# Patient Record
Sex: MALE | Race: BLACK OR AFRICAN AMERICAN | NOT HISPANIC OR LATINO | URBAN - METROPOLITAN AREA
[De-identification: names, ages, dates, MRNs, and addresses within clinical notes are randomized per-mention and may not be internally consistent; named-entity substitution may affect disease eponyms.]

---

## 2017-01-05 ENCOUNTER — OUTPATIENT (OUTPATIENT)
Dept: OUTPATIENT SERVICES | Facility: HOSPITAL | Age: 62
LOS: 1 days | End: 2017-01-05
Payer: MEDICAID

## 2017-01-05 VITALS
HEIGHT: 70 IN | TEMPERATURE: 98 F | RESPIRATION RATE: 16 BRPM | WEIGHT: 227.96 LBS | SYSTOLIC BLOOD PRESSURE: 122 MMHG | DIASTOLIC BLOOD PRESSURE: 78 MMHG | HEART RATE: 76 BPM

## 2017-01-05 DIAGNOSIS — G47.33 OBSTRUCTIVE SLEEP APNEA (ADULT) (PEDIATRIC): ICD-10-CM

## 2017-01-05 DIAGNOSIS — Z91.013 ALLERGY TO SEAFOOD: ICD-10-CM

## 2017-01-05 DIAGNOSIS — M51.26 OTHER INTERVERTEBRAL DISC DISPLACEMENT, LUMBAR REGION: ICD-10-CM

## 2017-01-05 DIAGNOSIS — M48.07 SPINAL STENOSIS, LUMBOSACRAL REGION: ICD-10-CM

## 2017-01-05 DIAGNOSIS — Z98.89 OTHER SPECIFIED POSTPROCEDURAL STATES: Chronic | ICD-10-CM

## 2017-01-05 LAB
BLD GP AB SCN SERPL QL: NEGATIVE — SIGNIFICANT CHANGE UP
BUN SERPL-MCNC: 10 MG/DL — SIGNIFICANT CHANGE UP (ref 7–23)
CALCIUM SERPL-MCNC: 9.6 MG/DL — SIGNIFICANT CHANGE UP (ref 8.4–10.5)
CHLORIDE SERPL-SCNC: 100 MMOL/L — SIGNIFICANT CHANGE UP (ref 98–107)
CO2 SERPL-SCNC: 27 MMOL/L — SIGNIFICANT CHANGE UP (ref 22–31)
CREAT SERPL-MCNC: 1.07 MG/DL — SIGNIFICANT CHANGE UP (ref 0.5–1.3)
GLUCOSE SERPL-MCNC: 98 MG/DL — SIGNIFICANT CHANGE UP (ref 70–99)
HCT VFR BLD CALC: 44.6 % — SIGNIFICANT CHANGE UP (ref 39–50)
HGB BLD-MCNC: 14.2 G/DL — SIGNIFICANT CHANGE UP (ref 13–17)
MCHC RBC-ENTMCNC: 26 PG — LOW (ref 27–34)
MCHC RBC-ENTMCNC: 31.8 % — LOW (ref 32–36)
MCV RBC AUTO: 81.5 FL — SIGNIFICANT CHANGE UP (ref 80–100)
PLATELET # BLD AUTO: 254 K/UL — SIGNIFICANT CHANGE UP (ref 150–400)
PMV BLD: 11 FL — SIGNIFICANT CHANGE UP (ref 7–13)
POTASSIUM SERPL-MCNC: 4.2 MMOL/L — SIGNIFICANT CHANGE UP (ref 3.5–5.3)
POTASSIUM SERPL-SCNC: 4.2 MMOL/L — SIGNIFICANT CHANGE UP (ref 3.5–5.3)
RBC # BLD: 5.47 M/UL — SIGNIFICANT CHANGE UP (ref 4.2–5.8)
RBC # FLD: 13.6 % — SIGNIFICANT CHANGE UP (ref 10.3–14.5)
RH IG SCN BLD-IMP: POSITIVE — SIGNIFICANT CHANGE UP
SODIUM SERPL-SCNC: 142 MMOL/L — SIGNIFICANT CHANGE UP (ref 135–145)
WBC # BLD: 6.03 K/UL — SIGNIFICANT CHANGE UP (ref 3.8–10.5)
WBC # FLD AUTO: 6.03 K/UL — SIGNIFICANT CHANGE UP (ref 3.8–10.5)

## 2017-01-05 PROCEDURE — 93010 ELECTROCARDIOGRAM REPORT: CPT

## 2017-01-05 RX ORDER — SODIUM CHLORIDE 9 MG/ML
1000 INJECTION, SOLUTION INTRAVENOUS
Qty: 0 | Refills: 0 | Status: DISCONTINUED | OUTPATIENT
Start: 2017-01-17 | End: 2017-01-17

## 2017-01-05 RX ORDER — SODIUM CHLORIDE 9 MG/ML
3 INJECTION INTRAMUSCULAR; INTRAVENOUS; SUBCUTANEOUS EVERY 8 HOURS
Qty: 0 | Refills: 0 | Status: DISCONTINUED | OUTPATIENT
Start: 2017-01-17 | End: 2017-01-20

## 2017-01-05 NOTE — H&P PST ADULT - NEGATIVE CARDIOVASCULAR SYMPTOMS
no paroxysmal nocturnal dyspnea/no dyspnea on exertion/no chest pain/no palpitations/no orthopnea/no claudication

## 2017-01-05 NOTE — H&P PST ADULT - NEUROLOGICAL DETAILS
responds to verbal commands/alert and oriented x 3/responds to pain/sensation intact/cranial nerves intact

## 2017-01-05 NOTE — H&P PST ADULT - PROBLEM SELECTOR PLAN 1
Scheduled  for L4-S1 laminectomy and discectomy on 01/17/17. Pre op instructions, famotidine, chlorhexidine gluconate soap given and explained. Pt verbalized understanding.

## 2017-01-05 NOTE — H&P PST ADULT - HISTORY OF PRESENT ILLNESS
62 y/o Black male with PMH: HTN, HLD, Obesity presents to PST for  pre op evaluation with hx of chronic lower back pain. Pt stated that pain has been worsening for the past 4-5 months. S/P MRI 10/19/2016. Now scheduled for L4-S1 lumbar laminectomy and fusion on 01/17/17

## 2017-01-05 NOTE — H&P PST ADULT - NEGATIVE OPHTHALMOLOGIC SYMPTOMS
no irritation R/no pain L/no discharge L/no pain R/no scleral injection R/no irritation L/no photophobia/no blurred vision L/no lacrimation L/no scleral injection L/no diplopia/no loss of vision R/no loss of vision L/no blurred vision R/no discharge R/no lacrimation R

## 2017-01-05 NOTE — H&P PST ADULT - NEGATIVE SKIN SYMPTOMS
no rash/no pitted nails/no itching/no dryness/no brittle nails/no tumor/no change in size/color of mole

## 2017-01-05 NOTE — H&P PST ADULT - FAMILY HISTORY
Father  Still living? Yes, Estimated age: 81-90  Family history of Alzheimer's disease, Age at diagnosis: Age Unknown     Mother  Still living? Yes, Estimated age: 81-90  Family history of hypertension in mother, Age at diagnosis: Age Unknown

## 2017-01-05 NOTE — H&P PST ADULT - NEGATIVE BREAST SYMPTOMS
no nipple discharge L/no breast lump L/no breast tenderness R/no breast tenderness L/no nipple discharge R/no breast lump R

## 2017-01-05 NOTE — H&P PST ADULT - PMH
Back pain  chronic  HTN (hypertension)    Obesity (BMI 30.0-34.9)    CHRISSIE on CPAP    Other intervertebral disc displacement, lumbar region    Spinal stenosis, lumbosacral region

## 2017-01-05 NOTE — H&P PST ADULT - PRO PAIN LIFE ADAPT
inability to enjoy life/inability to sleep/decreased activity level/inability to work/inability to concentrate/withdrawal from social contact

## 2017-01-13 ENCOUNTER — APPOINTMENT (OUTPATIENT)
Dept: ORTHOPEDIC SURGERY | Facility: CLINIC | Age: 62
End: 2017-01-13

## 2017-01-13 VITALS — HEIGHT: 71 IN | WEIGHT: 234 LBS | BODY MASS INDEX: 32.76 KG/M2

## 2017-01-13 RX ORDER — PSYLLIUM HUSK 3.4G/5.8G
58.6 POWDER (GRAM) ORAL
Qty: 283 | Refills: 0 | Status: ACTIVE | COMMUNITY
Start: 2016-12-27

## 2017-01-13 RX ORDER — FENOFIBRATE 134 MG/1
134 CAPSULE ORAL
Qty: 30 | Refills: 0 | Status: ACTIVE | COMMUNITY
Start: 2016-05-09

## 2017-01-13 RX ORDER — GABAPENTIN 600 MG/1
600 TABLET, COATED ORAL
Qty: 90 | Refills: 0 | Status: ACTIVE | COMMUNITY
Start: 2016-05-09

## 2017-01-16 ENCOUNTER — RESULT REVIEW (OUTPATIENT)
Age: 62
End: 2017-01-16

## 2017-01-17 ENCOUNTER — INPATIENT (INPATIENT)
Facility: HOSPITAL | Age: 62
LOS: 2 days | Discharge: NURSING, REHAB OR LONG-TERM CARE | End: 2017-01-20
Attending: STUDENT IN AN ORGANIZED HEALTH CARE EDUCATION/TRAINING PROGRAM | Admitting: STUDENT IN AN ORGANIZED HEALTH CARE EDUCATION/TRAINING PROGRAM
Payer: MEDICAID

## 2017-01-17 ENCOUNTER — APPOINTMENT (OUTPATIENT)
Dept: ORTHOPEDIC SURGERY | Facility: HOSPITAL | Age: 62
End: 2017-01-17

## 2017-01-17 VITALS
RESPIRATION RATE: 16 BRPM | SYSTOLIC BLOOD PRESSURE: 137 MMHG | WEIGHT: 227.96 LBS | TEMPERATURE: 98 F | HEIGHT: 70 IN | HEART RATE: 75 BPM | OXYGEN SATURATION: 99 % | DIASTOLIC BLOOD PRESSURE: 88 MMHG

## 2017-01-17 DIAGNOSIS — Z98.89 OTHER SPECIFIED POSTPROCEDURAL STATES: Chronic | ICD-10-CM

## 2017-01-17 DIAGNOSIS — M51.26 OTHER INTERVERTEBRAL DISC DISPLACEMENT, LUMBAR REGION: ICD-10-CM

## 2017-01-17 LAB
BASOPHILS # BLD AUTO: 0.02 K/UL — SIGNIFICANT CHANGE UP (ref 0–0.2)
BASOPHILS NFR BLD AUTO: 0.3 % — SIGNIFICANT CHANGE UP (ref 0–2)
BUN SERPL-MCNC: 12 MG/DL — SIGNIFICANT CHANGE UP (ref 7–23)
CALCIUM SERPL-MCNC: 8.5 MG/DL — SIGNIFICANT CHANGE UP (ref 8.4–10.5)
CHLORIDE SERPL-SCNC: 101 MMOL/L — SIGNIFICANT CHANGE UP (ref 98–107)
CO2 SERPL-SCNC: 24 MMOL/L — SIGNIFICANT CHANGE UP (ref 22–31)
CREAT SERPL-MCNC: 1.27 MG/DL — SIGNIFICANT CHANGE UP (ref 0.5–1.3)
EOSINOPHIL # BLD AUTO: 0.11 K/UL — SIGNIFICANT CHANGE UP (ref 0–0.5)
EOSINOPHIL NFR BLD AUTO: 1.6 % — SIGNIFICANT CHANGE UP (ref 0–6)
GLUCOSE SERPL-MCNC: 139 MG/DL — HIGH (ref 70–99)
HCT VFR BLD CALC: 41 % — SIGNIFICANT CHANGE UP (ref 39–50)
HGB BLD-MCNC: 13.3 G/DL — SIGNIFICANT CHANGE UP (ref 13–17)
IMM GRANULOCYTES NFR BLD AUTO: 0.6 % — SIGNIFICANT CHANGE UP (ref 0–1.5)
LYMPHOCYTES # BLD AUTO: 1.45 K/UL — SIGNIFICANT CHANGE UP (ref 1–3.3)
LYMPHOCYTES # BLD AUTO: 21.6 % — SIGNIFICANT CHANGE UP (ref 13–44)
MCHC RBC-ENTMCNC: 26.5 PG — LOW (ref 27–34)
MCHC RBC-ENTMCNC: 32.4 % — SIGNIFICANT CHANGE UP (ref 32–36)
MCV RBC AUTO: 81.8 FL — SIGNIFICANT CHANGE UP (ref 80–100)
MONOCYTES # BLD AUTO: 0.23 K/UL — SIGNIFICANT CHANGE UP (ref 0–0.9)
MONOCYTES NFR BLD AUTO: 3.4 % — SIGNIFICANT CHANGE UP (ref 2–14)
NEUTROPHILS # BLD AUTO: 4.86 K/UL — SIGNIFICANT CHANGE UP (ref 1.8–7.4)
NEUTROPHILS NFR BLD AUTO: 72.5 % — SIGNIFICANT CHANGE UP (ref 43–77)
PLATELET # BLD AUTO: 217 K/UL — SIGNIFICANT CHANGE UP (ref 150–400)
PMV BLD: 10.3 FL — SIGNIFICANT CHANGE UP (ref 7–13)
POTASSIUM SERPL-MCNC: 4.3 MMOL/L — SIGNIFICANT CHANGE UP (ref 3.5–5.3)
POTASSIUM SERPL-SCNC: 4.3 MMOL/L — SIGNIFICANT CHANGE UP (ref 3.5–5.3)
RBC # BLD: 5.01 M/UL — SIGNIFICANT CHANGE UP (ref 4.2–5.8)
RBC # FLD: 14.2 % — SIGNIFICANT CHANGE UP (ref 10.3–14.5)
RH IG SCN BLD-IMP: POSITIVE — SIGNIFICANT CHANGE UP
SODIUM SERPL-SCNC: 140 MMOL/L — SIGNIFICANT CHANGE UP (ref 135–145)
WBC # BLD: 6.71 K/UL — SIGNIFICANT CHANGE UP (ref 3.8–10.5)
WBC # FLD AUTO: 6.71 K/UL — SIGNIFICANT CHANGE UP (ref 3.8–10.5)

## 2017-01-17 PROCEDURE — 72100 X-RAY EXAM L-S SPINE 2/3 VWS: CPT | Mod: 26

## 2017-01-17 PROCEDURE — 63012 REMOVE LAMINA/FACETS LUMBAR: CPT | Mod: 59

## 2017-01-17 PROCEDURE — 63047 LAM FACETEC & FORAMOT LUMBAR: CPT

## 2017-01-17 PROCEDURE — 63012 REMOVE LAMINA/FACETS LUMBAR: CPT | Mod: 82,59

## 2017-01-17 PROCEDURE — 63047 LAM FACETEC & FORAMOT LUMBAR: CPT | Mod: 82

## 2017-01-17 PROCEDURE — 88304 TISSUE EXAM BY PATHOLOGIST: CPT | Mod: 26

## 2017-01-17 PROCEDURE — 63030 LAMOT DCMPRN NRV RT 1 LMBR: CPT | Mod: 82,59

## 2017-01-17 PROCEDURE — 63030 LAMOT DCMPRN NRV RT 1 LMBR: CPT | Mod: 59

## 2017-01-17 RX ORDER — SENNA PLUS 8.6 MG/1
2 TABLET ORAL AT BEDTIME
Qty: 0 | Refills: 0 | Status: DISCONTINUED | OUTPATIENT
Start: 2017-01-17 | End: 2017-01-20

## 2017-01-17 RX ORDER — ACETAMINOPHEN 500 MG
650 TABLET ORAL EVERY 6 HOURS
Qty: 0 | Refills: 0 | Status: DISCONTINUED | OUTPATIENT
Start: 2017-01-17 | End: 2017-01-20

## 2017-01-17 RX ORDER — OXYCODONE HYDROCHLORIDE 5 MG/1
5 TABLET ORAL EVERY 4 HOURS
Qty: 0 | Refills: 0 | Status: DISCONTINUED | OUTPATIENT
Start: 2017-01-17 | End: 2017-01-20

## 2017-01-17 RX ORDER — ONDANSETRON 8 MG/1
4 TABLET, FILM COATED ORAL ONCE
Qty: 0 | Refills: 0 | Status: DISCONTINUED | OUTPATIENT
Start: 2017-01-17 | End: 2017-01-17

## 2017-01-17 RX ORDER — MAGNESIUM HYDROXIDE 400 MG/1
30 TABLET, CHEWABLE ORAL EVERY 12 HOURS
Qty: 0 | Refills: 0 | Status: DISCONTINUED | OUTPATIENT
Start: 2017-01-17 | End: 2017-01-20

## 2017-01-17 RX ORDER — GABAPENTIN 400 MG/1
300 CAPSULE ORAL
Qty: 0 | Refills: 0 | Status: DISCONTINUED | OUTPATIENT
Start: 2017-01-17 | End: 2017-01-20

## 2017-01-17 RX ORDER — ATENOLOL 25 MG/1
100 TABLET ORAL DAILY
Qty: 0 | Refills: 0 | Status: DISCONTINUED | OUTPATIENT
Start: 2017-01-17 | End: 2017-01-20

## 2017-01-17 RX ORDER — OXYCODONE HYDROCHLORIDE 5 MG/1
5 TABLET ORAL EVERY 4 HOURS
Qty: 0 | Refills: 0 | Status: DISCONTINUED | OUTPATIENT
Start: 2017-01-17 | End: 2017-01-17

## 2017-01-17 RX ORDER — AMLODIPINE BESYLATE 2.5 MG/1
10 TABLET ORAL DAILY
Qty: 0 | Refills: 0 | Status: DISCONTINUED | OUTPATIENT
Start: 2017-01-17 | End: 2017-01-20

## 2017-01-17 RX ORDER — TRAMADOL HYDROCHLORIDE 50 MG/1
100 TABLET ORAL
Qty: 0 | Refills: 0 | Status: DISCONTINUED | OUTPATIENT
Start: 2017-01-17 | End: 2017-01-20

## 2017-01-17 RX ORDER — ONDANSETRON 8 MG/1
4 TABLET, FILM COATED ORAL EVERY 6 HOURS
Qty: 0 | Refills: 0 | Status: DISCONTINUED | OUTPATIENT
Start: 2017-01-17 | End: 2017-01-20

## 2017-01-17 RX ORDER — TRIAMTERENE/HYDROCHLOROTHIAZID 75 MG-50MG
1 TABLET ORAL DAILY
Qty: 0 | Refills: 0 | Status: DISCONTINUED | OUTPATIENT
Start: 2017-01-17 | End: 2017-01-20

## 2017-01-17 RX ORDER — OXYCODONE HYDROCHLORIDE 5 MG/1
10 TABLET ORAL EVERY 4 HOURS
Qty: 0 | Refills: 0 | Status: DISCONTINUED | OUTPATIENT
Start: 2017-01-17 | End: 2017-01-20

## 2017-01-17 RX ORDER — HYDROMORPHONE HYDROCHLORIDE 2 MG/ML
0.5 INJECTION INTRAMUSCULAR; INTRAVENOUS; SUBCUTANEOUS
Qty: 0 | Refills: 0 | Status: DISCONTINUED | OUTPATIENT
Start: 2017-01-17 | End: 2017-01-17

## 2017-01-17 RX ORDER — DOCUSATE SODIUM 100 MG
100 CAPSULE ORAL THREE TIMES A DAY
Qty: 0 | Refills: 0 | Status: DISCONTINUED | OUTPATIENT
Start: 2017-01-17 | End: 2017-01-20

## 2017-01-17 RX ORDER — SODIUM CHLORIDE 9 MG/ML
1000 INJECTION, SOLUTION INTRAVENOUS
Qty: 0 | Refills: 0 | Status: DISCONTINUED | OUTPATIENT
Start: 2017-01-17 | End: 2017-01-20

## 2017-01-17 RX ORDER — CEFAZOLIN SODIUM 1 G
2000 VIAL (EA) INJECTION EVERY 8 HOURS
Qty: 0 | Refills: 0 | Status: COMPLETED | OUTPATIENT
Start: 2017-01-17 | End: 2017-01-18

## 2017-01-17 RX ORDER — MORPHINE SULFATE 50 MG/1
4 CAPSULE, EXTENDED RELEASE ORAL EVERY 4 HOURS
Qty: 0 | Refills: 0 | Status: DISCONTINUED | OUTPATIENT
Start: 2017-01-17 | End: 2017-01-20

## 2017-01-17 RX ADMIN — SODIUM CHLORIDE 3 MILLILITER(S): 9 INJECTION INTRAMUSCULAR; INTRAVENOUS; SUBCUTANEOUS at 14:01

## 2017-01-17 RX ADMIN — HYDROMORPHONE HYDROCHLORIDE 0.5 MILLIGRAM(S): 2 INJECTION INTRAMUSCULAR; INTRAVENOUS; SUBCUTANEOUS at 13:55

## 2017-01-17 RX ADMIN — HYDROMORPHONE HYDROCHLORIDE 0.5 MILLIGRAM(S): 2 INJECTION INTRAMUSCULAR; INTRAVENOUS; SUBCUTANEOUS at 13:45

## 2017-01-17 RX ADMIN — SODIUM CHLORIDE 3 MILLILITER(S): 9 INJECTION INTRAMUSCULAR; INTRAVENOUS; SUBCUTANEOUS at 21:07

## 2017-01-17 RX ADMIN — SODIUM CHLORIDE 75 MILLILITER(S): 9 INJECTION, SOLUTION INTRAVENOUS at 17:21

## 2017-01-17 RX ADMIN — Medication 100 MILLIGRAM(S): at 17:20

## 2017-01-17 RX ADMIN — Medication 100 MILLIGRAM(S): at 21:12

## 2017-01-17 RX ADMIN — HYDROMORPHONE HYDROCHLORIDE 0.5 MILLIGRAM(S): 2 INJECTION INTRAMUSCULAR; INTRAVENOUS; SUBCUTANEOUS at 14:10

## 2017-01-17 RX ADMIN — TRAMADOL HYDROCHLORIDE 100 MILLIGRAM(S): 50 TABLET ORAL at 17:20

## 2017-01-17 RX ADMIN — MORPHINE SULFATE 4 MILLIGRAM(S): 50 CAPSULE, EXTENDED RELEASE ORAL at 20:03

## 2017-01-17 RX ADMIN — GABAPENTIN 300 MILLIGRAM(S): 400 CAPSULE ORAL at 17:20

## 2017-01-17 RX ADMIN — HYDROMORPHONE HYDROCHLORIDE 0.5 MILLIGRAM(S): 2 INJECTION INTRAMUSCULAR; INTRAVENOUS; SUBCUTANEOUS at 13:30

## 2017-01-17 RX ADMIN — OXYCODONE HYDROCHLORIDE 10 MILLIGRAM(S): 5 TABLET ORAL at 17:58

## 2017-01-17 RX ADMIN — OXYCODONE HYDROCHLORIDE 10 MILLIGRAM(S): 5 TABLET ORAL at 16:50

## 2017-01-17 RX ADMIN — MORPHINE SULFATE 4 MILLIGRAM(S): 50 CAPSULE, EXTENDED RELEASE ORAL at 20:30

## 2017-01-17 RX ADMIN — OXYCODONE HYDROCHLORIDE 10 MILLIGRAM(S): 5 TABLET ORAL at 23:58

## 2017-01-17 RX ADMIN — SENNA PLUS 2 TABLET(S): 8.6 TABLET ORAL at 21:12

## 2017-01-17 RX ADMIN — HYDROMORPHONE HYDROCHLORIDE 0.5 MILLIGRAM(S): 2 INJECTION INTRAMUSCULAR; INTRAVENOUS; SUBCUTANEOUS at 13:42

## 2017-01-17 NOTE — BRIEF OPERATIVE NOTE - OPERATION/FINDINGS
See dictated report. L4-5, L5-S1 disc herniations. L side L5 pars defect.  L4-S1 lami, L4-5, L5-S1 discectomy

## 2017-01-18 LAB
BASOPHILS # BLD AUTO: 0.01 K/UL — SIGNIFICANT CHANGE UP (ref 0–0.2)
BASOPHILS NFR BLD AUTO: 0.1 % — SIGNIFICANT CHANGE UP (ref 0–2)
BUN SERPL-MCNC: 13 MG/DL — SIGNIFICANT CHANGE UP (ref 7–23)
CALCIUM SERPL-MCNC: 8.9 MG/DL — SIGNIFICANT CHANGE UP (ref 8.4–10.5)
CHLORIDE SERPL-SCNC: 98 MMOL/L — SIGNIFICANT CHANGE UP (ref 98–107)
CO2 SERPL-SCNC: 24 MMOL/L — SIGNIFICANT CHANGE UP (ref 22–31)
CREAT SERPL-MCNC: 1.12 MG/DL — SIGNIFICANT CHANGE UP (ref 0.5–1.3)
EOSINOPHIL # BLD AUTO: 0 K/UL — SIGNIFICANT CHANGE UP (ref 0–0.5)
EOSINOPHIL NFR BLD AUTO: 0 % — SIGNIFICANT CHANGE UP (ref 0–6)
GLUCOSE SERPL-MCNC: 139 MG/DL — HIGH (ref 70–99)
HCT VFR BLD CALC: 39.5 % — SIGNIFICANT CHANGE UP (ref 39–50)
HGB BLD-MCNC: 12.9 G/DL — LOW (ref 13–17)
IMM GRANULOCYTES NFR BLD AUTO: 0.3 % — SIGNIFICANT CHANGE UP (ref 0–1.5)
LYMPHOCYTES # BLD AUTO: 1.18 K/UL — SIGNIFICANT CHANGE UP (ref 1–3.3)
LYMPHOCYTES # BLD AUTO: 7.9 % — LOW (ref 13–44)
MCHC RBC-ENTMCNC: 26.7 PG — LOW (ref 27–34)
MCHC RBC-ENTMCNC: 32.7 % — SIGNIFICANT CHANGE UP (ref 32–36)
MCV RBC AUTO: 81.8 FL — SIGNIFICANT CHANGE UP (ref 80–100)
MONOCYTES # BLD AUTO: 1.13 K/UL — HIGH (ref 0–0.9)
MONOCYTES NFR BLD AUTO: 7.6 % — SIGNIFICANT CHANGE UP (ref 2–14)
NEUTROPHILS # BLD AUTO: 12.5 K/UL — HIGH (ref 1.8–7.4)
NEUTROPHILS NFR BLD AUTO: 84.1 % — HIGH (ref 43–77)
PLATELET # BLD AUTO: 231 K/UL — SIGNIFICANT CHANGE UP (ref 150–400)
PMV BLD: 10.7 FL — SIGNIFICANT CHANGE UP (ref 7–13)
POTASSIUM SERPL-MCNC: 3.9 MMOL/L — SIGNIFICANT CHANGE UP (ref 3.5–5.3)
POTASSIUM SERPL-SCNC: 3.9 MMOL/L — SIGNIFICANT CHANGE UP (ref 3.5–5.3)
RBC # BLD: 4.83 M/UL — SIGNIFICANT CHANGE UP (ref 4.2–5.8)
RBC # FLD: 14.4 % — SIGNIFICANT CHANGE UP (ref 10.3–14.5)
SODIUM SERPL-SCNC: 138 MMOL/L — SIGNIFICANT CHANGE UP (ref 135–145)
WBC # BLD: 14.86 K/UL — HIGH (ref 3.8–10.5)
WBC # FLD AUTO: 14.86 K/UL — HIGH (ref 3.8–10.5)

## 2017-01-18 RX ORDER — BENZOCAINE AND MENTHOL 5; 1 G/100ML; G/100ML
1 LIQUID ORAL
Qty: 0 | Refills: 0 | Status: DISCONTINUED | OUTPATIENT
Start: 2017-01-18 | End: 2017-01-20

## 2017-01-18 RX ADMIN — Medication 100 MILLIGRAM(S): at 02:16

## 2017-01-18 RX ADMIN — TRAMADOL HYDROCHLORIDE 100 MILLIGRAM(S): 50 TABLET ORAL at 05:20

## 2017-01-18 RX ADMIN — SENNA PLUS 2 TABLET(S): 8.6 TABLET ORAL at 21:34

## 2017-01-18 RX ADMIN — SODIUM CHLORIDE 75 MILLILITER(S): 9 INJECTION, SOLUTION INTRAVENOUS at 06:53

## 2017-01-18 RX ADMIN — SODIUM CHLORIDE 3 MILLILITER(S): 9 INJECTION INTRAMUSCULAR; INTRAVENOUS; SUBCUTANEOUS at 13:50

## 2017-01-18 RX ADMIN — GABAPENTIN 300 MILLIGRAM(S): 400 CAPSULE ORAL at 17:42

## 2017-01-18 RX ADMIN — Medication 100 MILLIGRAM(S): at 14:29

## 2017-01-18 RX ADMIN — TRAMADOL HYDROCHLORIDE 100 MILLIGRAM(S): 50 TABLET ORAL at 17:41

## 2017-01-18 RX ADMIN — MORPHINE SULFATE 4 MILLIGRAM(S): 50 CAPSULE, EXTENDED RELEASE ORAL at 23:09

## 2017-01-18 RX ADMIN — MORPHINE SULFATE 4 MILLIGRAM(S): 50 CAPSULE, EXTENDED RELEASE ORAL at 22:44

## 2017-01-18 RX ADMIN — OXYCODONE HYDROCHLORIDE 10 MILLIGRAM(S): 5 TABLET ORAL at 14:55

## 2017-01-18 RX ADMIN — OXYCODONE HYDROCHLORIDE 10 MILLIGRAM(S): 5 TABLET ORAL at 06:10

## 2017-01-18 RX ADMIN — MAGNESIUM HYDROXIDE 30 MILLILITER(S): 400 TABLET, CHEWABLE ORAL at 14:29

## 2017-01-18 RX ADMIN — OXYCODONE HYDROCHLORIDE 10 MILLIGRAM(S): 5 TABLET ORAL at 22:26

## 2017-01-18 RX ADMIN — OXYCODONE HYDROCHLORIDE 10 MILLIGRAM(S): 5 TABLET ORAL at 21:26

## 2017-01-18 RX ADMIN — SODIUM CHLORIDE 3 MILLILITER(S): 9 INJECTION INTRAMUSCULAR; INTRAVENOUS; SUBCUTANEOUS at 05:21

## 2017-01-18 RX ADMIN — MORPHINE SULFATE 4 MILLIGRAM(S): 50 CAPSULE, EXTENDED RELEASE ORAL at 07:08

## 2017-01-18 RX ADMIN — OXYCODONE HYDROCHLORIDE 10 MILLIGRAM(S): 5 TABLET ORAL at 11:06

## 2017-01-18 RX ADMIN — Medication 100 MILLIGRAM(S): at 05:20

## 2017-01-18 RX ADMIN — AMLODIPINE BESYLATE 10 MILLIGRAM(S): 2.5 TABLET ORAL at 05:21

## 2017-01-18 RX ADMIN — ATENOLOL 100 MILLIGRAM(S): 25 TABLET ORAL at 06:35

## 2017-01-18 RX ADMIN — OXYCODONE HYDROCHLORIDE 10 MILLIGRAM(S): 5 TABLET ORAL at 14:29

## 2017-01-18 RX ADMIN — OXYCODONE HYDROCHLORIDE 10 MILLIGRAM(S): 5 TABLET ORAL at 00:55

## 2017-01-18 RX ADMIN — OXYCODONE HYDROCHLORIDE 10 MILLIGRAM(S): 5 TABLET ORAL at 05:10

## 2017-01-18 RX ADMIN — BENZOCAINE AND MENTHOL 1 LOZENGE: 5; 1 LIQUID ORAL at 06:54

## 2017-01-18 RX ADMIN — GABAPENTIN 300 MILLIGRAM(S): 400 CAPSULE ORAL at 05:20

## 2017-01-18 RX ADMIN — Medication 1 CAPSULE(S): at 05:20

## 2017-01-18 RX ADMIN — OXYCODONE HYDROCHLORIDE 10 MILLIGRAM(S): 5 TABLET ORAL at 10:01

## 2017-01-18 RX ADMIN — MORPHINE SULFATE 4 MILLIGRAM(S): 50 CAPSULE, EXTENDED RELEASE ORAL at 07:23

## 2017-01-18 RX ADMIN — Medication 100 MILLIGRAM(S): at 21:34

## 2017-01-18 NOTE — OCCUPATIONAL THERAPY INITIAL EVALUATION ADULT - PERTINENT HX OF CURRENT PROBLEM, REHAB EVAL
60 y/o male with PMH: HTN, HLD, Obesity with hx of chronic lower back pain. Pt stated that pain has been worsening for the past 4-5 months. S/P MRI 10/19/2016. Now s/p L4-S1 lumbar laminectomy and fusion.

## 2017-01-18 NOTE — OCCUPATIONAL THERAPY INITIAL EVALUATION ADULT - MD ORDER
Occupational Therapy to evaluate and treat. Occupational Therapy to evaluate and treat.  OOB to chair. Ambulate as tolerated.

## 2017-01-19 ENCOUNTER — TRANSCRIPTION ENCOUNTER (OUTPATIENT)
Age: 62
End: 2017-01-19

## 2017-01-19 LAB
BASOPHILS # BLD AUTO: 0.02 K/UL — SIGNIFICANT CHANGE UP (ref 0–0.2)
BASOPHILS NFR BLD AUTO: 0.1 % — SIGNIFICANT CHANGE UP (ref 0–2)
BUN SERPL-MCNC: 19 MG/DL — SIGNIFICANT CHANGE UP (ref 7–23)
CALCIUM SERPL-MCNC: 9.3 MG/DL — SIGNIFICANT CHANGE UP (ref 8.4–10.5)
CHLORIDE SERPL-SCNC: 92 MMOL/L — LOW (ref 98–107)
CO2 SERPL-SCNC: 27 MMOL/L — SIGNIFICANT CHANGE UP (ref 22–31)
CREAT SERPL-MCNC: 1.25 MG/DL — SIGNIFICANT CHANGE UP (ref 0.5–1.3)
EOSINOPHIL # BLD AUTO: 0.01 K/UL — SIGNIFICANT CHANGE UP (ref 0–0.5)
EOSINOPHIL NFR BLD AUTO: 0.1 % — SIGNIFICANT CHANGE UP (ref 0–6)
GLUCOSE SERPL-MCNC: 108 MG/DL — HIGH (ref 70–99)
HCT VFR BLD CALC: 42.8 % — SIGNIFICANT CHANGE UP (ref 39–50)
HGB BLD-MCNC: 13.7 G/DL — SIGNIFICANT CHANGE UP (ref 13–17)
IMM GRANULOCYTES NFR BLD AUTO: 0.3 % — SIGNIFICANT CHANGE UP (ref 0–1.5)
LYMPHOCYTES # BLD AUTO: 1.71 K/UL — SIGNIFICANT CHANGE UP (ref 1–3.3)
LYMPHOCYTES # BLD AUTO: 11.9 % — LOW (ref 13–44)
MCHC RBC-ENTMCNC: 26.4 PG — LOW (ref 27–34)
MCHC RBC-ENTMCNC: 32 % — SIGNIFICANT CHANGE UP (ref 32–36)
MCV RBC AUTO: 82.5 FL — SIGNIFICANT CHANGE UP (ref 80–100)
MONOCYTES # BLD AUTO: 1.39 K/UL — HIGH (ref 0–0.9)
MONOCYTES NFR BLD AUTO: 9.7 % — SIGNIFICANT CHANGE UP (ref 2–14)
NEUTROPHILS # BLD AUTO: 11.19 K/UL — HIGH (ref 1.8–7.4)
NEUTROPHILS NFR BLD AUTO: 77.9 % — HIGH (ref 43–77)
PLATELET # BLD AUTO: 245 K/UL — SIGNIFICANT CHANGE UP (ref 150–400)
PMV BLD: 10.8 FL — SIGNIFICANT CHANGE UP (ref 7–13)
POTASSIUM SERPL-MCNC: 3.8 MMOL/L — SIGNIFICANT CHANGE UP (ref 3.5–5.3)
POTASSIUM SERPL-SCNC: 3.8 MMOL/L — SIGNIFICANT CHANGE UP (ref 3.5–5.3)
RBC # BLD: 5.19 M/UL — SIGNIFICANT CHANGE UP (ref 4.2–5.8)
RBC # FLD: 14.7 % — HIGH (ref 10.3–14.5)
SODIUM SERPL-SCNC: 135 MMOL/L — SIGNIFICANT CHANGE UP (ref 135–145)
SURGICAL PATHOLOGY STUDY: SIGNIFICANT CHANGE UP
WBC # BLD: 14.37 K/UL — HIGH (ref 3.8–10.5)
WBC # FLD AUTO: 14.37 K/UL — HIGH (ref 3.8–10.5)

## 2017-01-19 RX ORDER — TRAMADOL HYDROCHLORIDE 50 MG/1
2 TABLET ORAL
Qty: 0 | Refills: 0 | COMMUNITY
Start: 2017-01-19

## 2017-01-19 RX ORDER — OXYCODONE HYDROCHLORIDE 5 MG/1
1 TABLET ORAL
Qty: 0 | Refills: 0 | COMMUNITY
Start: 2017-01-19

## 2017-01-19 RX ORDER — TRAMADOL HYDROCHLORIDE 50 MG/1
1 TABLET ORAL
Qty: 0 | Refills: 0 | COMMUNITY

## 2017-01-19 RX ORDER — SENNA PLUS 8.6 MG/1
2 TABLET ORAL
Qty: 0 | Refills: 0 | COMMUNITY
Start: 2017-01-19

## 2017-01-19 RX ADMIN — SENNA PLUS 2 TABLET(S): 8.6 TABLET ORAL at 21:42

## 2017-01-19 RX ADMIN — SODIUM CHLORIDE 3 MILLILITER(S): 9 INJECTION INTRAMUSCULAR; INTRAVENOUS; SUBCUTANEOUS at 21:43

## 2017-01-19 RX ADMIN — Medication 100 MILLIGRAM(S): at 14:52

## 2017-01-19 RX ADMIN — GABAPENTIN 300 MILLIGRAM(S): 400 CAPSULE ORAL at 05:18

## 2017-01-19 RX ADMIN — OXYCODONE HYDROCHLORIDE 10 MILLIGRAM(S): 5 TABLET ORAL at 02:19

## 2017-01-19 RX ADMIN — Medication 100 MILLIGRAM(S): at 05:18

## 2017-01-19 RX ADMIN — MAGNESIUM HYDROXIDE 30 MILLILITER(S): 400 TABLET, CHEWABLE ORAL at 18:48

## 2017-01-19 RX ADMIN — TRAMADOL HYDROCHLORIDE 100 MILLIGRAM(S): 50 TABLET ORAL at 05:18

## 2017-01-19 RX ADMIN — OXYCODONE HYDROCHLORIDE 10 MILLIGRAM(S): 5 TABLET ORAL at 20:33

## 2017-01-19 RX ADMIN — OXYCODONE HYDROCHLORIDE 10 MILLIGRAM(S): 5 TABLET ORAL at 09:35

## 2017-01-19 RX ADMIN — SODIUM CHLORIDE 3 MILLILITER(S): 9 INJECTION INTRAMUSCULAR; INTRAVENOUS; SUBCUTANEOUS at 14:51

## 2017-01-19 RX ADMIN — OXYCODONE HYDROCHLORIDE 10 MILLIGRAM(S): 5 TABLET ORAL at 10:05

## 2017-01-19 RX ADMIN — GABAPENTIN 300 MILLIGRAM(S): 400 CAPSULE ORAL at 18:47

## 2017-01-19 RX ADMIN — AMLODIPINE BESYLATE 10 MILLIGRAM(S): 2.5 TABLET ORAL at 05:17

## 2017-01-19 RX ADMIN — TRAMADOL HYDROCHLORIDE 100 MILLIGRAM(S): 50 TABLET ORAL at 18:47

## 2017-01-19 RX ADMIN — OXYCODONE HYDROCHLORIDE 10 MILLIGRAM(S): 5 TABLET ORAL at 20:03

## 2017-01-19 RX ADMIN — ATENOLOL 100 MILLIGRAM(S): 25 TABLET ORAL at 05:18

## 2017-01-19 RX ADMIN — Medication 650 MILLIGRAM(S): at 05:18

## 2017-01-19 RX ADMIN — OXYCODONE HYDROCHLORIDE 10 MILLIGRAM(S): 5 TABLET ORAL at 03:01

## 2017-01-19 RX ADMIN — Medication 100 MILLIGRAM(S): at 21:42

## 2017-01-19 RX ADMIN — SODIUM CHLORIDE 3 MILLILITER(S): 9 INJECTION INTRAMUSCULAR; INTRAVENOUS; SUBCUTANEOUS at 05:16

## 2017-01-19 RX ADMIN — Medication 1 CAPSULE(S): at 05:18

## 2017-01-19 NOTE — DISCHARGE NOTE ADULT - NS MD DC FALL RISK RISK
For information on Fall & Injury Prevention, visit [email or web address]

## 2017-01-19 NOTE — DISCHARGE NOTE ADULT - MEDICATION SUMMARY - MEDICATIONS TO TAKE
I will START or STAY ON the medications listed below when I get home from the hospital:    [employer] natural fiber  1 capful orally daily  --     -- Indication: For Home med     traMADol 50 mg oral tablet  -- 2 tab(s) by mouth 2 times a day  -- Indication: For Pain Med     oxyCODONE 10 mg oral tablet  -- 1 tab(s) by mouth every 4 hours, As needed, Severe Pain (7 - 10)  -- Indication: For Pain med     oxyCODONE 5 mg oral tablet  -- 1 tab(s) by mouth every 4 hours, As needed, Moderate Pain (4 - 6)  -- Indication: For Pain med     gabapentin 300 mg oral capsule  -- 1 cap(s) by mouth 2 times a day  -- Indication: For Pain med     fenofibrate 134 mg oral capsule  -- 1 cap(s) by mouth once a day in am  -- Indication: For HLD    triamterene-hydroCHLOROthiazide 37.5mg-25mg oral capsule  -- 1 cap(s) by mouth once a day in am  -- Indication: For HTN (hypertension)    atenolol 100 mg oral tablet  -- 1 tab(s) by mouth once a day in am  -- Indication: For HTN (hypertension)    amLODIPine 10 mg oral tablet  -- 1 tab(s) by mouth once a day in am  -- Indication: For HTN (hypertension)    docusate sodium 100 mg oral capsule  -- 1 cap(s) by mouth 2 times a day  -- Indication: For Stool softener     senna oral tablet  -- 2 tab(s) by mouth once a day (at bedtime)  -- Indication: For Stool softener

## 2017-01-19 NOTE — DISCHARGE NOTE ADULT - CARE PLAN
Principal Discharge DX:	Spinal stenosis, lumbosacral region  Goal:	Pain reduction, improve ambulation and ADLs  Instructions for follow-up, activity and diet:	Patient is a 62 yo male s/p L4-S1 Laminectomy / Discectomy with Dr Guevara on 1/17/16. Patient tolerated the procedure well without any intraoperative complications. Patient tolerated physical therapy well and pain is controlled. Seen by medical attending for continuity of care and management and cleared for safe discharge.  Please follow up with Dr Guevara in his office in 1-2 weeks. Call office to make an appointment. Please follow up with your PMD for continuity of care and management as medications may have changed. Please avoid any NSAIDS, Aspirin, Antiinflammatory medications unless instructed by your orthopaedic surgeon to continue. Avoid any heavy lifting, bending, squatting, twisting motion. Keep dressing/incision clean, dry, intact. Any suture/staples to be removed on post op day # 14 at office visit. Weight bear as tolerated. Notify ortho with any questions Principal Discharge DX:	Spinal stenosis, lumbosacral region  Goal:	Pain reduction, improve ambulation and ADLs  Instructions for follow-up, activity and diet:	Patient is a 60 yo male s/p L4-S1 Laminectomy / Discectomy with Dr Guevara on 1/17/16. Patient tolerated the procedure well without any intraoperative complications. Patient tolerated physical therapy well and pain is controlled. Seen by medical attending for continuity of care and management and cleared for safe discharge.  Please follow up with Dr Guevara in his office in 1-2 weeks. Call office to make an appointment. Please follow up with your PMD for continuity of care and management as medications may have changed. Please avoid any NSAIDS, Aspirin, Antiinflammatory medications unless instructed by your orthopaedic surgeon to continue. Avoid any heavy lifting, bending, squatting, twisting motion. Keep dressing/incision clean, dry, intact. Any suture/staples to be removed on post op day # 14 at office visit. Weight bear as tolerated. Notify ortho with any questions

## 2017-01-19 NOTE — DISCHARGE NOTE ADULT - CARE PROVIDERS DIRECT ADDRESSES
[email or web address]

## 2017-01-19 NOTE — DISCHARGE NOTE ADULT - PATIENT PORTAL LINK FT
“You can access the [facility] Patient Portal, offered by [facility], by registering with the following website: [email or web address]”

## 2017-01-19 NOTE — DISCHARGE NOTE ADULT - HOSPITAL COURSE
Patient is a 60 yo male s/p L4-S1 Laminectomy / Discectomy with Dr Guevara on 1/17/16. Patient tolerated the procedure well without any intraoperative complications. Patient tolerated physical therapy well and pain is controlled. Seen by medical attending for continuity of care and management and cleared for safe discharge.  Please follow up with Dr Guevara in his office in 1-2 weeks. Call office to make an appointment. Please follow up with your PMD for continuity of care and management as medications may have changed. Please avoid any NSAIDS, Aspirin, Antiinflammatory medications unless instructed by your orthopaedic surgeon to continue. Avoid any heavy lifting, bending, squatting, twisting motion. Keep dressing/incision clean, dry, intact. Any suture/staples to be removed on post op day # 14 at office visit. Weight bear as tolerated. Notify ortho with any questions

## 2017-01-19 NOTE — DISCHARGE NOTE ADULT - PLAN OF CARE
Pain reduction, improve ambulation and ADLs Patient is a 60 yo male s/p L4-S1 Laminectomy / Discectomy with Dr Guevara on 1/17/16. Patient tolerated the procedure well without any intraoperative complications. Patient tolerated physical therapy well and pain is controlled. Seen by medical attending for continuity of care and management and cleared for safe discharge.  Please follow up with Dr Guevara in his office in 1-2 weeks. Call office to make an appointment. Please follow up with your PMD for continuity of care and management as medications may have changed. Please avoid any NSAIDS, Aspirin, Antiinflammatory medications unless instructed by your orthopaedic surgeon to continue. Avoid any heavy lifting, bending, squatting, twisting motion. Keep dressing/incision clean, dry, intact. Any suture/staples to be removed on post op day # 14 at office visit. Weight bear as tolerated. Notify ortho with any questions

## 2017-01-19 NOTE — DISCHARGE NOTE ADULT - CONDITIONS AT DISCHARGE
Pt is afebrile and offers no complaints. In no acute distress. Back dressing: clean, dry and intact. Pt is ambulating with assistance, voiding in adequate amounts and tolerating diet well.

## 2017-01-19 NOTE — DISCHARGE NOTE ADULT - INSTRUCTIONS
may resume diet as prior to surgery Make a follow up appointment with Dr. Guevara. Call him if you develop a fever, or if there is redness, swelling, drainage or pain not relieved by medication. Do your exercises as instructed. No heavy lifting, bending, or straining to move your bowels. Take over the counter stool softeners as needed to prevent constipation which may be caused by pain medication.

## 2017-01-19 NOTE — DISCHARGE NOTE ADULT - MEDICATION SUMMARY - MEDICATIONS TO CHANGE
I will SWITCH the dose or number of times a day I take the medications listed below when I get home from the hospital:    gabapentin 300 mg oral capsule  -- 1 cap(s) by mouth 2 times a day

## 2017-01-20 VITALS
SYSTOLIC BLOOD PRESSURE: 130 MMHG | DIASTOLIC BLOOD PRESSURE: 77 MMHG | HEART RATE: 76 BPM | OXYGEN SATURATION: 97 % | RESPIRATION RATE: 18 BRPM | TEMPERATURE: 98 F

## 2017-01-20 LAB
BASOPHILS # BLD AUTO: 0.01 K/UL — SIGNIFICANT CHANGE UP (ref 0–0.2)
BASOPHILS NFR BLD AUTO: 0.1 % — SIGNIFICANT CHANGE UP (ref 0–2)
BUN SERPL-MCNC: 23 MG/DL — SIGNIFICANT CHANGE UP (ref 7–23)
CALCIUM SERPL-MCNC: 9.2 MG/DL — SIGNIFICANT CHANGE UP (ref 8.4–10.5)
CHLORIDE SERPL-SCNC: 91 MMOL/L — LOW (ref 98–107)
CO2 SERPL-SCNC: 25 MMOL/L — SIGNIFICANT CHANGE UP (ref 22–31)
CREAT SERPL-MCNC: 1.12 MG/DL — SIGNIFICANT CHANGE UP (ref 0.5–1.3)
EOSINOPHIL # BLD AUTO: 0.06 K/UL — SIGNIFICANT CHANGE UP (ref 0–0.5)
EOSINOPHIL NFR BLD AUTO: 0.5 % — SIGNIFICANT CHANGE UP (ref 0–6)
GLUCOSE SERPL-MCNC: 146 MG/DL — HIGH (ref 70–99)
HCT VFR BLD CALC: 40.9 % — SIGNIFICANT CHANGE UP (ref 39–50)
HGB BLD-MCNC: 13.3 G/DL — SIGNIFICANT CHANGE UP (ref 13–17)
IMM GRANULOCYTES NFR BLD AUTO: 0.4 % — SIGNIFICANT CHANGE UP (ref 0–1.5)
LYMPHOCYTES # BLD AUTO: 0.95 K/UL — LOW (ref 1–3.3)
LYMPHOCYTES # BLD AUTO: 8.7 % — LOW (ref 13–44)
MCHC RBC-ENTMCNC: 26.5 PG — LOW (ref 27–34)
MCHC RBC-ENTMCNC: 32.5 % — SIGNIFICANT CHANGE UP (ref 32–36)
MCV RBC AUTO: 81.6 FL — SIGNIFICANT CHANGE UP (ref 80–100)
MONOCYTES # BLD AUTO: 0.85 K/UL — SIGNIFICANT CHANGE UP (ref 0–0.9)
MONOCYTES NFR BLD AUTO: 7.8 % — SIGNIFICANT CHANGE UP (ref 2–14)
NEUTROPHILS # BLD AUTO: 9.04 K/UL — HIGH (ref 1.8–7.4)
NEUTROPHILS NFR BLD AUTO: 82.5 % — HIGH (ref 43–77)
PLATELET # BLD AUTO: 245 K/UL — SIGNIFICANT CHANGE UP (ref 150–400)
PMV BLD: 11 FL — SIGNIFICANT CHANGE UP (ref 7–13)
POTASSIUM SERPL-MCNC: 3.7 MMOL/L — SIGNIFICANT CHANGE UP (ref 3.5–5.3)
POTASSIUM SERPL-SCNC: 3.7 MMOL/L — SIGNIFICANT CHANGE UP (ref 3.5–5.3)
RBC # BLD: 5.01 M/UL — SIGNIFICANT CHANGE UP (ref 4.2–5.8)
RBC # FLD: 14.4 % — SIGNIFICANT CHANGE UP (ref 10.3–14.5)
SODIUM SERPL-SCNC: 136 MMOL/L — SIGNIFICANT CHANGE UP (ref 135–145)
WBC # BLD: 10.95 K/UL — HIGH (ref 3.8–10.5)
WBC # FLD AUTO: 10.95 K/UL — HIGH (ref 3.8–10.5)

## 2017-01-20 RX ORDER — SIMETHICONE 80 MG/1
80 TABLET, CHEWABLE ORAL ONCE
Qty: 0 | Refills: 0 | Status: COMPLETED | OUTPATIENT
Start: 2017-01-20 | End: 2017-01-20

## 2017-01-20 RX ORDER — SENNA PLUS 8.6 MG/1
2 TABLET ORAL AT BEDTIME
Qty: 0 | Refills: 0 | Status: DISCONTINUED | OUTPATIENT
Start: 2017-01-20 | End: 2017-01-20

## 2017-01-20 RX ORDER — DOCUSATE SODIUM 100 MG
100 CAPSULE ORAL DAILY
Qty: 0 | Refills: 0 | Status: DISCONTINUED | OUTPATIENT
Start: 2017-01-20 | End: 2017-01-20

## 2017-01-20 RX ADMIN — Medication 100 MILLIGRAM(S): at 05:37

## 2017-01-20 RX ADMIN — OXYCODONE HYDROCHLORIDE 10 MILLIGRAM(S): 5 TABLET ORAL at 12:31

## 2017-01-20 RX ADMIN — SIMETHICONE 80 MILLIGRAM(S): 80 TABLET, CHEWABLE ORAL at 03:20

## 2017-01-20 RX ADMIN — TRAMADOL HYDROCHLORIDE 100 MILLIGRAM(S): 50 TABLET ORAL at 05:37

## 2017-01-20 RX ADMIN — Medication 1 ENEMA: at 06:45

## 2017-01-20 RX ADMIN — SODIUM CHLORIDE 3 MILLILITER(S): 9 INJECTION INTRAMUSCULAR; INTRAVENOUS; SUBCUTANEOUS at 12:31

## 2017-01-20 RX ADMIN — AMLODIPINE BESYLATE 10 MILLIGRAM(S): 2.5 TABLET ORAL at 05:37

## 2017-01-20 RX ADMIN — OXYCODONE HYDROCHLORIDE 10 MILLIGRAM(S): 5 TABLET ORAL at 11:05

## 2017-01-20 RX ADMIN — GABAPENTIN 300 MILLIGRAM(S): 400 CAPSULE ORAL at 05:37

## 2017-01-20 RX ADMIN — SODIUM CHLORIDE 3 MILLILITER(S): 9 INJECTION INTRAMUSCULAR; INTRAVENOUS; SUBCUTANEOUS at 05:38

## 2017-01-20 RX ADMIN — Medication 1 CAPSULE(S): at 05:37

## 2017-01-20 RX ADMIN — ATENOLOL 100 MILLIGRAM(S): 25 TABLET ORAL at 05:37

## 2017-01-30 PROBLEM — E66.9 OBESITY, UNSPECIFIED: Chronic | Status: ACTIVE | Noted: 2017-01-05

## 2017-01-30 PROBLEM — M48.07 SPINAL STENOSIS, LUMBOSACRAL REGION: Chronic | Status: ACTIVE | Noted: 2017-01-05

## 2017-01-30 PROBLEM — I10 ESSENTIAL (PRIMARY) HYPERTENSION: Chronic | Status: ACTIVE | Noted: 2017-01-05

## 2017-01-30 PROBLEM — M51.26 OTHER INTERVERTEBRAL DISC DISPLACEMENT, LUMBAR REGION: Chronic | Status: ACTIVE | Noted: 2017-01-05

## 2017-02-01 ENCOUNTER — APPOINTMENT (OUTPATIENT)
Dept: ORTHOPEDIC SURGERY | Facility: CLINIC | Age: 62
End: 2017-02-01

## 2017-02-01 VITALS — WEIGHT: 234 LBS | HEIGHT: 71 IN | BODY MASS INDEX: 32.76 KG/M2

## 2017-02-17 RX ORDER — OXYCODONE 5 MG/1
5 TABLET ORAL
Qty: 30 | Refills: 0 | Status: DISCONTINUED | COMMUNITY
Start: 2016-09-27 | End: 2017-02-17

## 2017-03-01 ENCOUNTER — APPOINTMENT (OUTPATIENT)
Dept: ORTHOPEDIC SURGERY | Facility: CLINIC | Age: 62
End: 2017-03-01

## 2017-03-21 ENCOUNTER — EMERGENCY (EMERGENCY)
Facility: HOSPITAL | Age: 62
LOS: 1 days | Discharge: HOME | End: 2017-03-21
Attending: EMERGENCY MEDICINE | Admitting: EMERGENCY MEDICINE
Payer: MEDICAID

## 2017-03-21 VITALS
HEART RATE: 88 BPM | SYSTOLIC BLOOD PRESSURE: 157 MMHG | TEMPERATURE: 98 F | DIASTOLIC BLOOD PRESSURE: 90 MMHG | RESPIRATION RATE: 20 BRPM | OXYGEN SATURATION: 100 %

## 2017-03-21 DIAGNOSIS — Z98.89 OTHER SPECIFIED POSTPROCEDURAL STATES: Chronic | ICD-10-CM

## 2017-03-21 PROCEDURE — 99284 EMERGENCY DEPT VISIT MOD MDM: CPT | Mod: 25

## 2017-03-21 PROCEDURE — 93971 EXTREMITY STUDY: CPT | Mod: 26

## 2017-03-21 NOTE — ED PROVIDER NOTE - PROGRESS NOTE DETAILS
62yo male with pmh of HTN., s/p spinal surgery 2 months ago, presents with LLE edema for 3 months. Pt states he has been ambulating with a cane after surgery and has had LLE edema more at the back of the knee. Pt looked up his symptoms concerned for bakers cyst or blood clot. Pt reports pain at back of knee no calf pain. Pt denies cp/sob/palp, ho clots, abd pain/n/v/d, urinary symptoms, recent travel. LLE edema non tender, edema pop fossa, r/o DVT vs baker's cyst - US duplex, pain meds if needed bedside US done neg for DVT, pt will follow up with PMD, stable for dc bedside US done neg for DVT, pt will follow up with PMD, stable for dc. Pt requesting MRI to r/o cancer, advised pt to f/u with PMD, pt agreeable.

## 2017-03-21 NOTE — ED PROVIDER NOTE - DETAILS:
Dr. Anne: I have personally performed a face to face bedside history and physical examination of this patient. I have discussed the history, examination, review of systems, assessment and plan of management with the resident. I have reviewed the electronic medical record and amended it to reflect my history, review of systems, physical exam, assessment and plan.

## 2017-03-21 NOTE — ED PROVIDER NOTE - OBJECTIVE STATEMENT
60 yo male with PMH of HTN presents to the ED with left leg swelling x 3 months. Pt looked up symptoms and believes he had Baker's cyst. Pt presents to r/o DVT since swelling is persisting. No CP nor SOB. No pain nor trauma. No recent travel nor immobility.  PSX: Spinal stenosis L4L5L6, walks with walker. 60 yo male with PMH of HTN presents to the ED with left leg swelling x 3 months. Pt looked up symptoms and believes he had Baker's cyst. Pt presents to r/o DVT since swelling is persisting. No CP nor SOB. No pain nor trauma. No recent travel nor immobility. No personal or fam h/o DVT or PE.   PSX: Spinal stenosis L4L5L6, walks with walker. S/p surgery in Jan 2017.

## 2017-03-21 NOTE — ED ADULT TRIAGE NOTE - CHIEF COMPLAINT QUOTE
Pt c/o lower leg edema x 3 months  left leg more then right. Pt denies sob states his gabapentin was increased.

## 2017-03-21 NOTE — ED PROVIDER NOTE - NS ED MD SCRIBE ATTENDING SCRIBE SECTIONS
HIV/VITAL SIGNS( Pullset)/REVIEW OF SYSTEMS/PHYSICAL EXAM/DISPOSITION/HISTORY OF PRESENT ILLNESS/PAST MEDICAL/SURGICAL/SOCIAL HISTORY

## 2017-03-30 NOTE — H&P PST ADULT - RS GEN PE MLT RESP DETAILS PC
Chief Complaint   Patient presents with   • Eye Exam     The patient is here for complete exam to follow diabetes. Last Hgba1c was 5.8 on 11/09/16.   He says running 120-130 in the morning. Says not having any problems with his vision. He is using only readers.       ROS:  1. Do you have pain?  no  2. Do you have fever, chills, sweats or weight change? no  3. Do you have headaches or seizures? no  4. Do you have ringing in your ear/s or hearing loss? no  5. Do you have chest pain, palpitations or heart murmur? no  6. Do you have shortness of breath or wheezing? no  7. Do you have abdominal pain, nausea, vomiting, diarrhea or constipation? no  8. Do you have pain, frequent or difficulty with urination? no  9. Do you have joint or back pain? no  10. Do you have weakness, numbness or tingling? no  11. Do you have skin changes such as a rash? no  12. Do you experience easy bruising or bleeding? no  13. Are you depressed? no      Hemoglobin A1C (%)   Date Value   11/09/2016 5.8 (H)   05/09/2016 6.5 (H)     CHOLESTEROL (mg/dL)   Date Value   08/02/2016 181   05/09/2016 157     HDL (mg/dL)   Date Value   08/02/2016 55   05/09/2016 53     CALCULATED LDL (mg/dL)   Date Value   08/02/2016 107 (H)   05/09/2016 90     TRIGLYCERIDE (mg/dL)   Date Value   08/02/2016 97   05/09/2016 71     No results found for: LDLDIR  Creatinine (mg/dL)   Date Value   11/09/2016 1.09   08/02/2016 1.19     MICROALBUMIN, UA (TTL) (mg/dL)   Date Value   11/15/2016 <0.50   11/16/2015 <0.50     MICROALBUMIN/CREATININE (mcg/mg)   Date Value   11/15/2016     UNABLE TO CALCULATE DUE TO LOW ANALYTE CONCENTRATION.   11/16/2015     UNABLE TO CALCULATE DUE TO LOW ANALYTE CONCENTRATION.     No results found for: 24PROT  No results found for: 24CREAT  No results found for: CRCL7    Visit Blood Pressure:   11/15/2016   126/80  5/16/2016   122/78       Current Medications    ACCU-CHEK COMPACT PLUS STRIP    TEST DAILY OR AS DIRECTED    JANUMET  MG PER  TABLET    Take 1 tablet by mouth two  times daily with meals       I have reviewed the patient's medications and allergies, past medical, surgical, social and family history, updating these as appropriate.  See Histories section of the electronic medical record for a display of this information.    HISTORY OF PRESENT ILLNESS: The patient is a pleasant 50 year old  who comes in today for an annual diabetic eye examination.  Available labs are reviewed.  The patient's PCP is Meño Heath MD.  -130.  A1C was 5.8. Patient has adapted diet.        PAST MEDICAL HISTORY:  Past Medical History:   Diagnosis Date   • Arthritis    • Elevated liver enzymes     Remote hx of   • Seasonal allergies    • Sleep apnea    • Type II or unspecified type diabetes mellitus without mention of complication, not stated as uncontrolled        SURGICAL HISTORY:  Past Surgical History:   Procedure Laterality Date   • BACK SURGERY     • KNEE SURGERY      right   • PALATE/UVULA SURGERY UNLISTED         FAMILY HISTORY:  Family History   Problem Relation Age of Onset   • Diabetes Mother    • Glaucoma Mother    • Diabetes Sister        SOCIAL HISTORY:  Social History     Social History   • Marital status:      Spouse name: N/A   • Number of children: N/A   • Years of education: N/A     Occupational History   • Not on file.     Social History Main Topics   • Smoking status: Never Smoker   • Smokeless tobacco: Former User   • Alcohol use No   • Drug use: Not on file   • Sexual activity: Not on file     Other Topics Concern   • Not on file     Social History Narrative       Current Medications    ACCU-CHEK COMPACT PLUS STRIP    TEST DAILY OR AS DIRECTED    JANUMET  MG PER TABLET    Take 1 tablet by mouth two  times daily with meals         Assessment   Presbyopia ou  Diabetes without retinopathy, bilateral  Nuclear sclerosis, bilateral         Plan   1. Continue using reading glasses.   2. The concept of HbA1C was discussed with  the patient and it's impact on the eye was detailed.  Explained the importance of good blood sugar control.  Advised patient to keep HbA1c below 7 to decrease the risk of diabetic eye disease and vision loss associated with diabetes.  We additionally discussed the importance of blood sugar and blood pressure control, diet and exercise.  Compliance with the treatment recommended by the PCP was stressed.Retinal exam findings were communicated via fax to the Physician managing this patient's diabetes.  3. The presence of cataracts was reviewed with this patient.  Surgery was discussed. The patient was advised that cataract surgery is elective. Decision making process was discussed. The patient should choose surgery when they feel that vision problems are significantly interfering with activities of daily living.        The patient's eyes were dilated as part of this examination.  he has been educated that he will be light sensitive and have blurry near vision for 4-6 hours.  Disposable sunglasses were provided.      Heydi Young O.D.  [address] [place] [address] Dr  [place] [id number] [phone]         breath sounds equal/respirations non-labored/good air movement/no rales/no subcutaneous emphysema/clear to auscultation bilaterally/no intercostal retractions/no rhonchi/airway patent/no wheezes/no chest wall tenderness

## 2017-04-12 ENCOUNTER — APPOINTMENT (OUTPATIENT)
Dept: ORTHOPEDIC SURGERY | Facility: CLINIC | Age: 62
End: 2017-04-12

## 2017-04-20 ENCOUNTER — APPOINTMENT (OUTPATIENT)
Dept: ORTHOPEDIC SURGERY | Facility: CLINIC | Age: 62
End: 2017-04-20

## 2017-04-20 VITALS
DIASTOLIC BLOOD PRESSURE: 91 MMHG | HEART RATE: 85 BPM | HEIGHT: 71 IN | WEIGHT: 230 LBS | BODY MASS INDEX: 32.2 KG/M2 | SYSTOLIC BLOOD PRESSURE: 134 MMHG

## 2017-05-09 ENCOUNTER — APPOINTMENT (OUTPATIENT)
Dept: ORTHOPEDIC SURGERY | Facility: CLINIC | Age: 62
End: 2017-05-09

## 2017-05-09 VITALS — HEIGHT: 71 IN | BODY MASS INDEX: 32.2 KG/M2 | WEIGHT: 230 LBS

## 2017-05-09 DIAGNOSIS — M79.642 PAIN IN LEFT HAND: ICD-10-CM

## 2017-06-09 ENCOUNTER — APPOINTMENT (OUTPATIENT)
Dept: NEUROLOGY | Facility: CLINIC | Age: 62
End: 2017-06-09

## 2017-07-19 ENCOUNTER — APPOINTMENT (OUTPATIENT)
Dept: ORTHOPEDIC SURGERY | Facility: CLINIC | Age: 62
End: 2017-07-19

## 2017-09-08 ENCOUNTER — APPOINTMENT (OUTPATIENT)
Dept: ORTHOPEDIC SURGERY | Facility: CLINIC | Age: 62
End: 2017-09-08
Payer: MEDICAID

## 2017-09-08 VITALS
HEIGHT: 71 IN | SYSTOLIC BLOOD PRESSURE: 121 MMHG | DIASTOLIC BLOOD PRESSURE: 75 MMHG | BODY MASS INDEX: 32.2 KG/M2 | HEART RATE: 66 BPM | WEIGHT: 230 LBS

## 2017-09-08 PROCEDURE — 73502 X-RAY EXAM HIP UNI 2-3 VIEWS: CPT | Mod: RT

## 2017-09-08 PROCEDURE — 99214 OFFICE O/P EST MOD 30 MIN: CPT

## 2017-09-08 RX ORDER — BACLOFEN 10 MG/1
10 TABLET ORAL
Qty: 90 | Refills: 0 | Status: ACTIVE | COMMUNITY
Start: 2017-04-28

## 2017-09-08 RX ORDER — LOSARTAN POTASSIUM 50 MG/1
50 TABLET, FILM COATED ORAL
Qty: 30 | Refills: 0 | Status: ACTIVE | COMMUNITY
Start: 2017-03-28

## 2017-09-08 RX ORDER — OXYCODONE 20 MG/1
20 TABLET ORAL
Qty: 120 | Refills: 0 | Status: ACTIVE | COMMUNITY
Start: 2017-03-27

## 2017-09-15 ENCOUNTER — APPOINTMENT (OUTPATIENT)
Dept: NEUROLOGY | Facility: CLINIC | Age: 62
End: 2017-09-15

## 2017-10-24 ENCOUNTER — APPOINTMENT (OUTPATIENT)
Dept: NEUROLOGY | Facility: CLINIC | Age: 62
End: 2017-10-24

## 2017-10-27 ENCOUNTER — APPOINTMENT (OUTPATIENT)
Dept: ORTHOPEDIC SURGERY | Facility: CLINIC | Age: 62
End: 2017-10-27

## 2017-10-30 ENCOUNTER — APPOINTMENT (OUTPATIENT)
Dept: UROLOGY | Facility: CLINIC | Age: 62
End: 2017-10-30
Payer: MEDICAID

## 2017-10-30 VITALS
SYSTOLIC BLOOD PRESSURE: 126 MMHG | HEIGHT: 70.47 IN | TEMPERATURE: 97.6 F | DIASTOLIC BLOOD PRESSURE: 71 MMHG | OXYGEN SATURATION: 100 % | WEIGHT: 222 LBS | RESPIRATION RATE: 17 BRPM | BODY MASS INDEX: 31.43 KG/M2 | HEART RATE: 68 BPM

## 2017-10-30 PROCEDURE — 51798 US URINE CAPACITY MEASURE: CPT

## 2017-10-30 PROCEDURE — 99203 OFFICE O/P NEW LOW 30 MIN: CPT

## 2017-10-31 LAB
BILIRUB UR QL STRIP: NEGATIVE
GLUCOSE UR-MCNC: NEGATIVE
HGB UR QL STRIP.AUTO: NEGATIVE
KETONES UR-MCNC: NEGATIVE
LEUKOCYTE ESTERASE UR QL STRIP: NEGATIVE
NITRITE UR QL STRIP: NEGATIVE
PH UR STRIP: 5.5
SP GR UR STRIP: 1.02

## 2017-11-08 ENCOUNTER — APPOINTMENT (OUTPATIENT)
Dept: ORTHOPEDIC SURGERY | Facility: CLINIC | Age: 62
End: 2017-11-08
Payer: MEDICAID

## 2017-11-08 PROCEDURE — 99214 OFFICE O/P EST MOD 30 MIN: CPT

## 2017-11-10 ENCOUNTER — OUTPATIENT (OUTPATIENT)
Dept: OUTPATIENT SERVICES | Facility: HOSPITAL | Age: 62
LOS: 1 days | End: 2017-11-10
Payer: MEDICAID

## 2017-11-10 ENCOUNTER — APPOINTMENT (OUTPATIENT)
Dept: UROLOGY | Facility: CLINIC | Age: 62
End: 2017-11-10
Payer: MEDICAID

## 2017-11-10 VITALS
RESPIRATION RATE: 16 BRPM | SYSTOLIC BLOOD PRESSURE: 155 MMHG | TEMPERATURE: 98.1 F | HEART RATE: 74 BPM | DIASTOLIC BLOOD PRESSURE: 69 MMHG

## 2017-11-10 DIAGNOSIS — Z98.89 OTHER SPECIFIED POSTPROCEDURAL STATES: Chronic | ICD-10-CM

## 2017-11-10 DIAGNOSIS — R39.15 URGENCY OF URINATION: ICD-10-CM

## 2017-11-10 DIAGNOSIS — R35.0 FREQUENCY OF MICTURITION: ICD-10-CM

## 2017-11-10 LAB
PSA FREE FLD-MCNC: 35.7
PSA FREE SERPL-MCNC: 0.2 NG/ML
PSA SERPL-MCNC: 0.56 NG/ML

## 2017-11-10 PROCEDURE — 51741 ELECTRO-UROFLOWMETRY FIRST: CPT

## 2017-11-10 PROCEDURE — 51728 CYSTOMETROGRAM W/VP: CPT

## 2017-11-10 PROCEDURE — 51797 INTRAABDOMINAL PRESSURE TEST: CPT | Mod: 26

## 2017-11-10 PROCEDURE — 51784 ANAL/URINARY MUSCLE STUDY: CPT | Mod: 26

## 2017-11-10 PROCEDURE — 51741 ELECTRO-UROFLOWMETRY FIRST: CPT | Mod: 26

## 2017-11-10 PROCEDURE — 51728 CYSTOMETROGRAM W/VP: CPT | Mod: 26

## 2017-11-10 PROCEDURE — 51797 INTRAABDOMINAL PRESSURE TEST: CPT

## 2017-11-10 PROCEDURE — 51784 ANAL/URINARY MUSCLE STUDY: CPT

## 2017-11-13 DIAGNOSIS — N39.41 URGE INCONTINENCE: ICD-10-CM

## 2017-11-13 DIAGNOSIS — R39.15 URGENCY OF URINATION: ICD-10-CM

## 2017-11-27 ENCOUNTER — APPOINTMENT (OUTPATIENT)
Dept: ORTHOPEDIC SURGERY | Facility: CLINIC | Age: 62
End: 2017-11-27
Payer: MEDICAID

## 2017-11-27 PROCEDURE — 73080 X-RAY EXAM OF ELBOW: CPT | Mod: LT

## 2017-11-27 PROCEDURE — 99214 OFFICE O/P EST MOD 30 MIN: CPT

## 2017-11-29 ENCOUNTER — APPOINTMENT (OUTPATIENT)
Dept: ORTHOPEDIC SURGERY | Facility: CLINIC | Age: 62
End: 2017-11-29
Payer: MEDICAID

## 2017-11-29 PROCEDURE — 99214 OFFICE O/P EST MOD 30 MIN: CPT

## 2017-11-29 PROCEDURE — 72040 X-RAY EXAM NECK SPINE 2-3 VW: CPT

## 2017-12-05 ENCOUNTER — OUTPATIENT (OUTPATIENT)
Dept: OUTPATIENT SERVICES | Facility: HOSPITAL | Age: 62
LOS: 1 days | End: 2017-12-05
Payer: MEDICAID

## 2017-12-05 VITALS
HEIGHT: 71 IN | SYSTOLIC BLOOD PRESSURE: 160 MMHG | RESPIRATION RATE: 16 BRPM | WEIGHT: 227.96 LBS | TEMPERATURE: 98 F | DIASTOLIC BLOOD PRESSURE: 66 MMHG | OXYGEN SATURATION: 98 % | HEART RATE: 60 BPM

## 2017-12-05 DIAGNOSIS — M48.07 SPINAL STENOSIS, LUMBOSACRAL REGION: ICD-10-CM

## 2017-12-05 DIAGNOSIS — M48.02 SPINAL STENOSIS, CERVICAL REGION: ICD-10-CM

## 2017-12-05 DIAGNOSIS — Z98.89 OTHER SPECIFIED POSTPROCEDURAL STATES: Chronic | ICD-10-CM

## 2017-12-05 DIAGNOSIS — I10 ESSENTIAL (PRIMARY) HYPERTENSION: ICD-10-CM

## 2017-12-05 DIAGNOSIS — G47.33 OBSTRUCTIVE SLEEP APNEA (ADULT) (PEDIATRIC): ICD-10-CM

## 2017-12-05 DIAGNOSIS — Z98.890 OTHER SPECIFIED POSTPROCEDURAL STATES: Chronic | ICD-10-CM

## 2017-12-05 LAB
BLD GP AB SCN SERPL QL: NEGATIVE — SIGNIFICANT CHANGE UP
BUN SERPL-MCNC: 34 MG/DL — HIGH (ref 7–23)
CALCIUM SERPL-MCNC: 8.8 MG/DL — SIGNIFICANT CHANGE UP (ref 8.4–10.5)
CHLORIDE SERPL-SCNC: 104 MMOL/L — SIGNIFICANT CHANGE UP (ref 98–107)
CO2 SERPL-SCNC: 24 MMOL/L — SIGNIFICANT CHANGE UP (ref 22–31)
CREAT SERPL-MCNC: 2.78 MG/DL — HIGH (ref 0.5–1.3)
GLUCOSE SERPL-MCNC: 84 MG/DL — SIGNIFICANT CHANGE UP (ref 70–99)
HCT VFR BLD CALC: 34.5 % — LOW (ref 39–50)
HGB BLD-MCNC: 10.8 G/DL — LOW (ref 13–17)
MCHC RBC-ENTMCNC: 25.8 PG — LOW (ref 27–34)
MCHC RBC-ENTMCNC: 31.3 % — LOW (ref 32–36)
MCV RBC AUTO: 82.5 FL — SIGNIFICANT CHANGE UP (ref 80–100)
NRBC # FLD: 0 — SIGNIFICANT CHANGE UP
PLATELET # BLD AUTO: 261 K/UL — SIGNIFICANT CHANGE UP (ref 150–400)
PMV BLD: 11 FL — SIGNIFICANT CHANGE UP (ref 7–13)
POTASSIUM SERPL-MCNC: 4.5 MMOL/L — SIGNIFICANT CHANGE UP (ref 3.5–5.3)
POTASSIUM SERPL-SCNC: 4.5 MMOL/L — SIGNIFICANT CHANGE UP (ref 3.5–5.3)
RBC # BLD: 4.18 M/UL — LOW (ref 4.2–5.8)
RBC # FLD: 14.6 % — HIGH (ref 10.3–14.5)
RH IG SCN BLD-IMP: POSITIVE — SIGNIFICANT CHANGE UP
SODIUM SERPL-SCNC: 142 MMOL/L — SIGNIFICANT CHANGE UP (ref 135–145)
WBC # BLD: 6.4 K/UL — SIGNIFICANT CHANGE UP (ref 3.8–10.5)
WBC # FLD AUTO: 6.4 K/UL — SIGNIFICANT CHANGE UP (ref 3.8–10.5)

## 2017-12-05 PROCEDURE — 93010 ELECTROCARDIOGRAM REPORT: CPT

## 2017-12-05 RX ORDER — SODIUM CHLORIDE 9 MG/ML
3 INJECTION INTRAMUSCULAR; INTRAVENOUS; SUBCUTANEOUS EVERY 8 HOURS
Qty: 0 | Refills: 0 | Status: DISCONTINUED | OUTPATIENT
Start: 2017-12-07 | End: 2017-12-11

## 2017-12-05 RX ORDER — SODIUM CHLORIDE 9 MG/ML
1000 INJECTION, SOLUTION INTRAVENOUS
Qty: 0 | Refills: 0 | Status: DISCONTINUED | OUTPATIENT
Start: 2017-12-07 | End: 2017-12-08

## 2017-12-05 RX ORDER — FENOFIBRATE,MICRONIZED 130 MG
1 CAPSULE ORAL
Qty: 0 | Refills: 0 | COMMUNITY

## 2017-12-05 RX ORDER — ATENOLOL 25 MG/1
1 TABLET ORAL
Qty: 0 | Refills: 0 | COMMUNITY

## 2017-12-05 RX ORDER — AMLODIPINE BESYLATE 2.5 MG/1
1 TABLET ORAL
Qty: 0 | Refills: 0 | COMMUNITY

## 2017-12-05 RX ORDER — DOCUSATE SODIUM 100 MG
1 CAPSULE ORAL
Qty: 0 | Refills: 0 | COMMUNITY

## 2017-12-05 RX ORDER — GABAPENTIN 400 MG/1
1 CAPSULE ORAL
Qty: 0 | Refills: 0 | COMMUNITY

## 2017-12-05 NOTE — H&P PST ADULT - GENERAL GENITOURINARY SYMPTOMS
increased urinary frequency/incontinence/pt reports leaking of urine. incontinence/pt reports leakage of urine. On meds for management/increased urinary frequency

## 2017-12-05 NOTE — H&P PST ADULT - NEGATIVE CARDIOVASCULAR SYMPTOMS
no claudication/no dyspnea on exertion/no orthopnea/no chest pain/no paroxysmal nocturnal dyspnea/no palpitations

## 2017-12-05 NOTE — H&P PST ADULT - PROBLEM SELECTOR PLAN 1
Scheduled for C3-6 Posterior Cervical Decompression and Fusion on 12/7/2017.   Pre op instructions given, pt verbalized understanding   Chlorhexidine wash and GI prophylaxis provided  Pt to see his PCP for medical evaluation today 12/5

## 2017-12-05 NOTE — H&P PST ADULT - PROBLEM SELECTOR PLAN 2
Pt instructed to take Atenolol and Losartan AM of surgery with a sip of water Pt instructed to take Atenolol and Losartan AM of surgery with a sip of water  Recent ECHO report requested

## 2017-12-05 NOTE — H&P PST ADULT - CONTACT INFO FOR SLEEP STUDY
Sleep Center at [facility]. PCP also has results Sleep Center at [facility]. PCP also has results of sleep study

## 2017-12-05 NOTE — H&P PST ADULT - NEGATIVE GENERAL SYMPTOMS
no sweating/no fatigue/no polyphagia/no weight loss/no polydipsia/no weight gain/no malaise/no fever/no chills

## 2017-12-05 NOTE — H&P PST ADULT - RS GEN PE MLT RESP DETAILS PC
airway patent/respirations non-labored/breath sounds equal/clear to auscultation bilaterally/no chest wall tenderness/good air movement

## 2017-12-05 NOTE — H&P PST ADULT - PMH
Back pain  chronic  BPH (benign prostatic hyperplasia)    HTN (hypertension)    OA (osteoarthritis of the spine)  of right hip  Obesity (BMI 30.0-34.9)    CHRISSIE on CPAP    Other intervertebral disc displacement, lumbar region    Spinal stenosis in cervical region    Spinal stenosis, lumbosacral region

## 2017-12-05 NOTE — H&P PST ADULT - LYMPHATIC
anterior cervical R/supraclavicular L/posterior cervical L/anterior cervical L/supraclavicular R/posterior cervical R

## 2017-12-05 NOTE — H&P PST ADULT - MS GEN HX ROS MEA POS PC
joint pain/neck pain/arthritis/weakness of jaguar LE/back pain/joint swelling/muscle weakness muscle weakness/neck pain/back pain/joint swelling/joint pain/arthritis/muscle weakness of jaguar LE

## 2017-12-05 NOTE — H&P PST ADULT - VISION (WITH CORRECTIVE LENSES IF THE PATIENT USUALLY WEARS THEM):
Partially impaired: cannot see medication labels or newsprint, but can see obstacles in path, and the surrounding layout; can count fingers at arm's length Partially impaired: cannot see medication labels or newsprint, but can see obstacles in path, and the surrounding layout; can count fingers at arm's length/glasses as needed

## 2017-12-05 NOTE — H&P PST ADULT - PSH
H/O laminectomy  and fusion in Jan 2017  History of hip surgery  s/p right hup replacement H/O laminectomy  and fusion in Jan 2017  History of hip surgery  s/p right hip replacement

## 2017-12-05 NOTE — H&P PST ADULT - HISTORY OF PRESENT ILLNESS
61 y/o male with PMH of HTN, CHRISSIE on CPAP, HLD and Obesity presents to PST for preoperative evaluation with dx of spinal stenosis, cervical region. Pt reports increased pain of the cervical spine for 2 months. Pt reports weakness of his bilateral upper extremities with associated numbness and tingling. s/p Lumbar Laminectomy and Fusion in January 2017. Scheduled for C3-6 Posterior Cervical Decompression and Fusion on 12/7/2017. 61 y/o male with PMH of HTN, CHRISSIE not compliant with CPAP, HLD and Obesity presents to PST for preoperative evaluation with dx of spinal stenosis, cervical region. Pt reports increased pain of the cervical spine for 2 months. He reports weakness of his bilateral upper extremities with associated numbness and tingling. s/p Lumbar Laminectomy and Fusion January 2017. Scheduled for C3-6 Posterior Cervical Decompression and Fusion on 12/7/2017.

## 2017-12-05 NOTE — H&P PST ADULT - NEGATIVE NEUROLOGICAL SYMPTOMS
no headache/no confusion/no vertigo/no focal seizures/no loss of sensation/no weakness/no tremors/no transient paralysis/no hemiparesis/no syncope

## 2017-12-06 ENCOUNTER — FORM ENCOUNTER (OUTPATIENT)
Age: 62
End: 2017-12-06

## 2017-12-06 NOTE — ASU PATIENT PROFILE, ADULT - VISION (WITH CORRECTIVE LENSES IF THE PATIENT USUALLY WEARS THEM):
Partially impaired: cannot see medication labels or newsprint, but can see obstacles in path, and the surrounding layout; can count fingers at arm's length/glasses as needed

## 2017-12-07 ENCOUNTER — TRANSCRIPTION ENCOUNTER (OUTPATIENT)
Age: 62
End: 2017-12-07

## 2017-12-07 ENCOUNTER — APPOINTMENT (OUTPATIENT)
Dept: ORTHOPEDIC SURGERY | Facility: HOSPITAL | Age: 62
End: 2017-12-07

## 2017-12-07 ENCOUNTER — INPATIENT (INPATIENT)
Facility: HOSPITAL | Age: 62
LOS: 3 days | Discharge: HOME | End: 2017-12-11
Attending: STUDENT IN AN ORGANIZED HEALTH CARE EDUCATION/TRAINING PROGRAM | Admitting: STUDENT IN AN ORGANIZED HEALTH CARE EDUCATION/TRAINING PROGRAM
Payer: MEDICAID

## 2017-12-07 VITALS
HEIGHT: 71 IN | RESPIRATION RATE: 16 BRPM | DIASTOLIC BLOOD PRESSURE: 67 MMHG | SYSTOLIC BLOOD PRESSURE: 102 MMHG | WEIGHT: 227.96 LBS | HEART RATE: 63 BPM | OXYGEN SATURATION: 97 % | TEMPERATURE: 98 F

## 2017-12-07 DIAGNOSIS — Z98.890 OTHER SPECIFIED POSTPROCEDURAL STATES: Chronic | ICD-10-CM

## 2017-12-07 DIAGNOSIS — Z98.89 OTHER SPECIFIED POSTPROCEDURAL STATES: Chronic | ICD-10-CM

## 2017-12-07 DIAGNOSIS — M48.02 SPINAL STENOSIS, CERVICAL REGION: ICD-10-CM

## 2017-12-07 LAB
BASE EXCESS BLDA CALC-SCNC: 0.7 MMOL/L — SIGNIFICANT CHANGE UP
CA-I BLDA-SCNC: 1.19 MMOL/L — SIGNIFICANT CHANGE UP (ref 1.15–1.29)
GLUCOSE BLDA-MCNC: 150 MG/DL — HIGH (ref 70–99)
HCO3 BLDA-SCNC: 25 MMOL/L — SIGNIFICANT CHANGE UP (ref 22–26)
HCT VFR BLDA CALC: 33.8 % — LOW (ref 39–51)
HGB BLDA-MCNC: 11 G/DL — LOW (ref 13–17)
PCO2 BLDA: 39 MMHG — SIGNIFICANT CHANGE UP (ref 35–48)
PH BLDA: 7.42 PH — SIGNIFICANT CHANGE UP (ref 7.35–7.45)
PO2 BLDA: 463 MMHG — HIGH (ref 83–108)
POTASSIUM BLDA-SCNC: 3.7 MMOL/L — SIGNIFICANT CHANGE UP (ref 3.4–4.5)
SAO2 % BLDA: 99.7 % — HIGH (ref 95–99)
SODIUM BLDA-SCNC: 137 MMOL/L — SIGNIFICANT CHANGE UP (ref 136–146)

## 2017-12-07 PROCEDURE — 22614 ARTHRD PST TQ 1NTRSPC EA ADD: CPT

## 2017-12-07 PROCEDURE — 22842 INSERT SPINE FIXATION DEVICE: CPT | Mod: 82

## 2017-12-07 PROCEDURE — 63045 LAM FACETEC & FORAMOT CRV: CPT

## 2017-12-07 PROCEDURE — 63048 LAM FACETEC &FORAMOT EA ADDL: CPT | Mod: 82

## 2017-12-07 PROCEDURE — 22600 ARTHRD PST TQ 1NTRSPC CRV: CPT | Mod: 82

## 2017-12-07 PROCEDURE — 22614 ARTHRD PST TQ 1NTRSPC EA ADD: CPT | Mod: 82

## 2017-12-07 PROCEDURE — 22842 INSERT SPINE FIXATION DEVICE: CPT

## 2017-12-07 PROCEDURE — 22600 ARTHRD PST TQ 1NTRSPC CRV: CPT

## 2017-12-07 PROCEDURE — 63048 LAM FACETEC &FORAMOT EA ADDL: CPT

## 2017-12-07 PROCEDURE — 63045 LAM FACETEC & FORAMOT CRV: CPT | Mod: 82

## 2017-12-07 RX ORDER — ACETAMINOPHEN 500 MG
650 TABLET ORAL EVERY 6 HOURS
Qty: 0 | Refills: 0 | Status: DISCONTINUED | OUTPATIENT
Start: 2017-12-07 | End: 2017-12-08

## 2017-12-07 RX ORDER — OXYBUTYNIN CHLORIDE 5 MG
10 TABLET ORAL DAILY
Qty: 0 | Refills: 0 | Status: DISCONTINUED | OUTPATIENT
Start: 2017-12-07 | End: 2017-12-08

## 2017-12-07 RX ORDER — NALOXONE HYDROCHLORIDE 4 MG/.1ML
0.1 SPRAY NASAL
Qty: 0 | Refills: 0 | Status: DISCONTINUED | OUTPATIENT
Start: 2017-12-07 | End: 2017-12-11

## 2017-12-07 RX ORDER — SODIUM CHLORIDE 9 MG/ML
1000 INJECTION, SOLUTION INTRAVENOUS
Qty: 0 | Refills: 0 | Status: DISCONTINUED | OUTPATIENT
Start: 2017-12-07 | End: 2017-12-11

## 2017-12-07 RX ORDER — ONDANSETRON 8 MG/1
4 TABLET, FILM COATED ORAL EVERY 6 HOURS
Qty: 0 | Refills: 0 | Status: DISCONTINUED | OUTPATIENT
Start: 2017-12-07 | End: 2017-12-11

## 2017-12-07 RX ORDER — TRIAMTERENE/HYDROCHLOROTHIAZID 75 MG-50MG
1 TABLET ORAL DAILY
Qty: 0 | Refills: 0 | Status: DISCONTINUED | OUTPATIENT
Start: 2017-12-07 | End: 2017-12-09

## 2017-12-07 RX ORDER — CEFAZOLIN SODIUM 1 G
2000 VIAL (EA) INJECTION EVERY 8 HOURS
Qty: 0 | Refills: 0 | Status: COMPLETED | OUTPATIENT
Start: 2017-12-07 | End: 2017-12-08

## 2017-12-07 RX ORDER — ATENOLOL 25 MG/1
100 TABLET ORAL DAILY
Qty: 0 | Refills: 0 | Status: DISCONTINUED | OUTPATIENT
Start: 2017-12-07 | End: 2017-12-09

## 2017-12-07 RX ORDER — FENOFIBRATE,MICRONIZED 130 MG
145 CAPSULE ORAL DAILY
Qty: 0 | Refills: 0 | Status: DISCONTINUED | OUTPATIENT
Start: 2017-12-07 | End: 2017-12-11

## 2017-12-07 RX ORDER — DIPHENHYDRAMINE HCL 50 MG
12.5 CAPSULE ORAL EVERY 4 HOURS
Qty: 0 | Refills: 0 | Status: DISCONTINUED | OUTPATIENT
Start: 2017-12-07 | End: 2017-12-11

## 2017-12-07 RX ORDER — HYDROMORPHONE HYDROCHLORIDE 2 MG/ML
1 INJECTION INTRAMUSCULAR; INTRAVENOUS; SUBCUTANEOUS
Qty: 0 | Refills: 0 | Status: DISCONTINUED | OUTPATIENT
Start: 2017-12-07 | End: 2017-12-11

## 2017-12-07 RX ORDER — HYDROMORPHONE HYDROCHLORIDE 2 MG/ML
1 INJECTION INTRAMUSCULAR; INTRAVENOUS; SUBCUTANEOUS
Qty: 0 | Refills: 0 | Status: DISCONTINUED | OUTPATIENT
Start: 2017-12-07 | End: 2017-12-08

## 2017-12-07 RX ORDER — DIPHENHYDRAMINE HCL 50 MG
25 CAPSULE ORAL EVERY 4 HOURS
Qty: 0 | Refills: 0 | Status: DISCONTINUED | OUTPATIENT
Start: 2017-12-07 | End: 2017-12-11

## 2017-12-07 RX ORDER — LOSARTAN POTASSIUM 100 MG/1
50 TABLET, FILM COATED ORAL DAILY
Qty: 0 | Refills: 0 | Status: DISCONTINUED | OUTPATIENT
Start: 2017-12-07 | End: 2017-12-09

## 2017-12-07 RX ORDER — FUROSEMIDE 40 MG
20 TABLET ORAL EVERY OTHER DAY
Qty: 0 | Refills: 0 | Status: DISCONTINUED | OUTPATIENT
Start: 2017-12-07 | End: 2017-12-11

## 2017-12-07 RX ORDER — SENNA PLUS 8.6 MG/1
2 TABLET ORAL AT BEDTIME
Qty: 0 | Refills: 0 | Status: DISCONTINUED | OUTPATIENT
Start: 2017-12-07 | End: 2017-12-11

## 2017-12-07 RX ORDER — TAMSULOSIN HYDROCHLORIDE 0.4 MG/1
0.4 CAPSULE ORAL AT BEDTIME
Qty: 0 | Refills: 0 | Status: DISCONTINUED | OUTPATIENT
Start: 2017-12-07 | End: 2017-12-11

## 2017-12-07 RX ORDER — ONDANSETRON 8 MG/1
4 TABLET, FILM COATED ORAL EVERY 6 HOURS
Qty: 0 | Refills: 0 | Status: DISCONTINUED | OUTPATIENT
Start: 2017-12-07 | End: 2017-12-08

## 2017-12-07 RX ORDER — BACLOFEN 100 %
10 POWDER (GRAM) MISCELLANEOUS THREE TIMES A DAY
Qty: 0 | Refills: 0 | Status: DISCONTINUED | OUTPATIENT
Start: 2017-12-07 | End: 2017-12-11

## 2017-12-07 RX ORDER — MAGNESIUM HYDROXIDE 400 MG/1
30 TABLET, CHEWABLE ORAL EVERY 12 HOURS
Qty: 0 | Refills: 0 | Status: DISCONTINUED | OUTPATIENT
Start: 2017-12-07 | End: 2017-12-11

## 2017-12-07 RX ORDER — ONDANSETRON 8 MG/1
4 TABLET, FILM COATED ORAL ONCE
Qty: 0 | Refills: 0 | Status: DISCONTINUED | OUTPATIENT
Start: 2017-12-07 | End: 2017-12-08

## 2017-12-07 RX ORDER — GABAPENTIN 400 MG/1
800 CAPSULE ORAL
Qty: 0 | Refills: 0 | Status: DISCONTINUED | OUTPATIENT
Start: 2017-12-07 | End: 2017-12-11

## 2017-12-07 RX ORDER — FAMOTIDINE 10 MG/ML
20 INJECTION INTRAVENOUS EVERY 12 HOURS
Qty: 0 | Refills: 0 | Status: DISCONTINUED | OUTPATIENT
Start: 2017-12-07 | End: 2017-12-11

## 2017-12-07 RX ORDER — ACETAMINOPHEN 500 MG
650 TABLET ORAL EVERY 6 HOURS
Qty: 0 | Refills: 0 | Status: DISCONTINUED | OUTPATIENT
Start: 2017-12-07 | End: 2017-12-11

## 2017-12-07 RX ORDER — HYDROMORPHONE HYDROCHLORIDE 2 MG/ML
30 INJECTION INTRAMUSCULAR; INTRAVENOUS; SUBCUTANEOUS
Qty: 0 | Refills: 0 | Status: DISCONTINUED | OUTPATIENT
Start: 2017-12-07 | End: 2017-12-08

## 2017-12-07 RX ORDER — DOCUSATE SODIUM 100 MG
100 CAPSULE ORAL
Qty: 0 | Refills: 0 | Status: DISCONTINUED | OUTPATIENT
Start: 2017-12-07 | End: 2017-12-11

## 2017-12-07 RX ADMIN — Medication 100 MILLIGRAM(S): at 23:14

## 2017-12-07 RX ADMIN — HYDROMORPHONE HYDROCHLORIDE 1 MILLIGRAM(S): 2 INJECTION INTRAMUSCULAR; INTRAVENOUS; SUBCUTANEOUS at 23:05

## 2017-12-07 RX ADMIN — HYDROMORPHONE HYDROCHLORIDE 1 MILLIGRAM(S): 2 INJECTION INTRAMUSCULAR; INTRAVENOUS; SUBCUTANEOUS at 23:19

## 2017-12-07 RX ADMIN — SODIUM CHLORIDE 30 MILLILITER(S): 9 INJECTION, SOLUTION INTRAVENOUS at 13:44

## 2017-12-07 RX ADMIN — HYDROMORPHONE HYDROCHLORIDE 30 MILLILITER(S): 2 INJECTION INTRAMUSCULAR; INTRAVENOUS; SUBCUTANEOUS at 23:53

## 2017-12-07 RX ADMIN — SODIUM CHLORIDE 3 MILLILITER(S): 9 INJECTION INTRAMUSCULAR; INTRAVENOUS; SUBCUTANEOUS at 22:52

## 2017-12-07 RX ADMIN — HYDROMORPHONE HYDROCHLORIDE 1 MILLIGRAM(S): 2 INJECTION INTRAMUSCULAR; INTRAVENOUS; SUBCUTANEOUS at 22:45

## 2017-12-07 RX ADMIN — HYDROMORPHONE HYDROCHLORIDE 1 MILLIGRAM(S): 2 INJECTION INTRAMUSCULAR; INTRAVENOUS; SUBCUTANEOUS at 22:30

## 2017-12-07 RX ADMIN — HYDROMORPHONE HYDROCHLORIDE 1 MILLIGRAM(S): 2 INJECTION INTRAMUSCULAR; INTRAVENOUS; SUBCUTANEOUS at 22:51

## 2017-12-07 RX ADMIN — SODIUM CHLORIDE 150 MILLILITER(S): 9 INJECTION, SOLUTION INTRAVENOUS at 23:20

## 2017-12-07 NOTE — CHART NOTE - NSCHARTNOTEFT_GEN_A_CORE
Ortho Post-Op Check    Pt seen and examined. C/o dry throat. Pain controlled.    Vital Signs Last 24 Hrs  T(C): 36.3 (07 Dec 2017 21:55), Max: 36.7 (07 Dec 2017 13:10)  T(F): 97.3 (07 Dec 2017 21:55), Max: 98 (07 Dec 2017 13:10)  HR: 74 (07 Dec 2017 23:15) (63 - 83)  BP: 123/73 (07 Dec 2017 23:15) (102/67 - 137/73)  BP(mean): --  RR: 13 (07 Dec 2017 23:15) (12 - 166)  SpO2: 100% (07 Dec 2017 23:15) (97% - 100%)    NAD  In [place] collar  HV in place, S/S output  5/5 RUE delt/bi/tri/WF/WE/intrinsics  4/5 L intrinsics, 4+/5 delt/bi/tri/WF/WE  SILT C5-T1 bilat  WWP brisk cap refill  5/5 b/l LE and SILT L2-S1    62M s/p C3-7 lami/fusion  - WBAT  - Aspen collar  - Pain control  - PT/OOB  - Venodynes for DVT ppx  - FU transfer to floor

## 2017-12-07 NOTE — BRIEF OPERATIVE NOTE - PROCEDURE
Cervical spinal fusion  12/07/2017    Active  BRAYAN  Cervical laminectomy at three or more levels by posterior approach  12/07/2017    Active  BRAYAN <<-----Click on this checkbox to enter Procedure

## 2017-12-07 NOTE — ASU PREOP CHECKLIST - COMMENTS
patient took atenolol,losartan, pepcid with sip of water this 9am,. patient took atenolol, losartan, Pepcid with sip of water this 9am,.

## 2017-12-08 ENCOUNTER — TRANSCRIPTION ENCOUNTER (OUTPATIENT)
Age: 62
End: 2017-12-08

## 2017-12-08 DIAGNOSIS — Z98.1 ARTHRODESIS STATUS: ICD-10-CM

## 2017-12-08 DIAGNOSIS — D72.829 ELEVATED WHITE BLOOD CELL COUNT, UNSPECIFIED: ICD-10-CM

## 2017-12-08 DIAGNOSIS — M48.02 SPINAL STENOSIS, CERVICAL REGION: ICD-10-CM

## 2017-12-08 DIAGNOSIS — N18.4 CHRONIC KIDNEY DISEASE, STAGE 4 (SEVERE): ICD-10-CM

## 2017-12-08 DIAGNOSIS — D50.0 IRON DEFICIENCY ANEMIA SECONDARY TO BLOOD LOSS (CHRONIC): ICD-10-CM

## 2017-12-08 LAB
BASOPHILS # BLD AUTO: 0.01 K/UL — SIGNIFICANT CHANGE UP (ref 0–0.2)
BASOPHILS # BLD AUTO: 0.01 K/UL — SIGNIFICANT CHANGE UP (ref 0–0.2)
BASOPHILS NFR BLD AUTO: 0.1 % — SIGNIFICANT CHANGE UP (ref 0–2)
BASOPHILS NFR BLD AUTO: 0.1 % — SIGNIFICANT CHANGE UP (ref 0–2)
BUN SERPL-MCNC: 19 MG/DL — SIGNIFICANT CHANGE UP (ref 7–23)
BUN SERPL-MCNC: 20 MG/DL — SIGNIFICANT CHANGE UP (ref 7–23)
CALCIUM SERPL-MCNC: 8.5 MG/DL — SIGNIFICANT CHANGE UP (ref 8.4–10.5)
CALCIUM SERPL-MCNC: 8.5 MG/DL — SIGNIFICANT CHANGE UP (ref 8.4–10.5)
CHLORIDE SERPL-SCNC: 100 MMOL/L — SIGNIFICANT CHANGE UP (ref 98–107)
CHLORIDE SERPL-SCNC: 102 MMOL/L — SIGNIFICANT CHANGE UP (ref 98–107)
CO2 SERPL-SCNC: 22 MMOL/L — SIGNIFICANT CHANGE UP (ref 22–31)
CO2 SERPL-SCNC: 26 MMOL/L — SIGNIFICANT CHANGE UP (ref 22–31)
CREAT SERPL-MCNC: 1.51 MG/DL — HIGH (ref 0.5–1.3)
CREAT SERPL-MCNC: 1.54 MG/DL — HIGH (ref 0.5–1.3)
EOSINOPHIL # BLD AUTO: 0 K/UL — SIGNIFICANT CHANGE UP (ref 0–0.5)
EOSINOPHIL # BLD AUTO: 0.02 K/UL — SIGNIFICANT CHANGE UP (ref 0–0.5)
EOSINOPHIL NFR BLD AUTO: 0 % — SIGNIFICANT CHANGE UP (ref 0–6)
EOSINOPHIL NFR BLD AUTO: 0.2 % — SIGNIFICANT CHANGE UP (ref 0–6)
GLUCOSE SERPL-MCNC: 128 MG/DL — HIGH (ref 70–99)
GLUCOSE SERPL-MCNC: 150 MG/DL — HIGH (ref 70–99)
HCT VFR BLD CALC: 33.9 % — LOW (ref 39–50)
HCT VFR BLD CALC: 34.1 % — LOW (ref 39–50)
HGB BLD-MCNC: 10.7 G/DL — LOW (ref 13–17)
HGB BLD-MCNC: 10.9 G/DL — LOW (ref 13–17)
IMM GRANULOCYTES # BLD AUTO: 0.05 # — SIGNIFICANT CHANGE UP
IMM GRANULOCYTES # BLD AUTO: 0.07 # — SIGNIFICANT CHANGE UP
IMM GRANULOCYTES NFR BLD AUTO: 0.5 % — SIGNIFICANT CHANGE UP (ref 0–1.5)
IMM GRANULOCYTES NFR BLD AUTO: 0.5 % — SIGNIFICANT CHANGE UP (ref 0–1.5)
LYMPHOCYTES # BLD AUTO: 0.77 K/UL — LOW (ref 1–3.3)
LYMPHOCYTES # BLD AUTO: 0.84 K/UL — LOW (ref 1–3.3)
LYMPHOCYTES # BLD AUTO: 6.5 % — LOW (ref 13–44)
LYMPHOCYTES # BLD AUTO: 7.3 % — LOW (ref 13–44)
MCHC RBC-ENTMCNC: 26.2 PG — LOW (ref 27–34)
MCHC RBC-ENTMCNC: 26.3 PG — LOW (ref 27–34)
MCHC RBC-ENTMCNC: 31.6 % — LOW (ref 32–36)
MCHC RBC-ENTMCNC: 32 % — SIGNIFICANT CHANGE UP (ref 32–36)
MCV RBC AUTO: 82.2 FL — SIGNIFICANT CHANGE UP (ref 80–100)
MCV RBC AUTO: 82.9 FL — SIGNIFICANT CHANGE UP (ref 80–100)
MONOCYTES # BLD AUTO: 0.2 K/UL — SIGNIFICANT CHANGE UP (ref 0–0.9)
MONOCYTES # BLD AUTO: 0.46 K/UL — SIGNIFICANT CHANGE UP (ref 0–0.9)
MONOCYTES NFR BLD AUTO: 1.9 % — LOW (ref 2–14)
MONOCYTES NFR BLD AUTO: 3.6 % — SIGNIFICANT CHANGE UP (ref 2–14)
NEUTROPHILS # BLD AUTO: 11.52 K/UL — HIGH (ref 1.8–7.4)
NEUTROPHILS # BLD AUTO: 9.47 K/UL — HIGH (ref 1.8–7.4)
NEUTROPHILS NFR BLD AUTO: 89.3 % — HIGH (ref 43–77)
NEUTROPHILS NFR BLD AUTO: 90 % — HIGH (ref 43–77)
NRBC # FLD: 0 — SIGNIFICANT CHANGE UP
NRBC # FLD: 0 — SIGNIFICANT CHANGE UP
PLATELET # BLD AUTO: 263 K/UL — SIGNIFICANT CHANGE UP (ref 150–400)
PLATELET # BLD AUTO: 271 K/UL — SIGNIFICANT CHANGE UP (ref 150–400)
PMV BLD: 10.6 FL — SIGNIFICANT CHANGE UP (ref 7–13)
PMV BLD: 10.9 FL — SIGNIFICANT CHANGE UP (ref 7–13)
POTASSIUM SERPL-MCNC: 4.1 MMOL/L — SIGNIFICANT CHANGE UP (ref 3.5–5.3)
POTASSIUM SERPL-MCNC: 4.6 MMOL/L — SIGNIFICANT CHANGE UP (ref 3.5–5.3)
POTASSIUM SERPL-SCNC: 4.1 MMOL/L — SIGNIFICANT CHANGE UP (ref 3.5–5.3)
POTASSIUM SERPL-SCNC: 4.6 MMOL/L — SIGNIFICANT CHANGE UP (ref 3.5–5.3)
RBC # BLD: 4.09 M/UL — LOW (ref 4.2–5.8)
RBC # BLD: 4.15 M/UL — LOW (ref 4.2–5.8)
RBC # FLD: 14.2 % — SIGNIFICANT CHANGE UP (ref 10.3–14.5)
RBC # FLD: 14.3 % — SIGNIFICANT CHANGE UP (ref 10.3–14.5)
SODIUM SERPL-SCNC: 137 MMOL/L — SIGNIFICANT CHANGE UP (ref 135–145)
SODIUM SERPL-SCNC: 139 MMOL/L — SIGNIFICANT CHANGE UP (ref 135–145)
WBC # BLD: 10.52 K/UL — HIGH (ref 3.8–10.5)
WBC # BLD: 12.9 K/UL — HIGH (ref 3.8–10.5)
WBC # FLD AUTO: 10.52 K/UL — HIGH (ref 3.8–10.5)
WBC # FLD AUTO: 12.9 K/UL — HIGH (ref 3.8–10.5)

## 2017-12-08 PROCEDURE — 99223 1ST HOSP IP/OBS HIGH 75: CPT

## 2017-12-08 RX ORDER — HYDROMORPHONE HYDROCHLORIDE 2 MG/ML
1 INJECTION INTRAMUSCULAR; INTRAVENOUS; SUBCUTANEOUS
Qty: 0 | Refills: 0 | Status: DISCONTINUED | OUTPATIENT
Start: 2017-12-08 | End: 2017-12-11

## 2017-12-08 RX ORDER — DICLOFENAC SODIUM 75 MG/1
1 TABLET, DELAYED RELEASE ORAL
Qty: 0 | Refills: 0 | COMMUNITY

## 2017-12-08 RX ORDER — OXYCODONE HYDROCHLORIDE 5 MG/1
5 TABLET ORAL
Qty: 0 | Refills: 0 | Status: DISCONTINUED | OUTPATIENT
Start: 2017-12-08 | End: 2017-12-11

## 2017-12-08 RX ORDER — SENNA PLUS 8.6 MG/1
2 TABLET ORAL
Qty: 10 | Refills: 0 | OUTPATIENT
Start: 2017-12-08 | End: 2017-12-13

## 2017-12-08 RX ORDER — OXYCODONE HYDROCHLORIDE 5 MG/1
10 TABLET ORAL
Qty: 0 | Refills: 0 | Status: DISCONTINUED | OUTPATIENT
Start: 2017-12-08 | End: 2017-12-11

## 2017-12-08 RX ORDER — OXYCODONE HYDROCHLORIDE 5 MG/1
10 TABLET ORAL
Qty: 0 | Refills: 0 | Status: DISCONTINUED | OUTPATIENT
Start: 2017-12-08 | End: 2017-12-08

## 2017-12-08 RX ORDER — ACETAMINOPHEN 500 MG
650 TABLET ORAL EVERY 6 HOURS
Qty: 0 | Refills: 0 | Status: DISCONTINUED | OUTPATIENT
Start: 2017-12-08 | End: 2017-12-11

## 2017-12-08 RX ORDER — ASPIRIN/CALCIUM CARB/MAGNESIUM 324 MG
1 TABLET ORAL
Qty: 0 | Refills: 0 | COMMUNITY

## 2017-12-08 RX ORDER — OXYCODONE HYDROCHLORIDE 5 MG/1
1 TABLET ORAL
Qty: 60 | Refills: 0 | OUTPATIENT
Start: 2017-12-08 | End: 2017-12-15

## 2017-12-08 RX ORDER — OXYBUTYNIN CHLORIDE 5 MG
5 TABLET ORAL
Qty: 0 | Refills: 0 | Status: DISCONTINUED | OUTPATIENT
Start: 2017-12-08 | End: 2017-12-11

## 2017-12-08 RX ORDER — OXYBUTYNIN CHLORIDE 5 MG
5 TABLET ORAL DAILY
Qty: 0 | Refills: 0 | Status: DISCONTINUED | OUTPATIENT
Start: 2017-12-08 | End: 2017-12-08

## 2017-12-08 RX ADMIN — Medication 100 MILLIGRAM(S): at 07:19

## 2017-12-08 RX ADMIN — GABAPENTIN 800 MILLIGRAM(S): 400 CAPSULE ORAL at 07:10

## 2017-12-08 RX ADMIN — SODIUM CHLORIDE 3 MILLILITER(S): 9 INJECTION INTRAMUSCULAR; INTRAVENOUS; SUBCUTANEOUS at 14:00

## 2017-12-08 RX ADMIN — TAMSULOSIN HYDROCHLORIDE 0.4 MILLIGRAM(S): 0.4 CAPSULE ORAL at 21:22

## 2017-12-08 RX ADMIN — Medication 10 MILLIGRAM(S): at 21:22

## 2017-12-08 RX ADMIN — Medication 100 MILLIGRAM(S): at 07:09

## 2017-12-08 RX ADMIN — HYDROMORPHONE HYDROCHLORIDE 30 MILLILITER(S): 2 INJECTION INTRAMUSCULAR; INTRAVENOUS; SUBCUTANEOUS at 02:29

## 2017-12-08 RX ADMIN — HYDROMORPHONE HYDROCHLORIDE 30 MILLILITER(S): 2 INJECTION INTRAMUSCULAR; INTRAVENOUS; SUBCUTANEOUS at 08:26

## 2017-12-08 RX ADMIN — Medication 5 MILLIGRAM(S): at 17:04

## 2017-12-08 RX ADMIN — Medication 10 MILLIGRAM(S): at 14:00

## 2017-12-08 RX ADMIN — Medication 1 TABLET(S): at 07:10

## 2017-12-08 RX ADMIN — SODIUM CHLORIDE 3 MILLILITER(S): 9 INJECTION INTRAMUSCULAR; INTRAVENOUS; SUBCUTANEOUS at 07:11

## 2017-12-08 RX ADMIN — OXYCODONE HYDROCHLORIDE 10 MILLIGRAM(S): 5 TABLET ORAL at 21:26

## 2017-12-08 RX ADMIN — Medication 5 MILLIGRAM(S): at 07:10

## 2017-12-08 RX ADMIN — Medication 20 MILLIGRAM(S): at 14:00

## 2017-12-08 RX ADMIN — LOSARTAN POTASSIUM 50 MILLIGRAM(S): 100 TABLET, FILM COATED ORAL at 07:19

## 2017-12-08 RX ADMIN — HYDROMORPHONE HYDROCHLORIDE 1 MILLIGRAM(S): 2 INJECTION INTRAMUSCULAR; INTRAVENOUS; SUBCUTANEOUS at 00:02

## 2017-12-08 RX ADMIN — Medication 650 MILLIGRAM(S): at 13:06

## 2017-12-08 RX ADMIN — OXYCODONE HYDROCHLORIDE 10 MILLIGRAM(S): 5 TABLET ORAL at 14:50

## 2017-12-08 RX ADMIN — OXYCODONE HYDROCHLORIDE 10 MILLIGRAM(S): 5 TABLET ORAL at 14:00

## 2017-12-08 RX ADMIN — OXYCODONE HYDROCHLORIDE 10 MILLIGRAM(S): 5 TABLET ORAL at 11:40

## 2017-12-08 RX ADMIN — OXYCODONE HYDROCHLORIDE 10 MILLIGRAM(S): 5 TABLET ORAL at 22:20

## 2017-12-08 RX ADMIN — Medication 10 MILLIGRAM(S): at 07:10

## 2017-12-08 RX ADMIN — Medication 650 MILLIGRAM(S): at 23:51

## 2017-12-08 RX ADMIN — GABAPENTIN 800 MILLIGRAM(S): 400 CAPSULE ORAL at 17:04

## 2017-12-08 RX ADMIN — Medication 145 MILLIGRAM(S): at 21:22

## 2017-12-08 RX ADMIN — ATENOLOL 100 MILLIGRAM(S): 25 TABLET ORAL at 07:10

## 2017-12-08 RX ADMIN — SODIUM CHLORIDE 3 MILLILITER(S): 9 INJECTION INTRAMUSCULAR; INTRAVENOUS; SUBCUTANEOUS at 21:22

## 2017-12-08 RX ADMIN — Medication 650 MILLIGRAM(S): at 17:04

## 2017-12-08 RX ADMIN — SENNA PLUS 2 TABLET(S): 8.6 TABLET ORAL at 21:22

## 2017-12-08 RX ADMIN — OXYCODONE HYDROCHLORIDE 10 MILLIGRAM(S): 5 TABLET ORAL at 10:48

## 2017-12-08 RX ADMIN — Medication 100 MILLIGRAM(S): at 17:04

## 2017-12-08 NOTE — PHYSICAL THERAPY INITIAL EVALUATION ADULT - RANGE OF MOTION EXAMINATION, REHAB EVAL
no ROM deficits were identified Alanna Ravi), Internal Medicine  [address] [place] [address]  Phone: [phone]  Fax: [phone]    [place] Rivera  [facility] [place] [address]  Phone: [phone]  Fax: (   )    -

## 2017-12-08 NOTE — PROGRESS NOTE ADULT - SUBJECTIVE AND OBJECTIVE BOX
Anesthesia Pain Management Service    SUBJECTIVE: Patient is doing well with IV PCA and no significant problems reported.    Pain Scale Score	At rest: _3__ 	With Activity: ___ 	[X ] Refer to charted pain scores    THERAPY:    [ ] IV PCA Morphine		[ ] 5 mg/mL	[ ] 1 mg/mL  [X ] IV PCA Hydromorphone	[ ] 5 mg/mL	[X ] 1 mg/mL  [ ] IV PCA Fentanyl		[ ] 50 micrograms/mL    Demand dose __0.2_ lockout __6_ (minutes) Continuous Rate _0__ Total: _4.1mg__   mg used (in past 24 hrs)      MEDICATIONS  (STANDING):  acetaminophen   Tablet. 650 milliGRAM(s) Oral every 6 hours  ATENolol  Tablet 100 milliGRAM(s) Oral daily  baclofen 10 milliGRAM(s) Oral three times a day  docusate sodium 100 milliGRAM(s) Oral two times a day  fenofibrate Tablet 145 milliGRAM(s) Oral daily  furosemide    Tablet 20 milliGRAM(s) Oral every other day  gabapentin 800 milliGRAM(s) Oral two times a day  lactated ringers. 1000 milliLiter(s) (150 mL/Hr) IV Continuous <Continuous>  losartan 50 milliGRAM(s) Oral daily  oxybutynin 5 milliGRAM(s) Oral two times a day  senna 2 Tablet(s) Oral at bedtime  sodium chloride 0.9% lock flush 3 milliLiter(s) IV Push every 8 hours  tamsulosin 0.4 milliGRAM(s) Oral at bedtime  triamterene 37.5 mG/hydrochlorothiazide 25 mG Tablet 1 Tablet(s) Oral daily    MEDICATIONS  (PRN):  acetaminophen   Tablet 650 milliGRAM(s) Oral every 6 hours PRN For Temp greater than 38 C (100.4 F)  aluminum hydroxide/magnesium hydroxide/simethicone Suspension 30 milliLiter(s) Oral every 12 hours PRN Indigestion  diphenhydrAMINE   Injectable 12.5 milliGRAM(s) IV Push every 4 hours PRN Itching and insomnia  diphenhydrAMINE   Injectable 25 milliGRAM(s) IV Push every 4 hours PRN Pruritus  famotidine    Tablet 20 milliGRAM(s) Oral every 12 hours PRN Dyspepsia  HYDROmorphone  Injectable 1 milliGRAM(s) IV Push every 10 minutes PRN Severe Pain (7 - 10)  HYDROmorphone  Injectable 1 milliGRAM(s) IV Push every 3 hours PRN Severe Pain (7 - 10)  magnesium hydroxide Suspension 30 milliLiter(s) Oral every 12 hours PRN Constipation  naloxone Injectable 0.1 milliGRAM(s) IV Push every 3 minutes PRN For ANY of the following changes in patient status:  A. RR LESS THAN 10 breaths per minute, B. Oxygen saturation LESS THAN 90%, C. Sedation score of 6  ondansetron Injectable 4 milliGRAM(s) IV Push every 6 hours PRN Nausea  oxyCODONE    IR 5 milliGRAM(s) Oral every 3 hours PRN Moderate Pain (4 - 6)  oxyCODONE    IR 10 milliGRAM(s) Oral every 3 hours PRN Severe Pain (7 - 10)      OBJECTIVE: pt. sitting up in bed    Sedation Score:	[ X] Alert	[ ] Drowsy 	[ ] Arousable	[ ] Asleep	[ ] Unresponsive    Side Effects:	[X ] None	[ ] Nausea	[ ] Vomiting	[ ] Pruritus  		[ ] Other:    Vital Signs Last 24 Hrs  T(C): 36.7 (08 Dec 2017 10:03), Max: 36.7 (07 Dec 2017 13:10)  T(F): 98 (08 Dec 2017 10:03), Max: 98.1 (08 Dec 2017 00:00)  HR: 74 (08 Dec 2017 10:03) (63 - 83)  BP: 128/73 (08 Dec 2017 10:03) (102/67 - 139/70)  BP(mean): --  RR: 18 (08 Dec 2017 10:03) (12 - 166)  SpO2: 99% (08 Dec 2017 10:03) (95% - 100%)    ASSESSMENT/ PLAN    Therapy to  be:	[ ] Continue   [ X] Discontinued   [X ] Change to prn Analgesics    Documentation and Verification of current medications:   [X] Done	[ ] Not done, not elligible    Comments: PRN Oral/IV opioids and/or Adjuvant medication to be ordered at this point.

## 2017-12-08 NOTE — DISCHARGE NOTE ADULT - INSTRUCTIONS
resume home diet Call MD for any c/o numbness, tingling, swelling to all extremities, fever and a return appointment.  Take over the counter stool softener to prevent constipation that can be a side effect from taking pain medications.

## 2017-12-08 NOTE — PROGRESS NOTE ADULT - SUBJECTIVE AND OBJECTIVE BOX
Anesthesia Pain Management Service    SUBJECTIVE: Pt now off IV PCA without problems reported.    Therapy:	  [ X] IV PCA	   [ ] Epidural           [ ] s/p Spinal Opoid              [ ] Postpartum infusion	  [ ] Patient controlled regional anesthesia (PCRA)    [ ] prn Analgesics    Allergies    No Known Drug Allergies  shellfish (Rash; Anaphylaxis)    Intolerances      MEDICATIONS  (STANDING):  acetaminophen   Tablet. 650 milliGRAM(s) Oral every 6 hours  ATENolol  Tablet 100 milliGRAM(s) Oral daily  baclofen 10 milliGRAM(s) Oral three times a day  docusate sodium 100 milliGRAM(s) Oral two times a day  fenofibrate Tablet 145 milliGRAM(s) Oral daily  furosemide    Tablet 20 milliGRAM(s) Oral every other day  gabapentin 800 milliGRAM(s) Oral two times a day  lactated ringers. 1000 milliLiter(s) (150 mL/Hr) IV Continuous <Continuous>  losartan 50 milliGRAM(s) Oral daily  oxybutynin 5 milliGRAM(s) Oral two times a day  senna 2 Tablet(s) Oral at bedtime  sodium chloride 0.9% lock flush 3 milliLiter(s) IV Push every 8 hours  tamsulosin 0.4 milliGRAM(s) Oral at bedtime  triamterene 37.5 mG/hydrochlorothiazide 25 mG Tablet 1 Tablet(s) Oral daily    MEDICATIONS  (PRN):  acetaminophen   Tablet 650 milliGRAM(s) Oral every 6 hours PRN For Temp greater than 38 C (100.4 F)  aluminum hydroxide/magnesium hydroxide/simethicone Suspension 30 milliLiter(s) Oral every 12 hours PRN Indigestion  diphenhydrAMINE   Injectable 12.5 milliGRAM(s) IV Push every 4 hours PRN Itching and insomnia  diphenhydrAMINE   Injectable 25 milliGRAM(s) IV Push every 4 hours PRN Pruritus  famotidine    Tablet 20 milliGRAM(s) Oral every 12 hours PRN Dyspepsia  HYDROmorphone  Injectable 1 milliGRAM(s) IV Push every 10 minutes PRN Severe Pain (7 - 10)  HYDROmorphone  Injectable 1 milliGRAM(s) IV Push every 3 hours PRN Severe Pain (7 - 10)  magnesium hydroxide Suspension 30 milliLiter(s) Oral every 12 hours PRN Constipation  naloxone Injectable 0.1 milliGRAM(s) IV Push every 3 minutes PRN For ANY of the following changes in patient status:  A. RR LESS THAN 10 breaths per minute, B. Oxygen saturation LESS THAN 90%, C. Sedation score of 6  ondansetron Injectable 4 milliGRAM(s) IV Push every 6 hours PRN Nausea  oxyCODONE    IR 5 milliGRAM(s) Oral every 3 hours PRN Moderate Pain (4 - 6)  oxyCODONE    IR 10 milliGRAM(s) Oral every 3 hours PRN Severe Pain (7 - 10)      OBJECTIVE:   [X] No new signs     [ ] Other:    Side Effects:  [X ] None			[ ] Other:    Assessment of Catheter Site:		[ ] Intact		[ ] Other:    ASSESSMENT/PLAN  [ ] Continue current therapy    [X ] Therapy changed to:    [ ] IV PCA       [ ] Epidural     [ X] prn Analgesics     Comments: Opioids or Adjuvent analgesics to be used at this point. Cont pulse ox.    Progress Note written now but Patient was seen earlier.

## 2017-12-08 NOTE — DISCHARGE NOTE ADULT - CONDITIONS AT DISCHARGE
Posterior neck dressing clean, dry and intact with cervical collar .  Toes warm and mobile.  +NVS, Tolerated po  and fluids.  +void.

## 2017-12-08 NOTE — PROGRESS NOTE ADULT - SUBJECTIVE AND OBJECTIVE BOX
ANESTHESIA POSTOP CHECK    62y Male POSTOP DAY 1 S/P     Vital Signs Last 24 Hrs  T(C): 36.7 (08 Dec 2017 13:59), Max: 36.7 (08 Dec 2017 00:00)  T(F): 98 (08 Dec 2017 13:59), Max: 98.1 (08 Dec 2017 00:00)  HR: 80 (08 Dec 2017 13:59) (67 - 83)  BP: 138/60 (08 Dec 2017 13:59) (110/67 - 139/70)  BP(mean): --  RR: 18 (08 Dec 2017 13:59) (12 - 166)  SpO2: 99% (08 Dec 2017 13:59) (95% - 100%)  I&O's Summary    07 Dec 2017 07:01  -  08 Dec 2017 07:00  --------------------------------------------------------  IN: 300 mL / OUT: 980 mL / NET: -680 mL    08 Dec 2017 07:01  -  08 Dec 2017 14:55  --------------------------------------------------------  IN: 0 mL / OUT: 690 mL / NET: -690 mL        [X ] NO APPARENT ANESTHESIA COMPLICATIONS      Comments:

## 2017-12-08 NOTE — DISCHARGE NOTE ADULT - ADDITIONAL INSTRUCTIONS
Patient is now s/p C3-C7 laminectomy and fusion on 12/7/17. Patient tolerated the procedure well without any intraoperative complications. Patient tolerated physical therapy well and pain is controlled. Seen by medical attending for continuity of care and management and cleared for safe discharge. As per surgeon patient stable and ready for discharge.     Please follow up with  on 1-2 weeks, please call [phone] for appointment time and date. Please follow up with your PMD for continuity of care and management as medications may have changed. Do not take any NSAIDS (motrin, advil, ibuprofren, aleve), Aspirin, Antiinflammatory medications unless instructed by your orthopaedic surgeon to continue. Avoid any heavy lifting, bending, squatting, twisting motion. Keep dressing/incision clean, dry, intact. Any suture/staples to be removed on post op day # 14 at office visit. Weight bear as tolerated. Patient is now s/p C3-C7 laminectomy and fusion on 12/7/17. Patient tolerated the procedure well without any intraoperative complications. Patient tolerated physical therapy well and pain is controlled. Seen by medical attending for continuity of care and management and cleared for safe discharge. As per surgeon patient stable and ready for discharge.     Please follow up with  on Monday 12/18 at 1:30pm. Please follow up with your PMD for continuity of care and management as medications may have changed. Do not take any NSAIDS (motrin, advil, ibuprofren, aleve), Aspirin, Antiinflammatory medications unless instructed by your orthopaedic surgeon to continue. Avoid any heavy lifting, bending, squatting, twisting motion. Keep dressing/incision clean, dry, intact. Any suture/staples to be removed on post op day # 14 at office visit. Weight bear as tolerated.

## 2017-12-08 NOTE — CONSULT NOTE ADULT - SUBJECTIVE AND OBJECTIVE BOX
Patient is a 62y old  Male who presents with a chief complaint of Patient is now s/p C3-C7 laminectomy and fusion on 12/7/17 (08 Dec 2017 10:52)      HPI:  63 y/o male with PMH of HTN, CHRISSIE not compliant with CPAP, HLD and Obesity presents to [facility] for preoperative evaluation with dx of spinal stenosis, cervical region. Pt reports increased pain of the cervical spine for 2 months. He reports weakness of his bilateral upper extremities with associated numbness and tingling. s/p Lumbar Laminectomy and Fusion January 2017. Scheduled for C3-6 Posterior Cervical Decompression and Fusion on 12/7/2017. (05 Dec 2017 11:48)      No F/C, N/V, CP, SOB, Cough, lightheadedness, dizziness, abdominal pain, diarrhea, dysuria.    Pain Symptoms if applicable:             	                         none	   mild         moderate         severe  Pain:	                            0	    1-3	     4-6	         7-10  Location:	Surgical site  Modifying factors:	Worse with movement  Associated symptoms:	    Allergies    No Known Drug Allergies  shellfish (Rash; Anaphylaxis)    Intolerances        HOME MEDICATIONS: Reviewed    MEDICATIONS  (STANDING):  acetaminophen   Tablet. 650 milliGRAM(s) Oral every 6 hours  ATENolol  Tablet 100 milliGRAM(s) Oral daily  baclofen 10 milliGRAM(s) Oral three times a day  docusate sodium 100 milliGRAM(s) Oral two times a day  fenofibrate Tablet 145 milliGRAM(s) Oral daily  furosemide    Tablet 20 milliGRAM(s) Oral every other day  gabapentin 800 milliGRAM(s) Oral two times a day  lactated ringers. 1000 milliLiter(s) (150 mL/Hr) IV Continuous <Continuous>  losartan 50 milliGRAM(s) Oral daily  oxybutynin 5 milliGRAM(s) Oral two times a day  senna 2 Tablet(s) Oral at bedtime  sodium chloride 0.9% lock flush 3 milliLiter(s) IV Push every 8 hours  tamsulosin 0.4 milliGRAM(s) Oral at bedtime  triamterene 37.5 mG/hydrochlorothiazide 25 mG Tablet 1 Tablet(s) Oral daily    MEDICATIONS  (PRN):  acetaminophen   Tablet 650 milliGRAM(s) Oral every 6 hours PRN For Temp greater than 38 C (100.4 F)  aluminum hydroxide/magnesium hydroxide/simethicone Suspension 30 milliLiter(s) Oral every 12 hours PRN Indigestion  diphenhydrAMINE   Injectable 12.5 milliGRAM(s) IV Push every 4 hours PRN Itching and insomnia  diphenhydrAMINE   Injectable 25 milliGRAM(s) IV Push every 4 hours PRN Pruritus  famotidine    Tablet 20 milliGRAM(s) Oral every 12 hours PRN Dyspepsia  HYDROmorphone  Injectable 1 milliGRAM(s) IV Push every 10 minutes PRN Severe Pain (7 - 10)  HYDROmorphone  Injectable 1 milliGRAM(s) IV Push every 3 hours PRN Severe Pain (7 - 10)  magnesium hydroxide Suspension 30 milliLiter(s) Oral every 12 hours PRN Constipation  naloxone Injectable 0.1 milliGRAM(s) IV Push every 3 minutes PRN For ANY of the following changes in patient status:  A. RR LESS THAN 10 breaths per minute, B. Oxygen saturation LESS THAN 90%, C. Sedation score of 6  ondansetron Injectable 4 milliGRAM(s) IV Push every 6 hours PRN Nausea  oxyCODONE    IR 5 milliGRAM(s) Oral every 3 hours PRN Moderate Pain (4 - 6)  oxyCODONE    IR 10 milliGRAM(s) Oral every 3 hours PRN Severe Pain (7 - 10)      PAST MEDICAL & SURGICAL HISTORY:  BPH (benign prostatic hyperplasia)  OA (osteoarthritis of the spine): of right hip  Spinal stenosis in cervical region  Other intervertebral disc displacement, lumbar region  Spinal stenosis, lumbosacral region  Obesity (BMI 30.0-34.9)  HTN (hypertension)  CHRISSIE on CPAP  Back pain: chronic  H/O laminectomy: and fusion in Jan 2017  History of hip surgery: s/p right hip replacement      SOCIAL HISTORY:  No tobacco/alcohol use/abuse.    FAMILY HISTORY:  Family history of hypertension in mother (Mother)  Family history of Alzheimer's disease (Father)      REVIEW OF SYSTEMS:    CONSTITUTIONAL: No fever, weight loss, or fatigue  EYES: No eye pain, visual disturbances, or discharge  ENMT:  No difficulty hearing, tinnitus, vertigo; No sinus or throat pain  NECK: No pain or stiffness  BREASTS: No pain, masses, or nipple discharge  RESPIRATORY: No cough, wheezing, chills or hemoptysis; No shortness of breath  CARDIOVASCULAR: No chest pain, palpitations, dizziness, or leg swelling  GASTROINTESTINAL: No abdominal or epigastric pain. No nausea, vomiting, or hematemesis; No diarrhea or constipation. No melena or hematochezia.  GENITOURINARY: No dysuria, frequency, hematuria, or incontinence  NEUROLOGICAL: No headaches, memory loss, loss of strength, numbness, or tremors  SKIN: No itching, burning, rashes, or lesions   LYMPH NODES: No enlarged glands  ENDOCRINE: No heat or cold intolerance; No hair loss  MUSCULOSKELETAL: No muscle or back pain  PSYCHIATRIC: No depression, anxiety, mood swings, or difficulty sleeping  HEME/LYMPH: No easy bruising, or bleeding gums  ALLERGY AND IMMUNOLOGIC: No hives or eczema    [] Unable to obtain due to poor mental status    Vital Signs Last 24 Hrs  T(C): 36.7 (08 Dec 2017 10:03), Max: 36.7 (07 Dec 2017 13:10)  T(F): 98 (08 Dec 2017 10:03), Max: 98.1 (08 Dec 2017 00:00)  HR: 74 (08 Dec 2017 10:03) (63 - 83)  BP: 128/73 (08 Dec 2017 10:03) (102/67 - 139/70)  BP(mean): --  RR: 18 (08 Dec 2017 10:03) (12 - 166)  SpO2: 99% (08 Dec 2017 10:03) (95% - 100%)  CAPILLARY BLOOD GLUCOSE          PHYSICAL EXAM:    GENERAL: NAD, well-groomed, well-developed  HEAD:  Atraumatic, Normocephalic  EYES: EOMI, PERRLA, conjunctiva and sclera clear  ENMT: Moist mucous membranes  NECK: Supple, No JVD  RESPIRATORY: Clear to auscultation bilaterally; No rales, rhonchi, wheezing, or rubs  CARDIOVASCULAR: Regular rate and rhythm; No murmurs, rubs, or gallops  GASTROINTESTINAL: Soft, Nontender, Nondistended; Bowel sounds present  BACK: No tenderness  GENITOURINARY: Not examined  EXTREMITIES:  2+ Peripheral Pulses, No clubbing, cyanosis, or edema  NERVOUS SYSTEM:  Alert & Oriented X3; Moving all 4 extremities; No gross sensory deficits  PSYCH: Calm  HEME/LYMPH: No lymphadenopathy noted  SKIN: No rashes or lesions; Incisions C/D/I    LABS:                        10.7   12.90 )-----------( 271      ( 08 Dec 2017 06:00 )             33.9     12-08    137  |  100  |  19  ----------------------------<  128<H>  4.6   |  26  |  1.51<H>    Ca    8.5      08 Dec 2017 06:00          CAPILLARY BLOOD GLUCOSE          RADIOLOGY & ADDITIONAL STUDIES:    Imaging:   Personally Reviewed:  [ ] YES               EKG:   Personally Reviewed:  [ ] YES       Care Discussed with Consultant(s)/Other Providers:  Care Discussed with Primary Team.      [] Increased delirium risk  [] Delirium and other risks can be reduced by:          -early ambulation          -minimizing "tethers" - IV, oxygen, catheters, etc          -avoiding hypnotics and sedatives          -maintaining hydration/nutrition          -avoid anticholinergics - diphenhydramine, etc          -pain control          -supportive environment Patient is a 62y old  Male who presents with a chief complaint of Patient is now s/p C3-C7 laminectomy and fusion on 12/7/17 (08 Dec 2017 10:52)      HPI:  63 y/o male with PMH of HTN, CHRISSIE not compliant with CPAP, HLD and Obesity presents to [facility] for preoperative evaluation with dx of spinal stenosis, cervical region. Pt reports increased pain of the cervical spine for 2 months. He reports weakness of his bilateral upper extremities with associated numbness and tingling. s/p Lumbar Laminectomy and Fusion January 2017. Scheduled for C3-6 Posterior Cervical Decompression and Fusion on 12/7/2017. (05 Dec 2017 11:48) Patient tolerated the procedure well.  Pain is rated at 9/10 in severity, achy in nature, located on the surgical site,       No F/C, N/V, CP, SOB, Cough, lightheadedness, dizziness, abdominal pain, diarrhea, dysuria.    Pain Symptoms if applicable:             	                         none	   mild         moderate         severe  Pain:	                            0	    1-3	     4-6	         7-10  Location:	Surgical site  Modifying factors:	Worse with movement  Associated symptoms:	    Allergies    No Known Drug Allergies  shellfish (Rash; Anaphylaxis)    Intolerances        HOME MEDICATIONS: Reviewed    MEDICATIONS  (STANDING):  acetaminophen   Tablet. 650 milliGRAM(s) Oral every 6 hours  ATENolol  Tablet 100 milliGRAM(s) Oral daily  baclofen 10 milliGRAM(s) Oral three times a day  docusate sodium 100 milliGRAM(s) Oral two times a day  fenofibrate Tablet 145 milliGRAM(s) Oral daily  furosemide    Tablet 20 milliGRAM(s) Oral every other day  gabapentin 800 milliGRAM(s) Oral two times a day  lactated ringers. 1000 milliLiter(s) (150 mL/Hr) IV Continuous <Continuous>  losartan 50 milliGRAM(s) Oral daily  oxybutynin 5 milliGRAM(s) Oral two times a day  senna 2 Tablet(s) Oral at bedtime  sodium chloride 0.9% lock flush 3 milliLiter(s) IV Push every 8 hours  tamsulosin 0.4 milliGRAM(s) Oral at bedtime  triamterene 37.5 mG/hydrochlorothiazide 25 mG Tablet 1 Tablet(s) Oral daily    MEDICATIONS  (PRN):  acetaminophen   Tablet 650 milliGRAM(s) Oral every 6 hours PRN For Temp greater than 38 C (100.4 F)  aluminum hydroxide/magnesium hydroxide/simethicone Suspension 30 milliLiter(s) Oral every 12 hours PRN Indigestion  diphenhydrAMINE   Injectable 12.5 milliGRAM(s) IV Push every 4 hours PRN Itching and insomnia  diphenhydrAMINE   Injectable 25 milliGRAM(s) IV Push every 4 hours PRN Pruritus  famotidine    Tablet 20 milliGRAM(s) Oral every 12 hours PRN Dyspepsia  HYDROmorphone  Injectable 1 milliGRAM(s) IV Push every 10 minutes PRN Severe Pain (7 - 10)  HYDROmorphone  Injectable 1 milliGRAM(s) IV Push every 3 hours PRN Severe Pain (7 - 10)  magnesium hydroxide Suspension 30 milliLiter(s) Oral every 12 hours PRN Constipation  naloxone Injectable 0.1 milliGRAM(s) IV Push every 3 minutes PRN For ANY of the following changes in patient status:  A. RR LESS THAN 10 breaths per minute, B. Oxygen saturation LESS THAN 90%, C. Sedation score of 6  ondansetron Injectable 4 milliGRAM(s) IV Push every 6 hours PRN Nausea  oxyCODONE    IR 5 milliGRAM(s) Oral every 3 hours PRN Moderate Pain (4 - 6)  oxyCODONE    IR 10 milliGRAM(s) Oral every 3 hours PRN Severe Pain (7 - 10)      PAST MEDICAL & SURGICAL HISTORY:  BPH (benign prostatic hyperplasia)  OA (osteoarthritis of the spine): of right hip  Spinal stenosis in cervical region  Other intervertebral disc displacement, lumbar region  Spinal stenosis, lumbosacral region  Obesity (BMI 30.0-34.9)  HTN (hypertension)  CHRISSIE on CPAP  Back pain: chronic  H/O laminectomy: and fusion in Jan 2017  History of hip surgery: s/p right hip replacement      SOCIAL HISTORY:  No tobacco/alcohol use/abuse.    FAMILY HISTORY:  Family history of hypertension in mother (Mother)  Family history of Alzheimer's disease (Father)      REVIEW OF SYSTEMS:    CONSTITUTIONAL: No fever, weight loss, or fatigue  EYES: No eye pain, visual disturbances, or discharge  ENMT:  No difficulty hearing, tinnitus, vertigo; No sinus or throat pain  NECK: No pain or stiffness  BREASTS: No pain, masses, or nipple discharge  RESPIRATORY: No cough, wheezing, chills or hemoptysis; No shortness of breath  CARDIOVASCULAR: No chest pain, palpitations, dizziness, or leg swelling  GASTROINTESTINAL: No abdominal or epigastric pain. No nausea, vomiting, or hematemesis; No diarrhea or constipation. No melena or hematochezia.  GENITOURINARY: No dysuria, frequency, hematuria, or incontinence  NEUROLOGICAL: No headaches, memory loss, loss of strength, numbness, or tremors  SKIN: No itching, burning, rashes, or lesions   LYMPH NODES: No enlarged glands  ENDOCRINE: No heat or cold intolerance; No hair loss  MUSCULOSKELETAL: No muscle or back pain  PSYCHIATRIC: No depression, anxiety, mood swings, or difficulty sleeping  HEME/LYMPH: No easy bruising, or bleeding gums  ALLERGY AND IMMUNOLOGIC: No hives or eczema    [] Unable to obtain due to poor mental status    Vital Signs Last 24 Hrs  T(C): 36.7 (08 Dec 2017 10:03), Max: 36.7 (07 Dec 2017 13:10)  T(F): 98 (08 Dec 2017 10:03), Max: 98.1 (08 Dec 2017 00:00)  HR: 74 (08 Dec 2017 10:03) (63 - 83)  BP: 128/73 (08 Dec 2017 10:03) (102/67 - 139/70)  BP(mean): --  RR: 18 (08 Dec 2017 10:03) (12 - 166)  SpO2: 99% (08 Dec 2017 10:03) (95% - 100%)  CAPILLARY BLOOD GLUCOSE          PHYSICAL EXAM:    GENERAL: NAD, well-groomed, well-developed  HEAD:  Atraumatic, Normocephalic  EYES: EOMI, PERRLA, conjunctiva and sclera clear  ENMT: Moist mucous membranes  NECK: Supple, No JVD  RESPIRATORY: Clear to auscultation bilaterally; No rales, rhonchi, wheezing, or rubs  CARDIOVASCULAR: Regular rate and rhythm; No murmurs, rubs, or gallops  GASTROINTESTINAL: Soft, Nontender, Nondistended; Bowel sounds present  BACK: No tenderness  GENITOURINARY: Not examined  EXTREMITIES:  2+ Peripheral Pulses, No clubbing, cyanosis, or edema  NERVOUS SYSTEM:  Alert & Oriented X3; Moving all 4 extremities; No gross sensory deficits  PSYCH: Calm  HEME/LYMPH: No lymphadenopathy noted  SKIN: No rashes or lesions; Incisions C/D/I    LABS:                        10.7   12.90 )-----------( 271      ( 08 Dec 2017 06:00 )             33.9     12-08    137  |  100  |  19  ----------------------------<  128<H>  4.6   |  26  |  1.51<H>    Ca    8.5      08 Dec 2017 06:00          CAPILLARY BLOOD GLUCOSE          RADIOLOGY & ADDITIONAL STUDIES:    Imaging:   Personally Reviewed:  [ ] YES               EKG:   Personally Reviewed:  [ ] YES       Care Discussed with Consultant(s)/Other Providers:  Care Discussed with Primary Team.      [] Increased delirium risk  [] Delirium and other risks can be reduced by:          -early ambulation          -minimizing "tethers" - IV, oxygen, catheters, etc          -avoiding hypnotics and sedatives          -maintaining hydration/nutrition          -avoid anticholinergics - diphenhydramine, etc          -pain control          -supportive environment Patient is a 62y old  Male who presents with a chief complaint of Patient is now s/p C3-C7 laminectomy and fusion on 12/7/17 (08 Dec 2017 10:52)      HPI:  63 y/o male with PMH of HTN, CHRISSIE not compliant with CPAP, HLD and Obesity presents to [facility] for preoperative evaluation with dx of spinal stenosis, cervical region. Pt reports increased pain of the cervical spine for 2 months. He reports weakness of his bilateral upper extremities with associated numbness and tingling. s/p Lumbar Laminectomy and Fusion January 2017. Scheduled for C3-6 Posterior Cervical Decompression and Fusion on 12/7/2017. (05 Dec 2017 11:48) Patient tolerated the procedure well.  Pain is rated at 9/10 in severity, achy in nature, located on the surgical site, non-radiating, worse with movement and improved with PCA pump.  No F/C, N/V, CP, SOB, Cough, lightheadedness, dizziness, abdominal pain, diarrhea, dysuria.    Pain Symptoms if applicable:             	                         none	   mild         moderate         severe  Pain:	           9                 0	    1-3	     4-6	         7-10  Location:	Surgical site  Modifying factors:	Worse with movement  Associated symptoms:	    Allergies    No Known Drug Allergies  shellfish (Rash; Anaphylaxis)    Intolerances        HOME MEDICATIONS: Reviewed    MEDICATIONS  (STANDING):  acetaminophen   Tablet. 650 milliGRAM(s) Oral every 6 hours  ATENolol  Tablet 100 milliGRAM(s) Oral daily  baclofen 10 milliGRAM(s) Oral three times a day  docusate sodium 100 milliGRAM(s) Oral two times a day  fenofibrate Tablet 145 milliGRAM(s) Oral daily  furosemide    Tablet 20 milliGRAM(s) Oral every other day  gabapentin 800 milliGRAM(s) Oral two times a day  lactated ringers. 1000 milliLiter(s) (150 mL/Hr) IV Continuous <Continuous>  losartan 50 milliGRAM(s) Oral daily  oxybutynin 5 milliGRAM(s) Oral two times a day  senna 2 Tablet(s) Oral at bedtime  sodium chloride 0.9% lock flush 3 milliLiter(s) IV Push every 8 hours  tamsulosin 0.4 milliGRAM(s) Oral at bedtime  triamterene 37.5 mG/hydrochlorothiazide 25 mG Tablet 1 Tablet(s) Oral daily    MEDICATIONS  (PRN):  acetaminophen   Tablet 650 milliGRAM(s) Oral every 6 hours PRN For Temp greater than 38 C (100.4 F)  aluminum hydroxide/magnesium hydroxide/simethicone Suspension 30 milliLiter(s) Oral every 12 hours PRN Indigestion  diphenhydrAMINE   Injectable 12.5 milliGRAM(s) IV Push every 4 hours PRN Itching and insomnia  diphenhydrAMINE   Injectable 25 milliGRAM(s) IV Push every 4 hours PRN Pruritus  famotidine    Tablet 20 milliGRAM(s) Oral every 12 hours PRN Dyspepsia  HYDROmorphone  Injectable 1 milliGRAM(s) IV Push every 10 minutes PRN Severe Pain (7 - 10)  HYDROmorphone  Injectable 1 milliGRAM(s) IV Push every 3 hours PRN Severe Pain (7 - 10)  magnesium hydroxide Suspension 30 milliLiter(s) Oral every 12 hours PRN Constipation  naloxone Injectable 0.1 milliGRAM(s) IV Push every 3 minutes PRN For ANY of the following changes in patient status:  A. RR LESS THAN 10 breaths per minute, B. Oxygen saturation LESS THAN 90%, C. Sedation score of 6  ondansetron Injectable 4 milliGRAM(s) IV Push every 6 hours PRN Nausea  oxyCODONE    IR 5 milliGRAM(s) Oral every 3 hours PRN Moderate Pain (4 - 6)  oxyCODONE    IR 10 milliGRAM(s) Oral every 3 hours PRN Severe Pain (7 - 10)      PAST MEDICAL & SURGICAL HISTORY:  BPH (benign prostatic hyperplasia)  OA (osteoarthritis of the spine): of right hip  Spinal stenosis in cervical region  Other intervertebral disc displacement, lumbar region  Spinal stenosis, lumbosacral region  Obesity (BMI 30.0-34.9)  HTN (hypertension)  CHRISSIE on CPAP  Back pain: chronic  H/O laminectomy: and fusion in Jan 2017  History of hip surgery: s/p right hip replacement      SOCIAL HISTORY:  No tobacco/alcohol use/abuse.    FAMILY HISTORY:  Family history of hypertension in mother (Mother)  Family history of Alzheimer's disease (Father)      REVIEW OF SYSTEMS:    CONSTITUTIONAL: No fever, weight loss, or fatigue  EYES: No eye pain, visual disturbances, or discharge  ENMT:  No difficulty hearing, tinnitus, vertigo; No sinus or throat pain  NECK: Significant neck pain.  BREASTS: No pain, masses, or nipple discharge  RESPIRATORY: No cough, wheezing, chills or hemoptysis; No shortness of breath  CARDIOVASCULAR: No chest pain, palpitations, dizziness, or leg swelling  GASTROINTESTINAL: No abdominal or epigastric pain. No nausea, vomiting, or hematemesis; No diarrhea or constipation. No melena or hematochezia.  GENITOURINARY: No dysuria, frequency, hematuria, or incontinence  NEUROLOGICAL: No headaches, memory loss, loss of strength, numbness, or tremors  SKIN: No itching, burning, rashes, or lesions   LYMPH NODES: No enlarged glands  ENDOCRINE: No heat or cold intolerance; No hair loss  MUSCULOSKELETAL: No muscle or back pain  PSYCHIATRIC: No depression, anxiety, mood swings, or difficulty sleeping  HEME/LYMPH: No easy bruising, or bleeding gums  ALLERGY AND IMMUNOLOGIC: No hives or eczema    Vital Signs Last 24 Hrs  T(C): 36.7 (08 Dec 2017 10:03), Max: 36.7 (07 Dec 2017 13:10)  T(F): 98 (08 Dec 2017 10:03), Max: 98.1 (08 Dec 2017 00:00)  HR: 74 (08 Dec 2017 10:03) (63 - 83)  BP: 128/73 (08 Dec 2017 10:03) (102/67 - 139/70)  BP(mean): --  RR: 18 (08 Dec 2017 10:03) (12 - 166)  SpO2: 99% (08 Dec 2017 10:03) (95% - 100%)  CAPILLARY BLOOD GLUCOSE          PHYSICAL EXAM:    GENERAL: NAD, well-groomed, well-developed  HEAD:  Atraumatic, Normocephalic  EYES: EOMI, PERRLA, conjunctiva and sclera clear  ENMT: Moist mucous membranes  NECK: Brace in place.  Drain in place.  RESPIRATORY: Clear to auscultation bilaterally; No rales, rhonchi, wheezing, or rubs  CARDIOVASCULAR: Regular rate and rhythm; No murmurs, rubs, or gallops  GASTROINTESTINAL: Soft, Nontender, Nondistended; Bowel sounds present  BACK: No tenderness  GENITOURINARY: Not examined  EXTREMITIES:  2+ Peripheral Pulses, No clubbing, cyanosis, or edema  NERVOUS SYSTEM:  Alert & Oriented X3; Moving all 4 extremities; No gross sensory deficits  PSYCH: Calm  HEME/LYMPH: No lymphadenopathy noted  SKIN: No rashes or lesions; Incisions C/D/I    LABS:                        10.7   12.90 )-----------( 271      ( 08 Dec 2017 06:00 )             33.9     12-08    137  |  100  |  19  ----------------------------<  128<H>  4.6   |  26  |  1.51<H>    Ca    8.5      08 Dec 2017 06:00          CAPILLARY BLOOD GLUCOSE          RADIOLOGY & ADDITIONAL STUDIES:    Imaging:     Personally Reviewed:  [ ] YES               EKG:   Personally Reviewed:  [ ] YES       Care Discussed with Consultant(s)/Other Providers:  Care Discussed with Primary Team.      [] Increased delirium risk  [] Delirium and other risks can be reduced by:          -early ambulation          -minimizing "tethers" - IV, oxygen, catheters, etc          -avoiding hypnotics and sedatives          -maintaining hydration/nutrition          -avoid anticholinergics - diphenhydramine, etc          -pain control          -supportive environment

## 2017-12-08 NOTE — DISCHARGE NOTE ADULT - CARE PROVIDER_API CALL
Randy Guevara), Orthopaedic Surgery  [address] [place] [address]  Phone: [phone]  Fax: [phone] Randy Guevara), Orthopaedic Surgery  [address]  [place] [address]  Phone: [phone]  Fax: [phone]    Gurinder Matthew), Cardiovascular Disease; Internal Medicine  [address]  [place] [address]  Phone: [phone]  Fax: [phone]

## 2017-12-08 NOTE — DISCHARGE NOTE ADULT - PLAN OF CARE
pain control, surgical site healing, ambulation, return to ADL's Patient is now s/p C3-C7 laminectomy and fusion on 12/7/17. Patient tolerated the procedure well without any intraoperative complications. Patient tolerated physical therapy well and pain is controlled. Seen by medical attending for continuity of care and management and cleared for safe discharge. As per surgeon patient stable and ready for discharge.     Please follow up with  on 1-2 weeks, please call [phone] for appointment time and date. Please follow up with your PMD for continuity of care and management as medications may have changed. Do not take any NSAIDS (motrin, advil, ibuprofren, aleve), Aspirin, Antiinflammatory medications unless instructed by your orthopaedic surgeon to continue. Avoid any heavy lifting, bending, squatting, twisting motion. Keep dressing/incision clean, dry, intact. Any suture/staples to be removed on post op day # 14 at office visit. Weight bear as tolerated. Patient is now s/p C3-C7 laminectomy and fusion on 12/7/17. Patient tolerated the procedure well without any intraoperative complications. Patient tolerated physical therapy well and pain is controlled. Seen by medical attending for continuity of care and management and cleared for safe discharge. As per surgeon patient stable and ready for discharge.     Please follow up with  on Monday 12/18 at 1:30pm. Please follow up with your PMD for continuity of care and management as medications may have changed. Do not take any NSAIDS (motrin, advil, ibuprofren, aleve), Aspirin, Antiinflammatory medications unless instructed by your orthopaedic surgeon to continue. Avoid any heavy lifting, bending, squatting, twisting motion. Keep dressing/incision clean, dry, intact. Any suture/staples to be removed on post op day # 14 at office visit. Weight bear as tolerated. Patient is now s/p C3-C7 laminectomy and fusion on 12/7/17. Patient tolerated the procedure well without any intraoperative complications. Patient tolerated physical therapy well and pain is controlled. Seen by medical attending for continuity of care and management and cleared for safe discharge. As per surgeon patient stable and ready for discharge.     Please follow up with  on Monday 12/18 at 1:30pm. Please follow up with PMD within 2 weeks as medications were adjusted during hospital stay.  In addition, please follow up within 1 week at Dr Moran (cardiology) office for an outpatient echocardiogram, call to make an appointment.  Do not take any NSAIDS (motrin, advil, ibuprofren, aleve), or antiinflammatory medications unless instructed by your orthopaedic surgeon to continue.  Patient may however resume ASA 81 mg daily starting tomorrow, 12/12 as per Dr Guevara for cardiac health.  Avoid any heavy lifting, bending, squatting, twisting motion. Keep dressing/incision clean, dry, intact. Any suture/staples to be removed on post op day # 14 at office visit. Weight bear as tolerated.

## 2017-12-08 NOTE — DISCHARGE NOTE ADULT - HOSPITAL COURSE
Patient is now s/p C3-C7 laminectomy and fusion on 12/7/17. Patient tolerated the procedure well without any intraoperative complications. Patient tolerated physical therapy well and pain is controlled. Seen by medical attending for continuity of care and management and cleared for safe discharge. As per surgeon patient stable and ready for discharge.     Please follow up with  on 1-2 weeks, please call [phone] for appointment time and date. Please follow up with your PMD for continuity of care and management as medications may have changed. Do not take any NSAIDS (motrin, advil, ibuprofren, aleve), Aspirin, Antiinflammatory medications unless instructed by your orthopaedic surgeon to continue. Avoid any heavy lifting, bending, squatting, twisting motion. Keep dressing/incision clean, dry, intact. Any suture/staples to be removed on post op day # 14 at office visit. Weight bear as tolerated. Patient is now s/p C3-C7 laminectomy and fusion on 12/7/17. Patient tolerated the procedure well without any intraoperative complications. Patient tolerated physical therapy well and pain is controlled. Seen by medical attending for continuity of care and management and cleared for safe discharge. As per surgeon patient stable and ready for discharge.     Please follow up with  on Monday 12/18 at 1:30pm. Please follow up with your PMD for continuity of care and management as medications may have changed. Do not take any NSAIDS (motrin, advil, ibuprofren, aleve), Aspirin, Antiinflammatory medications unless instructed by your orthopaedic surgeon to continue. Avoid any heavy lifting, bending, squatting, twisting motion. Keep dressing/incision clean, dry, intact. Any suture/staples to be removed on post op day # 14 at office visit. Weight bear as tolerated. Patient is now s/p C3-C7 laminectomy and fusion on 12/7/17. Patient tolerated the procedure well without any intraoperative complications. Patient tolerated physical therapy well and pain is controlled. Seen by medical attending for continuity of care and management and cleared for safe discharge. As per surgeon patient stable and ready for discharge.     Please follow up with  on Monday 12/18 at 1:30pm. Please follow up with PMD within 2 weeks as medications were adjusted during hospital stay.  In addition, please follow up within 1 week at Dr Moran (cardiology) office for an outpatient echocardiogram, call to make an appointment.  Do not take any NSAIDS (motrin, advil, ibuprofren, aleve), or antiinflammatory medications unless instructed by your orthopaedic surgeon to continue.  Patient may however resume ASA 81 mg daily starting tomorrow, 12/12 as per Dr Guevara for cardiac health.  Avoid any heavy lifting, bending, squatting, twisting motion. Keep dressing/incision clean, dry, intact. Any suture/staples to be removed on post op day # 14 at office visit. Weight bear as tolerated.

## 2017-12-08 NOTE — PROGRESS NOTE ADULT - SUBJECTIVE AND OBJECTIVE BOX
Orthopaedic Surgery Progress Note    Subjective:   Patient seen and examined  No acute events overnight  Pain well controlled with PCA  Bilateral hand decreased sensation improving    Objective:  T(C): 36.4 (12-08-17 @ 02:26), Max: 36.7 (12-07-17 @ 13:10)  HR: 76 (12-08-17 @ 02:26) (63 - 83)  BP: 126/58 (12-08-17 @ 02:26) (102/67 - 139/70)  RR: 16 (12-08-17 @ 02:26) (12 - 166)  SpO2: 95% (12-08-17 @ 02:26) (95% - 100%)    12-07 @ 07:01  -  12-08 @ 07:00  --------------------------------------------------------  IN: 300 mL / OUT: 880 mL / NET: -580 mL    PE  NAD  Neck:   dressing C/D/I, c-spine collar in place  HMV in place with SS drainage  Neuro:  BUE:   5/5 delt/bi/tric/EPL/IO/FDS  SILT C5-T1, improved from pre-op  WWP distally                      10.7   12.90 )-----------( 271      ( 08 Dec 2017 06:00 )             33.9     12-07    139  |  102  |  20  ----------------------------<  150<H>  4.1   |  22  |  1.54<H>    Ca    8.5      07 Dec 2017 23:59    62y Male POD#1 s/p C3-C7 laminectomy/fusion     - Pain control  - c-spine collar  - monitor HMV output  - WBAT  - PT/OT/OOB  - I/S  - SCDs  - Dispo planning: f/u PT recs    Jhonathan Mason MD

## 2017-12-08 NOTE — DISCHARGE NOTE ADULT - MEDICATION SUMMARY - MEDICATIONS TO CHANGE
I will SWITCH the dose or number of times a day I take the medications listed below when I get home from the hospital:  None I will SWITCH the dose or number of times a day I take the medications listed below when I get home from the hospital:    aspirin 81 mg oral tablet  -- 1 tab(s) by mouth once a day last dose on 12/3/17

## 2017-12-08 NOTE — CONSULT NOTE ADULT - PROBLEM SELECTOR RECOMMENDATION 2
Pain control with dilaudid PCA pump. IPC socks for DVT prophylaxis. Surgical site wound management as per primary team.

## 2017-12-08 NOTE — DISCHARGE NOTE ADULT - CARE PROVIDERS DIRECT ADDRESSES
[email or web address]

## 2017-12-08 NOTE — DISCHARGE NOTE ADULT - FINDINGS/TREATMENT
Patient is now s/p C3-C7 laminectomy and fusion on 12/7/17. Patient tolerated the procedure well without any intraoperative complications. Patient tolerated physical therapy well and pain is controlled. Seen by medical attending for continuity of care and management and cleared for safe discharge. As per surgeon patient stable and ready for discharge.     Please follow up with  on 1-2 weeks, please call [phone] for appointment time and date. Please follow up with your PMD for continuity of care and management as medications may have changed. Do not take any NSAIDS (motrin, advil, ibuprofren, aleve), Aspirin, Antiinflammatory medications unless instructed by your orthopaedic surgeon to continue. Avoid any heavy lifting, bending, squatting, twisting motion. Keep dressing/incision clean, dry, intact. Any suture/staples to be removed on post op day # 14 at office visit. Weight bear as tolerated. Patient is now s/p C3-C7 laminectomy and fusion on 12/7/17. Patient tolerated the procedure well without any intraoperative complications. Patient tolerated physical therapy well and pain is controlled. Seen by medical attending for continuity of care and management and cleared for safe discharge. As per surgeon patient stable and ready for discharge.     Please follow up with  on Monday 12/18 at 1:30pm. Please follow up with your PMD for continuity of care and management as medications may have changed. Do not take any NSAIDS (motrin, advil, ibuprofren, aleve), Aspirin, Antiinflammatory medications unless instructed by your orthopaedic surgeon to continue. Avoid any heavy lifting, bending, squatting, twisting motion. Keep dressing/incision clean, dry, intact. Any suture/staples to be removed on post op day # 14 at office visit. Weight bear as tolerated.

## 2017-12-08 NOTE — DISCHARGE NOTE ADULT - MEDICATION SUMMARY - MEDICATIONS TO TAKE
I will START or STAY ON the medications listed below when I get home from the hospital:    oxyCODONE 5 mg oral tablet  -- 1-2 tab(s) by mouth every 4-6 hours, As Needed MDD:8   -- Caution federal law prohibits the transfer of this drug to any person other  than the person for whom it was prescribed.  It is very important that you take or use this exactly as directed.  Do not skip doses or discontinue unless directed by your doctor.  May cause drowsiness.  Alcohol may intensify this effect.  Use care when operating dangerous machinery.  This prescription cannot be refilled.  Using more of this medication than prescribed may cause serious breathing problems.    -- Indication: For Pain medication    oxyCODONE 20 mg oral tablet  -- 1 tab(s) by mouth every 6 hours, As Needed  -- Indication: For Home medication     losartan 50 mg oral tablet  -- 1 tab(s) by mouth 2 times a day  -- Indication: For Home medication    tamsulosin 0.4 mg oral capsule  -- 1 cap(s) by mouth once a day (at bedtime)  -- Indication: For Home medication     gabapentin 800 mg oral tablet  -- 1 tab(s) by mouth 2 times a day  -- Indication: For Home medication    fenofibrate 134 mg oral capsule  -- 1 cap(s) by mouth once a day in am  -- Indication: For Home medication    triamterene-hydrochlorothiazide 37.5 mg-25 mg oral tablet  -- 1 tab(s) by mouth once a day  -- Indication: For Home medication    atenolol 100 mg oral tablet  -- 1 tab(s) by mouth once a day in am  -- Indication: For Home medication    furosemide 20 mg oral tablet  -- 1 tab(s) by mouth every other day  -- Indication: For Homem medication    senna oral tablet  -- 2 tab(s) by mouth once a day (at bedtime), As Needed for constipation  -- Indication: For Bowel regiment     Colace 100 mg oral capsule  -- 1 cap(s) by mouth 2 times a day  -- Indication: For Bowel regiemnt     baclofen 10 mg oral tablet  -- 1 tab(s) by mouth 3 times a day  -- Indication: For muscle relaxer    oxybutynin 10 mg/24 hr oral tablet, extended release  -- 1 tab(s) by mouth once a day  -- Indication: For Home medication I will START or STAY ON the medications listed below when I get home from the hospital:    aspirin 81 mg oral tablet  -- 1 tab(s) by mouth once a day to resume on 12/12  -- Indication: For Cardiac health --> to resume on 12/12    oxyCODONE 5 mg oral tablet  -- 1-2 tab(s) by mouth every 4-6 hours, As Needed MDD:8   -- Caution federal law prohibits the transfer of this drug to any person other  than the person for whom it was prescribed.  It is very important that you take or use this exactly as directed.  Do not skip doses or discontinue unless directed by your doctor.  May cause drowsiness.  Alcohol may intensify this effect.  Use care when operating dangerous machinery.  This prescription cannot be refilled.  Using more of this medication than prescribed may cause serious breathing problems.    -- Indication: For Pain med    oxyCODONE 20 mg oral tablet  -- 1 tab(s) by mouth every 6 hours, As Needed  -- Indication: For Home medication     tamsulosin 0.4 mg oral capsule  -- 1 cap(s) by mouth once a day (at bedtime)  -- Indication: For Home medication     gabapentin 800 mg oral tablet  -- 1 tab(s) by mouth 2 times a day  -- Indication: For Home medication    fenofibrate 134 mg oral capsule  -- 1 cap(s) by mouth once a day in am  -- Indication: For Home medication    furosemide 20 mg oral tablet  -- 1 tab(s) by mouth every other day  -- Indication: For HTN    famotidine 20 mg oral tablet  -- 1 tab(s) by mouth every 12 hours, As needed, Dyspepsia  -- Indication: For GI protection/GERD    senna oral tablet  -- 2 tab(s) by mouth once a day (at bedtime), As Needed for constipation  -- Indication: For Stool softener    Colace 100 mg oral capsule  -- 1 cap(s) by mouth 2 times a day  -- Indication: For Bowel regiemnt     baclofen 10 mg oral tablet  -- 1 tab(s) by mouth 3 times a day MDD:3  -- Indication: For Muscle relaxer    oxybutynin 10 mg/24 hr oral tablet, extended release  -- 1 tab(s) by mouth once a day  -- Indication: For Home medication I will START or STAY ON the medications listed below when I get home from the hospital:    aspirin 81 mg oral tablet  -- 1 tab(s) by mouth once a day to resume on 12/12  -- Indication: For Cardiac health --> to resume on 12/12    oxyCODONE 5 mg oral tablet  -- 1-2 tab(s) by mouth every 4-6 hours, As Needed MDD:8   -- Caution federal law prohibits the transfer of this drug to any person other  than the person for whom it was prescribed.  It is very important that you take or use this exactly as directed.  Do not skip doses or discontinue unless directed by your doctor.  May cause drowsiness.  Alcohol may intensify this effect.  Use care when operating dangerous machinery.  This prescription cannot be refilled.  Using more of this medication than prescribed may cause serious breathing problems.    -- Indication: For Pain med    tamsulosin 0.4 mg oral capsule  -- 1 cap(s) by mouth once a day (at bedtime)  -- Indication: For Home medication     gabapentin 800 mg oral tablet  -- 1 tab(s) by mouth 2 times a day  -- Indication: For Home medication    fenofibrate 134 mg oral capsule  -- 1 cap(s) by mouth once a day in am  -- Indication: For Home medication    furosemide 20 mg oral tablet  -- 1 tab(s) by mouth every other day  -- Indication: For HTN    famotidine 20 mg oral tablet  -- 1 tab(s) by mouth every 12 hours, As needed, Dyspepsia  -- Indication: For GI protection/GERD    senna oral tablet  -- 2 tab(s) by mouth once a day (at bedtime), As Needed for constipation  -- Indication: For Stool softener    Colace 100 mg oral capsule  -- 1 cap(s) by mouth 2 times a day  -- Indication: For Bowel regiemnt     baclofen 10 mg oral tablet  -- 1 tab(s) by mouth 3 times a day MDD:3  -- Indication: For Muscle relaxer    oxybutynin 10 mg/24 hr oral tablet, extended release  -- 1 tab(s) by mouth once a day  -- Indication: For Home medication

## 2017-12-08 NOTE — DISCHARGE NOTE ADULT - MEDICATION SUMMARY - MEDICATIONS TO STOP TAKING
I will STOP taking the medications listed below when I get home from the hospital:    diclofenac sodium 75 mg oral delayed release tablet  -- 1 tab(s) by mouth 2 times a day last dose on 12/5/17 I will STOP taking the medications listed below when I get home from the hospital:    atenolol 100 mg oral tablet  -- 1 tab(s) by mouth once a day in am    triamterene-hydrochlorothiazide 37.5 mg-25 mg oral tablet  -- 1 tab(s) by mouth once a day    losartan 50 mg oral tablet  -- 1 tab(s) by mouth 2 times a day    diclofenac sodium 75 mg oral delayed release tablet  -- 1 tab(s) by mouth 2 times a day last dose on 12/5/17 I will STOP taking the medications listed below when I get home from the hospital:    atenolol 100 mg oral tablet  -- 1 tab(s) by mouth once a day in am    triamterene-hydrochlorothiazide 37.5 mg-25 mg oral tablet  -- 1 tab(s) by mouth once a day    oxyCODONE 20 mg oral tablet  -- 1 tab(s) by mouth every 6 hours, As Needed    losartan 50 mg oral tablet  -- 1 tab(s) by mouth 2 times a day    diclofenac sodium 75 mg oral delayed release tablet  -- 1 tab(s) by mouth 2 times a day last dose on 12/5/17

## 2017-12-08 NOTE — DISCHARGE NOTE ADULT - PATIENT PORTAL LINK FT
“You can access the Follow[employer] Patient Portal, offered by [facility], by registering with the following website: [email or web address]”

## 2017-12-09 DIAGNOSIS — I95.9 HYPOTENSION, UNSPECIFIED: ICD-10-CM

## 2017-12-09 LAB
BASE EXCESS BLDA CALC-SCNC: 2.1 MMOL/L — SIGNIFICANT CHANGE UP
BASOPHILS # BLD AUTO: 0.03 K/UL — SIGNIFICANT CHANGE UP (ref 0–0.2)
BASOPHILS NFR BLD AUTO: 0.3 % — SIGNIFICANT CHANGE UP (ref 0–2)
BLD GP AB SCN SERPL QL: NEGATIVE — SIGNIFICANT CHANGE UP
BUN SERPL-MCNC: 22 MG/DL — SIGNIFICANT CHANGE UP (ref 7–23)
CA-I BLDA-SCNC: 1.19 MMOL/L — SIGNIFICANT CHANGE UP (ref 1.15–1.29)
CALCIUM SERPL-MCNC: 8.5 MG/DL — SIGNIFICANT CHANGE UP (ref 8.4–10.5)
CHLORIDE SERPL-SCNC: 103 MMOL/L — SIGNIFICANT CHANGE UP (ref 98–107)
CO2 SERPL-SCNC: 26 MMOL/L — SIGNIFICANT CHANGE UP (ref 22–31)
CREAT SERPL-MCNC: 1.34 MG/DL — HIGH (ref 0.5–1.3)
EOSINOPHIL # BLD AUTO: 0.06 K/UL — SIGNIFICANT CHANGE UP (ref 0–0.5)
EOSINOPHIL NFR BLD AUTO: 0.6 % — SIGNIFICANT CHANGE UP (ref 0–6)
GLUCOSE BLDA-MCNC: 113 MG/DL — HIGH (ref 70–99)
GLUCOSE BLDC GLUCOMTR-MCNC: 110 MG/DL — HIGH (ref 70–99)
GLUCOSE SERPL-MCNC: 97 MG/DL — SIGNIFICANT CHANGE UP (ref 70–99)
HCO3 BLDA-SCNC: 27 MMOL/L — HIGH (ref 22–26)
HCT VFR BLD CALC: 30.3 % — LOW (ref 39–50)
HCT VFR BLD CALC: 31 % — LOW (ref 39–50)
HCT VFR BLDA CALC: 30.4 % — LOW (ref 39–51)
HGB BLD-MCNC: 9.6 G/DL — LOW (ref 13–17)
HGB BLD-MCNC: 9.8 G/DL — LOW (ref 13–17)
HGB BLDA-MCNC: 9.8 G/DL — LOW (ref 13–17)
IMM GRANULOCYTES # BLD AUTO: 0.04 # — SIGNIFICANT CHANGE UP
IMM GRANULOCYTES NFR BLD AUTO: 0.4 % — SIGNIFICANT CHANGE UP (ref 0–1.5)
LACTATE BLDA-SCNC: 1.5 MMOL/L — SIGNIFICANT CHANGE UP (ref 0.5–2)
LYMPHOCYTES # BLD AUTO: 2.12 K/UL — SIGNIFICANT CHANGE UP (ref 1–3.3)
LYMPHOCYTES # BLD AUTO: 20.7 % — SIGNIFICANT CHANGE UP (ref 13–44)
MCHC RBC-ENTMCNC: 25.9 PG — LOW (ref 27–34)
MCHC RBC-ENTMCNC: 26.2 PG — LOW (ref 27–34)
MCHC RBC-ENTMCNC: 31.6 % — LOW (ref 32–36)
MCHC RBC-ENTMCNC: 31.7 % — LOW (ref 32–36)
MCV RBC AUTO: 81.7 FL — SIGNIFICANT CHANGE UP (ref 80–100)
MCV RBC AUTO: 82.9 FL — SIGNIFICANT CHANGE UP (ref 80–100)
MONOCYTES # BLD AUTO: 0.75 K/UL — SIGNIFICANT CHANGE UP (ref 0–0.9)
MONOCYTES NFR BLD AUTO: 7.3 % — SIGNIFICANT CHANGE UP (ref 2–14)
NEUTROPHILS # BLD AUTO: 7.26 K/UL — SIGNIFICANT CHANGE UP (ref 1.8–7.4)
NEUTROPHILS NFR BLD AUTO: 70.7 % — SIGNIFICANT CHANGE UP (ref 43–77)
NRBC # FLD: 0 — SIGNIFICANT CHANGE UP
NRBC # FLD: 0 — SIGNIFICANT CHANGE UP
PCO2 BLDA: 36 MMHG — SIGNIFICANT CHANGE UP (ref 35–48)
PH BLDA: 7.46 PH — HIGH (ref 7.35–7.45)
PLATELET # BLD AUTO: 236 K/UL — SIGNIFICANT CHANGE UP (ref 150–400)
PLATELET # BLD AUTO: 237 K/UL — SIGNIFICANT CHANGE UP (ref 150–400)
PMV BLD: 10.8 FL — SIGNIFICANT CHANGE UP (ref 7–13)
PMV BLD: 11 FL — SIGNIFICANT CHANGE UP (ref 7–13)
PO2 BLDA: 148 MMHG — HIGH (ref 83–108)
POTASSIUM BLDA-SCNC: 3.5 MMOL/L — SIGNIFICANT CHANGE UP (ref 3.4–4.5)
POTASSIUM SERPL-MCNC: 4 MMOL/L — SIGNIFICANT CHANGE UP (ref 3.5–5.3)
POTASSIUM SERPL-SCNC: 4 MMOL/L — SIGNIFICANT CHANGE UP (ref 3.5–5.3)
RBC # BLD: 3.71 M/UL — LOW (ref 4.2–5.8)
RBC # BLD: 3.74 M/UL — LOW (ref 4.2–5.8)
RBC # FLD: 14.6 % — HIGH (ref 10.3–14.5)
RBC # FLD: 14.6 % — HIGH (ref 10.3–14.5)
RH IG SCN BLD-IMP: POSITIVE — SIGNIFICANT CHANGE UP
SAO2 % BLDA: 99.3 % — HIGH (ref 95–99)
SODIUM BLDA-SCNC: 135 MMOL/L — LOW (ref 136–146)
SODIUM SERPL-SCNC: 141 MMOL/L — SIGNIFICANT CHANGE UP (ref 135–145)
WBC # BLD: 10.26 K/UL — SIGNIFICANT CHANGE UP (ref 3.8–10.5)
WBC # BLD: 9.47 K/UL — SIGNIFICANT CHANGE UP (ref 3.8–10.5)
WBC # FLD AUTO: 10.26 K/UL — SIGNIFICANT CHANGE UP (ref 3.8–10.5)
WBC # FLD AUTO: 9.47 K/UL — SIGNIFICANT CHANGE UP (ref 3.8–10.5)

## 2017-12-09 PROCEDURE — 99291 CRITICAL CARE FIRST HOUR: CPT

## 2017-12-09 PROCEDURE — 93010 ELECTROCARDIOGRAM REPORT: CPT

## 2017-12-09 PROCEDURE — 99233 SBSQ HOSP IP/OBS HIGH 50: CPT

## 2017-12-09 PROCEDURE — 70450 CT HEAD/BRAIN W/O DYE: CPT | Mod: 26

## 2017-12-09 RX ORDER — SODIUM CHLORIDE 9 MG/ML
1000 INJECTION INTRAMUSCULAR; INTRAVENOUS; SUBCUTANEOUS ONCE
Qty: 0 | Refills: 0 | Status: COMPLETED | OUTPATIENT
Start: 2017-12-09 | End: 2017-12-09

## 2017-12-09 RX ADMIN — Medication 1 TABLET(S): at 05:39

## 2017-12-09 RX ADMIN — SODIUM CHLORIDE 3 MILLILITER(S): 9 INJECTION INTRAMUSCULAR; INTRAVENOUS; SUBCUTANEOUS at 05:40

## 2017-12-09 RX ADMIN — SODIUM CHLORIDE 3 MILLILITER(S): 9 INJECTION INTRAMUSCULAR; INTRAVENOUS; SUBCUTANEOUS at 22:04

## 2017-12-09 RX ADMIN — LOSARTAN POTASSIUM 50 MILLIGRAM(S): 100 TABLET, FILM COATED ORAL at 05:39

## 2017-12-09 RX ADMIN — FAMOTIDINE 20 MILLIGRAM(S): 10 INJECTION INTRAVENOUS at 05:39

## 2017-12-09 RX ADMIN — Medication 145 MILLIGRAM(S): at 22:03

## 2017-12-09 RX ADMIN — Medication 650 MILLIGRAM(S): at 17:42

## 2017-12-09 RX ADMIN — Medication 5 MILLIGRAM(S): at 17:41

## 2017-12-09 RX ADMIN — Medication 650 MILLIGRAM(S): at 13:43

## 2017-12-09 RX ADMIN — GABAPENTIN 800 MILLIGRAM(S): 400 CAPSULE ORAL at 17:41

## 2017-12-09 RX ADMIN — Medication 100 MILLIGRAM(S): at 05:39

## 2017-12-09 RX ADMIN — Medication 650 MILLIGRAM(S): at 05:39

## 2017-12-09 RX ADMIN — Medication 100 MILLIGRAM(S): at 17:41

## 2017-12-09 RX ADMIN — OXYCODONE HYDROCHLORIDE 10 MILLIGRAM(S): 5 TABLET ORAL at 22:01

## 2017-12-09 RX ADMIN — OXYCODONE HYDROCHLORIDE 10 MILLIGRAM(S): 5 TABLET ORAL at 22:31

## 2017-12-09 RX ADMIN — Medication 10 MILLIGRAM(S): at 22:03

## 2017-12-09 RX ADMIN — SODIUM CHLORIDE 150 MILLILITER(S): 9 INJECTION, SOLUTION INTRAVENOUS at 09:08

## 2017-12-09 RX ADMIN — Medication 10 MILLIGRAM(S): at 05:39

## 2017-12-09 RX ADMIN — Medication 5 MILLIGRAM(S): at 05:39

## 2017-12-09 RX ADMIN — SODIUM CHLORIDE 3 MILLILITER(S): 9 INJECTION INTRAMUSCULAR; INTRAVENOUS; SUBCUTANEOUS at 13:43

## 2017-12-09 RX ADMIN — GABAPENTIN 800 MILLIGRAM(S): 400 CAPSULE ORAL at 05:39

## 2017-12-09 RX ADMIN — Medication 10 MILLIGRAM(S): at 13:43

## 2017-12-09 RX ADMIN — TAMSULOSIN HYDROCHLORIDE 0.4 MILLIGRAM(S): 0.4 CAPSULE ORAL at 22:03

## 2017-12-09 RX ADMIN — SENNA PLUS 2 TABLET(S): 8.6 TABLET ORAL at 22:03

## 2017-12-09 RX ADMIN — Medication 650 MILLIGRAM(S): at 17:41

## 2017-12-09 RX ADMIN — SODIUM CHLORIDE 1000 MILLILITER(S): 9 INJECTION INTRAMUSCULAR; INTRAVENOUS; SUBCUTANEOUS at 09:15

## 2017-12-09 NOTE — CONSULT NOTE ADULT - SUBJECTIVE AND OBJECTIVE BOX
CHIEF COMPLAINT:Patient is a 62y old  Male who presents with a chief complaint of Patient is now s/p C3-C7 laminectomy and fusion on 12/7/17 (08 Dec 2017 10:52)      HISTORY OF PRESENT ILLNESS:  This is a pleasant gentleman with history as below s/p post cervical lami  post op number 2 sat up this morning to have breakfast noted to be diaphoretic and hypotensive to bp of 70 systolic and bradycardia  denies any chest pain, sob, palpitation, dizziness or syncope but very drowsy     PAST MEDICAL & SURGICAL HISTORY:  BPH (benign prostatic hyperplasia)  OA (osteoarthritis of the spine): of right hip  Spinal stenosis in cervical region  Other intervertebral disc displacement, lumbar region  Spinal stenosis, lumbosacral region  Obesity (BMI 30.0-34.9)  HTN (hypertension)  CHRISSIE on CPAP  Back pain: chronic  H/O laminectomy: and fusion in Jan 2017  History of hip surgery: s/p right hip replacement          MEDICATIONS:  ATENolol  Tablet 100 milliGRAM(s) Oral daily  furosemide    Tablet 20 milliGRAM(s) Oral every other day  losartan 50 milliGRAM(s) Oral daily  tamsulosin 0.4 milliGRAM(s) Oral at bedtime  triamterene 37.5 mG/hydrochlorothiazide 25 mG Tablet 1 Tablet(s) Oral daily      diphenhydrAMINE   Injectable 12.5 milliGRAM(s) IV Push every 4 hours PRN  diphenhydrAMINE   Injectable 25 milliGRAM(s) IV Push every 4 hours PRN    acetaminophen   Tablet 650 milliGRAM(s) Oral every 6 hours PRN  acetaminophen   Tablet. 650 milliGRAM(s) Oral every 6 hours  baclofen 10 milliGRAM(s) Oral three times a day  gabapentin 800 milliGRAM(s) Oral two times a day  HYDROmorphone  Injectable 1 milliGRAM(s) IV Push every 10 minutes PRN  HYDROmorphone  Injectable 1 milliGRAM(s) IV Push every 3 hours PRN  ondansetron Injectable 4 milliGRAM(s) IV Push every 6 hours PRN  oxyCODONE    IR 5 milliGRAM(s) Oral every 3 hours PRN  oxyCODONE    IR 10 milliGRAM(s) Oral every 3 hours PRN    aluminum hydroxide/magnesium hydroxide/simethicone Suspension 30 milliLiter(s) Oral every 12 hours PRN  docusate sodium 100 milliGRAM(s) Oral two times a day  famotidine    Tablet 20 milliGRAM(s) Oral every 12 hours PRN  magnesium hydroxide Suspension 30 milliLiter(s) Oral every 12 hours PRN  senna 2 Tablet(s) Oral at bedtime    fenofibrate Tablet 145 milliGRAM(s) Oral daily    lactated ringers. 1000 milliLiter(s) IV Continuous <Continuous>  oxybutynin 5 milliGRAM(s) Oral two times a day      FAMILY HISTORY:  Family history of hypertension in mother (Mother)  Family history of Alzheimer's disease (Father)      Non-contributory    SOCIAL HISTORY:    No tobacco, drugs or etoh    Allergies    No Known Drug Allergies  shellfish (Rash; Anaphylaxis)    Intolerances    	    REVIEW OF SYSTEMS:  as above  The rest of the 14 points ROS reviewed and except above they are unremarkable.        PHYSICAL EXAM:  T(C): 36.8 (12-09-17 @ 09:15), Max: 36.9 (12-09-17 @ 09:08)  HR: 51 (12-09-17 @ 09:15) (47 - 80)  BP: 97/51 (12-09-17 @ 09:15) (70/47 - 138/60)  RR: 16 (12-09-17 @ 09:08) (16 - 18)  SpO2: 100% (12-09-17 @ 09:08) (99% - 100%)  Wt(kg): --  I&O's Summary    08 Dec 2017 07:01  -  09 Dec 2017 07:00  --------------------------------------------------------  IN: 0 mL / OUT: 3057.5 mL / NET: -3057.5 mL        Appearance: Normal	  HEENT:   Normal oral mucosa, PERRL, EOMI	  Cardiovascular: Normal S1 S2,    Murmur:   Neck: JVP normal  Respiratory: Lungs clear to auscultation  Gastrointestinal:  Soft, Non-tender, + BS	  Skin: normal   Neuro: No gross deficits.   Psychiatry:  Mood & affect appropriate  Ext: No edema    LABS/DATA:    TELEMETRY: 	    ECG:  	   	  CARDIAC MARKERS:                                  9.8    10.26 )-----------( 236      ( 09 Dec 2017 05:18 )             31.0     12-09    141  |  103  |  22  ----------------------------<  97  4.0   |  26  |  1.34<H>    Ca    8.5      09 Dec 2017 05:18      proBNP:   Lipid Profile:   HgA1c:   TSH:

## 2017-12-09 NOTE — PROGRESS NOTE ADULT - SUBJECTIVE AND OBJECTIVE BOX
Patient is a 62y old  Male who presents with a chief complaint of Patient is now s/p C3-C7 laminectomy and fusion on 12/7/17 (08 Dec 2017 10:52)    SUBJECTIVE / OVERNIGHT EVENTS:  Events of earlier today noted.  Pt sleeping --> woke to voice.  Says he's doing ok.  Pain controlled with meds.  Says he doesn't wear his cpap machine much at home but should.    MEDICATIONS  (STANDING):  acetaminophen   Tablet. 650 milliGRAM(s) Oral every 6 hours  baclofen 10 milliGRAM(s) Oral three times a day  docusate sodium 100 milliGRAM(s) Oral two times a day  fenofibrate Tablet 145 milliGRAM(s) Oral daily  furosemide    Tablet 20 milliGRAM(s) Oral every other day  gabapentin 800 milliGRAM(s) Oral two times a day  lactated ringers. 1000 milliLiter(s) (150 mL/Hr) IV Continuous <Continuous>  oxybutynin 5 milliGRAM(s) Oral two times a day  senna 2 Tablet(s) Oral at bedtime  sodium chloride 0.9% lock flush 3 milliLiter(s) IV Push every 8 hours  tamsulosin 0.4 milliGRAM(s) Oral at bedtime    MEDICATIONS  (PRN):  acetaminophen   Tablet 650 milliGRAM(s) Oral every 6 hours PRN For Temp greater than 38 C (100.4 F)  aluminum hydroxide/magnesium hydroxide/simethicone Suspension 30 milliLiter(s) Oral every 12 hours PRN Indigestion  diphenhydrAMINE   Injectable 12.5 milliGRAM(s) IV Push every 4 hours PRN Itching and insomnia  diphenhydrAMINE   Injectable 25 milliGRAM(s) IV Push every 4 hours PRN Pruritus  famotidine    Tablet 20 milliGRAM(s) Oral every 12 hours PRN Dyspepsia  HYDROmorphone  Injectable 1 milliGRAM(s) IV Push every 10 minutes PRN Severe Pain (7 - 10)  HYDROmorphone  Injectable 1 milliGRAM(s) IV Push every 3 hours PRN Severe Pain (7 - 10)  magnesium hydroxide Suspension 30 milliLiter(s) Oral every 12 hours PRN Constipation  naloxone Injectable 0.1 milliGRAM(s) IV Push every 3 minutes PRN For ANY of the following changes in patient status:  A. RR LESS THAN 10 breaths per minute, B. Oxygen saturation LESS THAN 90%, C. Sedation score of 6  ondansetron Injectable 4 milliGRAM(s) IV Push every 6 hours PRN Nausea  oxyCODONE    IR 5 milliGRAM(s) Oral every 3 hours PRN Moderate Pain (4 - 6)  oxyCODONE    IR 10 milliGRAM(s) Oral every 3 hours PRN Severe Pain (7 - 10)    CAPILLARY BLOOD GLUCOSE  POCT Blood Glucose.: 110 mg/dL (09 Dec 2017 09:24)    I&O's Summary    08 Dec 2017 07:01  -  09 Dec 2017 07:00  --------------------------------------------------------  IN: 0 mL / OUT: 3057.5 mL / NET: -3057.5 mL    09 Dec 2017 07:01  -  09 Dec 2017 18:52  --------------------------------------------------------  IN: 2920 mL / OUT: 2765 mL / NET: 155 mL    Vital Signs Last 24 Hrs  T(C): 36.4 (09 Dec 2017 17:22), Max: 36.9 (09 Dec 2017 09:08)  T(F): 97.5 (09 Dec 2017 17:22), Max: 98.4 (09 Dec 2017 09:08)  HR: 57 (09 Dec 2017 17:22) (47 - 75)  BP: 124/51 (09 Dec 2017 17:22) (70/47 - 141/47)  RR: 17 (09 Dec 2017 17:22) (16 - 18)  SpO2: 99% (09 Dec 2017 17:22) (99% - 100%)    PHYSICAL EXAM:  GENERAL: NAD, well-developed  HEAD:  Atraumatic, Normocephalic  EYES: EOMI, PERRLA, conjunctiva and sclera clear  NECK: wearing collar  CHEST/LUNG: Clear to auscultation bilaterally; No wheeze  HEART: Regular rate and rhythm; No murmurs, rubs, or gallops  ABDOMEN: Soft, Nontender, Nondistended; Bowel sounds present  EXTREMITIES:  2+ Peripheral Pulses, No clubbing, cyanosis, or edema  PSYCH: AAOx3  NEUROLOGY: non-focal  SKIN: No rashes or lesions    LABS:             9.6    9.47  )-----------( 237      ( 09 Dec 2017 10:46 )             30.3     141  |  103  |  22  ----------------------------<  97  4.0   |  26  |  1.34<H>    Ca    8.5      09 Dec 2017 05:18    RADIOLOGY & ADDITIONAL TESTS:    Imaging Personally Reviewed:    Consultant(s) Notes Reviewed:      Care Discussed with Consultants/Other Providers: ortho re overall care

## 2017-12-09 NOTE — CONSULT NOTE ADULT - ASSESSMENT
Hypotension and bradycardia   likely vagal phenomenon , HR is still low due to beta blockade  he is obtunded , drowsy   narcan given not much response  HR should be improving but still on low side  stat head CT rule out Bleed  SICU eval  neuro eval  hold antihypertensives   echo
62M spinal stenosis s/p C3-7 laminectomy and fusion on 12/7 complicated by post-op anemia and leukocytosis, CKD stage 4, CHRISSIE not on CPAP, HTN.

## 2017-12-09 NOTE — PROGRESS NOTE ADULT - PROBLEM SELECTOR PLAN 4
No clinical signs of infection.  Likely post-op reactive leukocytosis. No clinical signs of infection.  Likely post-op reactive leukocytosis. --> has normalized

## 2017-12-09 NOTE — PROGRESS NOTE ADULT - SUBJECTIVE AND OBJECTIVE BOX
No acute events overnight. Pain well controlled with pain medications.    Vital Signs Last 24 Hrs  T(C): 36.3 (09 Dec 2017 01:07), Max: 36.7 (08 Dec 2017 10:03)  T(F): 97.4 (09 Dec 2017 01:07), Max: 98 (08 Dec 2017 10:03)  HR: 55 (09 Dec 2017 01:07) (55 - 80)  BP: 134/61 (09 Dec 2017 01:07) (110/60 - 138/60)  BP(mean): --  RR: 16 (09 Dec 2017 01:07) (16 - 18)  SpO2: 99% (09 Dec 2017 01:07) (95% - 100%)    Exam:  Gen: NAD  Motor: 4/5 AIN/PIN/Median/Radial/Ulnar nerve distributions  Sensory: SILT Median/Radial/Ulnar nerve distributions  Vascular: 2+ Radial pulse  Dressing: Clean, Dry, Intact    A/P: 62 year old male s/p C3-7 Lami/Fusion  - Pain Control  - Regular Diet  - PT/OT, OOB  - Aspen collar  - Discharge Planning

## 2017-12-09 NOTE — CONSULT NOTE ADULT - ATTENDING COMMENTS
The patient is a critical care patient with hemodynamic and metabolic instability in SICU.  I have personally interviewed and examined this patient, reviewed labs and x-rays, discussed with other consultants, House staff and PA's.  I spent   45  minutes  in total providing critical care for the diagnoses, assessment and plan above.  These diagnoses are unrelated to the surgical procedure noted above.  Time involved in performance of separately billable procedures was not counted toward my critical care time.  There is no overlap.    Issues:  R41.82 Altered mental status, unspecified altered mental status type   - on my exam, patient mildly lethargic but awakens easily and is fully oriented and is reportedly much improved.  BP on my exam with SBP in 120's and HR 60.  Patient reports having h/o sleep apnea on home CPAP.  Given his bradycardia and hypotension in setting of lethargy in morning, I am concerned for CO2 retention.  His beta-blocker probably contributing but I doubt is primary issue.  CT head negative for acute process.  recommend nocturnal CPAP, decrease beta blocker or even hold.    R00.1 Bradycardia   - as above  I95.9 Hypotension, unspecified hypotension type   - currently SBP in 120's  Z98.1 S/P laminectomy with spinal fusion   - stable exam   N18.4 CKD (chronic kidney disease) stage 4, GFR 15-29 ml/min   - stable creatinine  D64.9 Anemia, unspecified type   - no clinical signs of bleeding presently

## 2017-12-09 NOTE — CONSULT NOTE ADULT - SUBJECTIVE AND OBJECTIVE BOX
SICU Consultation Note  =====================================================  HPI: 62y male POD#2 from C3-7 Lami/Fusion acutely hypotensive to systolic in 70s and bradycardic to 40-50s at 9am.  EKG done showing sinus fatemeh.  Pt given narcan and 1L NS bolus.  Pt received baclofen, gabapentin, losartan, ditropan, and triamterene/hctz at 6am.  Pt did not receive atenolol and has not been on CPAP in hospital.  Cardiology called and evaluated pt as well.      Allergies: shellfish (Rash; Anaphylaxis)    PAST MEDICAL & SURGICAL HISTORY:  BPH (benign prostatic hyperplasia)  OA (osteoarthritis of the spine): of right hip  Spinal stenosis in cervical region  Other intervertebral disc displacement, lumbar region  Spinal stenosis, lumbosacral region  Obesity (BMI 30.0-34.9)  HTN (hypertension)  CHRISSIE on CPAP  Back pain: chronic  H/O laminectomy: and fusion in Jan 2017  History of hip surgery: s/p right hip replacement    FAMILY HISTORY:  Family history of hypertension in mother (Mother)  Family history of Alzheimer's disease (Father)      SOCIAL HISTORY:  ***    ADVANCE DIRECTIVES: Presumed Full Code    REVIEW OF SYSTEMS:***  General: Non-Contributory  Skin/Breast: Non-Contributory  Ophthalmologic: Non-Contributory  ENMT: Non-Contributory  Respiratory and Thorax: Non-Contributory  Cardiovascular: Non-Contributory  Gastrointestinal: Non-Contributory  Genitourinary: Non-Contributory  Musculoskeletal: Non-Contributory  Neurological: Non-Contributory  Psychiatric: Non-Contributory  Hematology/Lymphatics: Non-Contributory  Endocrine: Non-Contributory  Allergic/Immunologic: Non-Contributory    HOME MEDICATIONS:  ***    CURRENT MEDICATIONS:   --------------------------------------------------------------------------------------  Neurologic Medications  acetaminophen   Tablet 650 milliGRAM(s) Oral every 6 hours PRN For Temp greater than 38 C (100.4 F)  acetaminophen   Tablet. 650 milliGRAM(s) Oral every 6 hours  baclofen 10 milliGRAM(s) Oral three times a day  gabapentin 800 milliGRAM(s) Oral two times a day  HYDROmorphone  Injectable 1 milliGRAM(s) IV Push every 10 minutes PRN Severe Pain (7 - 10)  HYDROmorphone  Injectable 1 milliGRAM(s) IV Push every 3 hours PRN Severe Pain (7 - 10)  ondansetron Injectable 4 milliGRAM(s) IV Push every 6 hours PRN Nausea  oxyCODONE    IR 5 milliGRAM(s) Oral every 3 hours PRN Moderate Pain (4 - 6)  oxyCODONE    IR 10 milliGRAM(s) Oral every 3 hours PRN Severe Pain (7 - 10)    Respiratory Medications  diphenhydrAMINE   Injectable 12.5 milliGRAM(s) IV Push every 4 hours PRN Itching and insomnia  diphenhydrAMINE   Injectable 25 milliGRAM(s) IV Push every 4 hours PRN Pruritus    Cardiovascular Medications  furosemide    Tablet 20 milliGRAM(s) Oral every other day  tamsulosin 0.4 milliGRAM(s) Oral at bedtime    Gastrointestinal Medications  aluminum hydroxide/magnesium hydroxide/simethicone Suspension 30 milliLiter(s) Oral every 12 hours PRN Indigestion  docusate sodium 100 milliGRAM(s) Oral two times a day  famotidine    Tablet 20 milliGRAM(s) Oral every 12 hours PRN Dyspepsia  lactated ringers. 1000 milliLiter(s) IV Continuous <Continuous>  magnesium hydroxide Suspension 30 milliLiter(s) Oral every 12 hours PRN Constipation  senna 2 Tablet(s) Oral at bedtime    Genitourinary Medications  oxybutynin 5 milliGRAM(s) Oral two times a day    Hematologic/Oncologic Medications    Antimicrobial/Immunologic Medications    Endocrine/Metabolic Medications  fenofibrate Tablet 145 milliGRAM(s) Oral daily    Topical/Other Medications  naloxone Injectable 0.1 milliGRAM(s) IV Push every 3 minutes PRN For ANY of the following changes in patient status:  A. RR LESS THAN 10 breaths per minute, B. Oxygen saturation LESS THAN 90%, C. Sedation score of 6  sodium chloride 0.9% lock flush 3 milliLiter(s) IV Push every 8 hours    --------------------------------------------------------------------------------------    VITAL SIGNS, INS/OUTS (last 24 hours):  --------------------------------------------------------------------------------------  ((Insert SICU Vitals / Is+Os here)) ***  --------------------------------------------------------------------------------------    EXAM  NEUROLOGY  RASS:   	GCS:    Exam: Normal, NAD, alert, oriented x 3, no focal deficits. ***    HEENT  Exam: Normocephalic, atraumatic.  EOMI ***    RESPIRATORY  Exam: Lungs clear to auscultation, Normal expansion/effort.  ***  Mechanical Ventilation:     CARDIOVASCULAR  Exam: S1, S2.  Regular rate and rhythm.  Peripheral edema  ***    GI/NUTRITION  Exam: Abdomen soft, Non-tender, Non-distended.  ***  Wound:   ***  Current Diet:  NPO ***    VASCULAR  Exam: Extremities warm, pink, well-perfused.  ***    MUSCULOSKELETAL  Exam: All extremities moving spontaneously without limitations.  ***    SKIN:  Exam: Good skin turgor, no skin breakdown.  ***    METABOLIC/FLUIDS/ELECTROLYTES  lactated ringers. 1000 milliLiter(s) IV Continuous <Continuous>      HEMATOLOGIC  [x] DVT Prophylaxis:   Transfusions:	[] PRBC	[] Platelets		[] FFP	[] Cryoprecipitate    INFECTIOUS DISEASE  Antimicrobials/Immunologic Medications:    Day #  	of    ***    Tubes/Lines/Drains  ***  [x] Peripheral IV  [] Central Venous Line     	[] R	[] L	[] IJ	[] Fem	[] SC	Date Placed:   [] Arterial Line		[] R	[] L	[] Fem	[] Rad	[] Ax	Date Placed:   [] PICC:         	[] Midline		[] Mediport  [] Urinary Catheter		Date Placed:     LABS  --------------------------------------------------------------------------------------  (([facility]))***  --------------------------------------------------------------------------------------    OTHER LABS    IMAGING RESULTS  Echo:   CT:   Xray:     ASSESSMENT:  62y Male ***    PLAN:  ***  Neurologic:   Respiratory:   Cardiovascular:   Gastrointestinal/Nutrition:   Renal/Genitourinary:   Hematologic:   Infectious Disease:   Tubes/Lines/Drains:   Endocrine:   Disposition:     --------------------------------------------------------------------------------------    Critical Care Diagnoses: SICU Consultation Note  =====================================================  HPI: 62y male POD#2 from C3-7 Lami/Fusion acutely hypotensive to systolic in 70s and bradycardic to 40-50s at 9am.  EKG done showing sinus fatemeh.  Pt given narcan and 1L NS bolus.  Pt received baclofen, gabapentin, losartan, ditropan, and triamterene/hctz at 6am.  Pt did not receive atenolol and has not been on CPAP in hospital.  Cardiology called and evaluated pt as well.      Allergies: shellfish (Rash; Anaphylaxis)    PAST MEDICAL & SURGICAL HISTORY:  BPH (benign prostatic hyperplasia)  OA (osteoarthritis of the spine): of right hip  Spinal stenosis in cervical region  Other intervertebral disc displacement, lumbar region  Spinal stenosis, lumbosacral region  Obesity (BMI 30.0-34.9)  HTN (hypertension)  CHRISSIE on CPAP  Back pain: chronic  H/O laminectomy: and fusion in Jan 2017  History of hip surgery: s/p right hip replacement    FAMILY HISTORY:  Family history of hypertension in mother (Mother)  Family history of Alzheimer's disease (Father)      SOCIAL HISTORY: Denies illicit drug use and tobacco use; ETOH 3-5 drinks occasionally on the weekend    ADVANCE DIRECTIVES: Presumed Full Code    REVIEW OF SYSTEMS:  General: Non-Contributory  Skin/Breast: Non-Contributory  Ophthalmologic: Non-Contributory  ENMT: Non-Contributory  Respiratory and Thorax: Non-Contributory  Cardiovascular: Non-Contributory  Gastrointestinal: Non-Contributory  Genitourinary: Non-Contributory  Musculoskeletal: c/o neck pain  Neurological: Non-Contributory  Psychiatric: Non-Contributory  Hematology/Lymphatics: Non-Contributory  Endocrine: Non-Contributory  Allergic/Immunologic: Non-Contributory    HOME MEDICATIONS:    	aspirin 81 mg oral tablet: Last Dose Taken:  , 1 tab(s) orally once a day last dose on 12/3/17  · 	baclofen 10 mg oral tablet: Last Dose Taken:  , 1 tab(s) orally 3 times a day  · 	atenolol 100 mg oral tablet: Last Dose Taken:  , 1 tab(s) orally once a day in am  · 	fenofibrate 134 mg oral capsule: Last Dose Taken:  , 1 cap(s) orally once a day in am  · 	gabapentin 800 mg oral tablet: Last Dose Taken:  , 1 tab(s) orally 2 times a day  · 	furosemide 20 mg oral tablet: Last Dose Taken:  , 1 tab(s) orally every other day  · 	triamterene-hydrochlorothiazide 37.5 mg-25 mg oral tablet: Last Dose Taken:  , 1 tab(s) orally once a day  · 	oxyCODONE 20 mg oral tablet: Last Dose Taken:  , 1 tab(s) orally every 6 hours, As Needed  · 	losartan 50 mg oral tablet: Last Dose Taken:  , 1 tab(s) orally 2 times a day  · 	Colace 100 mg oral capsule: Last Dose Taken:  , 1 cap(s) orally 2 times a day  · 	diclofenac sodium 75 mg oral delayed release tablet: Last Dose Taken:  , 1 tab(s) orally 2 times a day last dose on 12/5/17  · 	oxybutynin 10 mg/24 hr oral tablet, extended release: Last Dose Taken:  , 1 tab(s) orally once a day          · 	tamsulosin 0.4 mg oral capsule: Last Dose Taken:  , 1 cap(s) orally once a day (at bedtime)    CURRENT MEDICATIONS:   --------------------------------------------------------------------------------------  Neurologic Medications  acetaminophen   Tablet 650 milliGRAM(s) Oral every 6 hours PRN For Temp greater than 38 C (100.4 F)  acetaminophen   Tablet. 650 milliGRAM(s) Oral every 6 hours  baclofen 10 milliGRAM(s) Oral three times a day  gabapentin 800 milliGRAM(s) Oral two times a day  HYDROmorphone  Injectable 1 milliGRAM(s) IV Push every 10 minutes PRN Severe Pain (7 - 10)  HYDROmorphone  Injectable 1 milliGRAM(s) IV Push every 3 hours PRN Severe Pain (7 - 10)  ondansetron Injectable 4 milliGRAM(s) IV Push every 6 hours PRN Nausea  oxyCODONE    IR 5 milliGRAM(s) Oral every 3 hours PRN Moderate Pain (4 - 6)  oxyCODONE    IR 10 milliGRAM(s) Oral every 3 hours PRN Severe Pain (7 - 10)    Respiratory Medications  diphenhydrAMINE   Injectable 12.5 milliGRAM(s) IV Push every 4 hours PRN Itching and insomnia  diphenhydrAMINE   Injectable 25 milliGRAM(s) IV Push every 4 hours PRN Pruritus    Cardiovascular Medications  furosemide    Tablet 20 milliGRAM(s) Oral every other day  tamsulosin 0.4 milliGRAM(s) Oral at bedtime    Gastrointestinal Medications  aluminum hydroxide/magnesium hydroxide/simethicone Suspension 30 milliLiter(s) Oral every 12 hours PRN Indigestion  docusate sodium 100 milliGRAM(s) Oral two times a day  famotidine    Tablet 20 milliGRAM(s) Oral every 12 hours PRN Dyspepsia  lactated ringers. 1000 milliLiter(s) IV Continuous <Continuous>  magnesium hydroxide Suspension 30 milliLiter(s) Oral every 12 hours PRN Constipation  senna 2 Tablet(s) Oral at bedtime    Genitourinary Medications  oxybutynin 5 milliGRAM(s) Oral two times a day    Hematologic/Oncologic Medications    Antimicrobial/Immunologic Medications    Endocrine/Metabolic Medications  fenofibrate Tablet 145 milliGRAM(s) Oral daily    Topical/Other Medications  naloxone Injectable 0.1 milliGRAM(s) IV Push every 3 minutes PRN For ANY of the following changes in patient status:  A. RR LESS THAN 10 breaths per minute, B. Oxygen saturation LESS THAN 90%, C. Sedation score of 6  sodium chloride 0.9% lock flush 3 milliLiter(s) IV Push every 8 hours    --------------------------------------------------------------------------------------    VITAL SIGNS, INS/OUTS (last 24 hours):  --------------------------------------------------------------------------------------  T(C): 36.8 (12-09-17 @ 09:15), Max: 36.9 (12-09-17 @ 09:08)  HR: 51 (12-09-17 @ 09:15) (47 - 80)  BP: 97/51 (12-09-17 @ 09:15) (70/47 - 138/60)  BP(mean): --  ABP: --  ABP(mean): --  RR: 16 (12-09-17 @ 09:08) (16 - 18)  SpO2: 100% (12-09-17 @ 09:08) (99% - 100%)  Wt(kg): --  CVP(mm Hg): --  CI: --  CAPILLARY BLOOD GLUCOSE      POCT Blood Glucose.: 110 mg/dL (09 Dec 2017 09:24)   N/A      12-08 @ 07:01  -  12-09 @ 07:00  --------------------------------------------------------  IN:  Total IN: 0 mL    OUT:    Accordian: 182.5 mL    Indwelling Catheter - Urethral: 600 mL    Voided: 2275 mL  Total OUT: 3057.5 mL    Total NET: -3057.5 mL        --------------------------------------------------------------------------------------    EXAM  NEUROLOGY  RASS: 0  Exam: snoring/lethargic but A&O  x 3, no focal deficits    HEENT  Exam: Normocephalic, atraumatic.  EOMI.  Cervical collar in place.  Hemovac sang    RESPIRATORY  Exam: Lungs clear to auscultation    CARDIOVASCULAR  Exam: S1, S2.  bradycardic and regular rhythm.  No Peripheral edema    GI/NUTRITION  Exam: Abdomen soft, Non-tender, Non-distended.   Current Diet: Regular DASH    VASCULAR  Exam: Extremities warm, well-perfused. Palp DP b/l    MUSCULOSKELETAL  Exam: All extremities moving spontaneously without limitations. Strength intact, slightly weaker LUE than RUE    SKIN:  Exam: Good skin turgor, no skin breakdown.    METABOLIC/FLUIDS/ELECTROLYTES  lactated ringers. 1000 milliLiter(s) IV Continuous <Continuous>      HEMATOLOGIC  [x] DVT Prophylaxis: venodynes  Transfusions:	[] PRBC	[] Platelets		[] FFP	[] Cryoprecipitate      Tubes/Lines/Drains   [x] Peripheral IV  [] Central Venous Line     	[] R	[] L	[] IJ	[] Fem	[] SC	Date Placed:   [] Arterial Line		[] R	[] L	[] Fem	[] Rad	[] Ax	Date Placed:   [] PICC:         	[] Midline		[] Mediport  [] Urinary Catheter		Date Placed:     LABS  --------------------------------------------------------------------------------------  CBC (12-09 @ 10:46)                              9.6<L>                         9.47    )----------------(  237        --    % Neutrophils, --    % Lymphocytes, ANC: --                                  30.3<L>              CBC (12-09 @ 05:18)                              9.8<L>                         10.26   )----------------(  236        70.7  % Neutrophils, 20.7  % Lymphocytes, ANC: 7.26                                31.0<L>                BMP (12-09 @ 05:18)             141     |  103     |  22    		Ca++ --      Ca 8.5                ---------------------------------( 97    		Mg --                 4.0     |  26      |  1.34<H>			Ph --      BMP (12-08 @ 06:00)             137     |  100     |  19    		Ca++ --      Ca 8.5                ---------------------------------( 128<H>		Mg --                 4.6     |  26      |  1.51<H>			Ph --            ABG (12-09 @ 10:46)     7.46<H> / 36 / 148<H> / 27<H> / 2.1 / 99.3<H>%     Lactate: 1.5          --------------------------------------------------------------------------------------    OTHER LABS    IMAGING RESULTS  Echo: pending  CTH: pending    ASSESSMENT:  62y Male s/p C3-7 lami/fusion    PLAN:  Neurologic: limit narcotic pain medication, f/u head CT  Respiratory: Resume pt home CPAP at night  Cardiovascular: holding antihypertensives, resume per cards recs, echo  Gastrointestinal/Nutrition: Diet as tolerated  Renal/Genitourinary: Strict I&0, IVL when re reg diet  Hematologic: rec chemical VTE ppx given stable CBC  Infectious Disease: stable  Tubes/Lines/Drains: PIV  Endocrine: stable  Disposition: Floor with CPAP at night SICU Consultation Note  =====================================================  HPI: 62y male POD#2 from C3-7 Lami/Fusion acutely hypotensive to systolic in 70s and bradycardic to 40-50s at 9am.  EKG done showing sinus fatemeh.  Pt given narcan and 1L NS bolus.  Pt received baclofen, gabapentin, losartan, ditropan, and triamterene/hctz at 6am.  Pt did not receive atenolol and has not been on CPAP in hospital.  Cardiology called and evaluated pt as well.      Allergies: shellfish (Rash; Anaphylaxis)    PAST MEDICAL & SURGICAL HISTORY:  BPH (benign prostatic hyperplasia)  OA (osteoarthritis of the spine): of right hip  Spinal stenosis in cervical region  Other intervertebral disc displacement, lumbar region  Spinal stenosis, lumbosacral region  Obesity (BMI 30.0-34.9)  HTN (hypertension)  CHRISSIE on CPAP  Back pain: chronic  H/O laminectomy: and fusion in Jan 2017  History of hip surgery: s/p right hip replacement    FAMILY HISTORY:  Family history of hypertension in mother (Mother)  Family history of Alzheimer's disease (Father)      SOCIAL HISTORY: Denies illicit drug use and tobacco use; ETOH 3-5 drinks occasionally on the weekend    ADVANCE DIRECTIVES: Presumed Full Code    REVIEW OF SYSTEMS:  General: Non-Contributory  Skin/Breast: Non-Contributory  Ophthalmologic: Non-Contributory  ENMT: Non-Contributory  Respiratory and Thorax: Non-Contributory  Cardiovascular: Non-Contributory  Gastrointestinal: Non-Contributory  Genitourinary: Non-Contributory  Musculoskeletal: c/o neck pain  Neurological: Non-Contributory  Psychiatric: Non-Contributory  Hematology/Lymphatics: Non-Contributory  Endocrine: Non-Contributory  Allergic/Immunologic: Non-Contributory    HOME MEDICATIONS:    	aspirin 81 mg oral tablet: Last Dose Taken:  , 1 tab(s) orally once a day last dose on 12/3/17  · 	baclofen 10 mg oral tablet: Last Dose Taken:  , 1 tab(s) orally 3 times a day  · 	atenolol 100 mg oral tablet: Last Dose Taken:  , 1 tab(s) orally once a day in am  · 	fenofibrate 134 mg oral capsule: Last Dose Taken:  , 1 cap(s) orally once a day in am  · 	gabapentin 800 mg oral tablet: Last Dose Taken:  , 1 tab(s) orally 2 times a day  · 	furosemide 20 mg oral tablet: Last Dose Taken:  , 1 tab(s) orally every other day  · 	triamterene-hydrochlorothiazide 37.5 mg-25 mg oral tablet: Last Dose Taken:  , 1 tab(s) orally once a day  · 	oxyCODONE 20 mg oral tablet: Last Dose Taken:  , 1 tab(s) orally every 6 hours, As Needed  · 	losartan 50 mg oral tablet: Last Dose Taken:  , 1 tab(s) orally 2 times a day  · 	Colace 100 mg oral capsule: Last Dose Taken:  , 1 cap(s) orally 2 times a day  · 	diclofenac sodium 75 mg oral delayed release tablet: Last Dose Taken:  , 1 tab(s) orally 2 times a day last dose on 12/5/17  · 	oxybutynin 10 mg/24 hr oral tablet, extended release: Last Dose Taken:  , 1 tab(s) orally once a day          · 	tamsulosin 0.4 mg oral capsule: Last Dose Taken:  , 1 cap(s) orally once a day (at bedtime)    CURRENT MEDICATIONS:   --------------------------------------------------------------------------------------  Neurologic Medications  acetaminophen   Tablet 650 milliGRAM(s) Oral every 6 hours PRN For Temp greater than 38 C (100.4 F)  acetaminophen   Tablet. 650 milliGRAM(s) Oral every 6 hours  baclofen 10 milliGRAM(s) Oral three times a day  gabapentin 800 milliGRAM(s) Oral two times a day  HYDROmorphone  Injectable 1 milliGRAM(s) IV Push every 10 minutes PRN Severe Pain (7 - 10)  HYDROmorphone  Injectable 1 milliGRAM(s) IV Push every 3 hours PRN Severe Pain (7 - 10)  ondansetron Injectable 4 milliGRAM(s) IV Push every 6 hours PRN Nausea  oxyCODONE    IR 5 milliGRAM(s) Oral every 3 hours PRN Moderate Pain (4 - 6)  oxyCODONE    IR 10 milliGRAM(s) Oral every 3 hours PRN Severe Pain (7 - 10)    Respiratory Medications  diphenhydrAMINE   Injectable 12.5 milliGRAM(s) IV Push every 4 hours PRN Itching and insomnia  diphenhydrAMINE   Injectable 25 milliGRAM(s) IV Push every 4 hours PRN Pruritus    Cardiovascular Medications  furosemide    Tablet 20 milliGRAM(s) Oral every other day  tamsulosin 0.4 milliGRAM(s) Oral at bedtime    Gastrointestinal Medications  aluminum hydroxide/magnesium hydroxide/simethicone Suspension 30 milliLiter(s) Oral every 12 hours PRN Indigestion  docusate sodium 100 milliGRAM(s) Oral two times a day  famotidine    Tablet 20 milliGRAM(s) Oral every 12 hours PRN Dyspepsia  lactated ringers. 1000 milliLiter(s) IV Continuous <Continuous>  magnesium hydroxide Suspension 30 milliLiter(s) Oral every 12 hours PRN Constipation  senna 2 Tablet(s) Oral at bedtime    Genitourinary Medications  oxybutynin 5 milliGRAM(s) Oral two times a day    Hematologic/Oncologic Medications    Antimicrobial/Immunologic Medications    Endocrine/Metabolic Medications  fenofibrate Tablet 145 milliGRAM(s) Oral daily    Topical/Other Medications  naloxone Injectable 0.1 milliGRAM(s) IV Push every 3 minutes PRN For ANY of the following changes in patient status:  A. RR LESS THAN 10 breaths per minute, B. Oxygen saturation LESS THAN 90%, C. Sedation score of 6  sodium chloride 0.9% lock flush 3 milliLiter(s) IV Push every 8 hours    --------------------------------------------------------------------------------------    VITAL SIGNS, INS/OUTS (last 24 hours):  --------------------------------------------------------------------------------------  T(C): 36.8 (12-09-17 @ 09:15), Max: 36.9 (12-09-17 @ 09:08)  HR: 51 (12-09-17 @ 09:15) (47 - 80)  BP: 97/51 (12-09-17 @ 09:15) (70/47 - 138/60)  BP(mean): --  ABP: --  ABP(mean): --  RR: 16 (12-09-17 @ 09:08) (16 - 18)  SpO2: 100% (12-09-17 @ 09:08) (99% - 100%)  Wt(kg): --  CVP(mm Hg): --  CI: --  CAPILLARY BLOOD GLUCOSE      POCT Blood Glucose.: 110 mg/dL (09 Dec 2017 09:24)   N/A      12-08 @ 07:01  -  12-09 @ 07:00  --------------------------------------------------------  IN:  Total IN: 0 mL    OUT:    Accordian: 182.5 mL    Indwelling Catheter - Urethral: 600 mL    Voided: 2275 mL  Total OUT: 3057.5 mL    Total NET: -3057.5 mL        --------------------------------------------------------------------------------------    EXAM  NEUROLOGY  RASS: 0  Exam: snoring/lethargic but A&O  x 3, no focal deficits    HEENT  Exam: Normocephalic, atraumatic.  EOMI.  Cervical collar in place.  Hemovac sang    RESPIRATORY  Exam: Lungs clear to auscultation    CARDIOVASCULAR  Exam: S1, S2.  bradycardic and regular rhythm.  No Peripheral edema    GI/NUTRITION  Exam: Abdomen soft, Non-tender, Non-distended.   Current Diet: Regular DASH    VASCULAR  Exam: Extremities warm, well-perfused. Palp DP b/l    MUSCULOSKELETAL  Exam: All extremities moving spontaneously without limitations. Strength intact, slightly weaker LUE than RUE    SKIN:  Exam: Good skin turgor, no skin breakdown.    METABOLIC/FLUIDS/ELECTROLYTES  lactated ringers. 1000 milliLiter(s) IV Continuous <Continuous>      HEMATOLOGIC  [x] DVT Prophylaxis: venodynes  Transfusions:	[] PRBC	[] Platelets		[] FFP	[] Cryoprecipitate      Tubes/Lines/Drains   [x] Peripheral IV  [] Central Venous Line     	[] R	[] L	[] IJ	[] Fem	[] SC	Date Placed:   [] Arterial Line		[] R	[] L	[] Fem	[] Rad	[] Ax	Date Placed:   [] PICC:         	[] Midline		[] Mediport  [] Urinary Catheter		Date Placed:     LABS  --------------------------------------------------------------------------------------  CBC (12-09 @ 10:46)                              9.6<L>                         9.47    )----------------(  237        --    % Neutrophils, --    % Lymphocytes, ANC: --                                  30.3<L>              CBC (12-09 @ 05:18)                              9.8<L>                         10.26   )----------------(  236        70.7  % Neutrophils, 20.7  % Lymphocytes, ANC: 7.26                                31.0<L>                BMP (12-09 @ 05:18)             141     |  103     |  22    		Ca++ --      Ca 8.5                ---------------------------------( 97    		Mg --                 4.0     |  26      |  1.34<H>			Ph --      BMP (12-08 @ 06:00)             137     |  100     |  19    		Ca++ --      Ca 8.5                ---------------------------------( 128<H>		Mg --                 4.6     |  26      |  1.51<H>			Ph --            ABG (12-09 @ 10:46)     7.46<H> / 36 / 148<H> / 27<H> / 2.1 / 99.3<H>%     Lactate: 1.5          --------------------------------------------------------------------------------------    OTHER LABS    IMAGING RESULTS  Echo: pending  CTH: pending    ASSESSMENT:  62y Male s/p C3-7 lami/fusion    PLAN:  Neurologic: limit narcotic pain medication, f/u head CT  Respiratory: Resume pt home CPAP at night  Cardiovascular: holding antihypertensives, resume per cards recs, echo  Gastrointestinal/Nutrition: Diet as tolerated  Renal/Genitourinary: Strict I&0, c/w LR @ 150  Hematologic: rec chemical VTE ppx given stable CBC  Infectious Disease: stable  Tubes/Lines/Drains: PIV  Endocrine: stable  Disposition: Floor with CPAP at night

## 2017-12-09 NOTE — PROVIDER CONTACT NOTE (CHANGE IN STATUS NOTIFICATION) - RECOMMENDATIONS
V/S, EKG, FS, fluid bolus, cardiology consult, hold  Atenolol Back to bed, Trendelenburg's, Fluid bolus, V/S, EKG, FS, Cardiology consult, hold  Atenolol

## 2017-12-10 LAB
BASOPHILS # BLD AUTO: 0.04 K/UL — SIGNIFICANT CHANGE UP (ref 0–0.2)
BASOPHILS NFR BLD AUTO: 0.4 % — SIGNIFICANT CHANGE UP (ref 0–2)
BUN SERPL-MCNC: 16 MG/DL — SIGNIFICANT CHANGE UP (ref 7–23)
CALCIUM SERPL-MCNC: 8.5 MG/DL — SIGNIFICANT CHANGE UP (ref 8.4–10.5)
CHLORIDE SERPL-SCNC: 103 MMOL/L — SIGNIFICANT CHANGE UP (ref 98–107)
CO2 SERPL-SCNC: 28 MMOL/L — SIGNIFICANT CHANGE UP (ref 22–31)
CREAT SERPL-MCNC: 1.08 MG/DL — SIGNIFICANT CHANGE UP (ref 0.5–1.3)
EOSINOPHIL # BLD AUTO: 0.2 K/UL — SIGNIFICANT CHANGE UP (ref 0–0.5)
EOSINOPHIL NFR BLD AUTO: 2.2 % — SIGNIFICANT CHANGE UP (ref 0–6)
GLUCOSE SERPL-MCNC: 97 MG/DL — SIGNIFICANT CHANGE UP (ref 70–99)
HCT VFR BLD CALC: 30 % — LOW (ref 39–50)
HGB BLD-MCNC: 9.5 G/DL — LOW (ref 13–17)
IMM GRANULOCYTES # BLD AUTO: 0.04 # — SIGNIFICANT CHANGE UP
IMM GRANULOCYTES NFR BLD AUTO: 0.4 % — SIGNIFICANT CHANGE UP (ref 0–1.5)
LYMPHOCYTES # BLD AUTO: 1.98 K/UL — SIGNIFICANT CHANGE UP (ref 1–3.3)
LYMPHOCYTES # BLD AUTO: 21.5 % — SIGNIFICANT CHANGE UP (ref 13–44)
MCHC RBC-ENTMCNC: 26 PG — LOW (ref 27–34)
MCHC RBC-ENTMCNC: 31.7 % — LOW (ref 32–36)
MCV RBC AUTO: 82.2 FL — SIGNIFICANT CHANGE UP (ref 80–100)
MONOCYTES # BLD AUTO: 0.74 K/UL — SIGNIFICANT CHANGE UP (ref 0–0.9)
MONOCYTES NFR BLD AUTO: 8 % — SIGNIFICANT CHANGE UP (ref 2–14)
NEUTROPHILS # BLD AUTO: 6.23 K/UL — SIGNIFICANT CHANGE UP (ref 1.8–7.4)
NEUTROPHILS NFR BLD AUTO: 67.5 % — SIGNIFICANT CHANGE UP (ref 43–77)
NRBC # FLD: 0 — SIGNIFICANT CHANGE UP
PLATELET # BLD AUTO: 227 K/UL — SIGNIFICANT CHANGE UP (ref 150–400)
PMV BLD: 10.4 FL — SIGNIFICANT CHANGE UP (ref 7–13)
POTASSIUM SERPL-MCNC: 3.9 MMOL/L — SIGNIFICANT CHANGE UP (ref 3.5–5.3)
POTASSIUM SERPL-SCNC: 3.9 MMOL/L — SIGNIFICANT CHANGE UP (ref 3.5–5.3)
RBC # BLD: 3.65 M/UL — LOW (ref 4.2–5.8)
RBC # FLD: 14.5 % — SIGNIFICANT CHANGE UP (ref 10.3–14.5)
SODIUM SERPL-SCNC: 141 MMOL/L — SIGNIFICANT CHANGE UP (ref 135–145)
WBC # BLD: 9.23 K/UL — SIGNIFICANT CHANGE UP (ref 3.8–10.5)
WBC # FLD AUTO: 9.23 K/UL — SIGNIFICANT CHANGE UP (ref 3.8–10.5)

## 2017-12-10 PROCEDURE — 99233 SBSQ HOSP IP/OBS HIGH 50: CPT

## 2017-12-10 RX ADMIN — OXYCODONE HYDROCHLORIDE 10 MILLIGRAM(S): 5 TABLET ORAL at 11:33

## 2017-12-10 RX ADMIN — Medication 650 MILLIGRAM(S): at 23:08

## 2017-12-10 RX ADMIN — Medication 650 MILLIGRAM(S): at 00:32

## 2017-12-10 RX ADMIN — SODIUM CHLORIDE 3 MILLILITER(S): 9 INJECTION INTRAMUSCULAR; INTRAVENOUS; SUBCUTANEOUS at 21:14

## 2017-12-10 RX ADMIN — OXYCODONE HYDROCHLORIDE 10 MILLIGRAM(S): 5 TABLET ORAL at 22:00

## 2017-12-10 RX ADMIN — Medication 10 MILLIGRAM(S): at 06:26

## 2017-12-10 RX ADMIN — Medication 650 MILLIGRAM(S): at 06:27

## 2017-12-10 RX ADMIN — TAMSULOSIN HYDROCHLORIDE 0.4 MILLIGRAM(S): 0.4 CAPSULE ORAL at 21:13

## 2017-12-10 RX ADMIN — Medication 100 MILLIGRAM(S): at 06:26

## 2017-12-10 RX ADMIN — Medication 650 MILLIGRAM(S): at 11:33

## 2017-12-10 RX ADMIN — Medication 5 MILLIGRAM(S): at 06:26

## 2017-12-10 RX ADMIN — Medication 145 MILLIGRAM(S): at 21:14

## 2017-12-10 RX ADMIN — SODIUM CHLORIDE 3 MILLILITER(S): 9 INJECTION INTRAMUSCULAR; INTRAVENOUS; SUBCUTANEOUS at 14:01

## 2017-12-10 RX ADMIN — GABAPENTIN 800 MILLIGRAM(S): 400 CAPSULE ORAL at 06:26

## 2017-12-10 RX ADMIN — Medication 650 MILLIGRAM(S): at 17:28

## 2017-12-10 RX ADMIN — Medication 5 MILLIGRAM(S): at 17:28

## 2017-12-10 RX ADMIN — Medication 100 MILLIGRAM(S): at 17:29

## 2017-12-10 RX ADMIN — SODIUM CHLORIDE 3 MILLILITER(S): 9 INJECTION INTRAMUSCULAR; INTRAVENOUS; SUBCUTANEOUS at 06:27

## 2017-12-10 RX ADMIN — GABAPENTIN 800 MILLIGRAM(S): 400 CAPSULE ORAL at 17:28

## 2017-12-10 RX ADMIN — SENNA PLUS 2 TABLET(S): 8.6 TABLET ORAL at 21:13

## 2017-12-10 RX ADMIN — OXYCODONE HYDROCHLORIDE 10 MILLIGRAM(S): 5 TABLET ORAL at 12:20

## 2017-12-10 RX ADMIN — OXYCODONE HYDROCHLORIDE 10 MILLIGRAM(S): 5 TABLET ORAL at 21:13

## 2017-12-10 RX ADMIN — Medication 10 MILLIGRAM(S): at 21:13

## 2017-12-10 RX ADMIN — Medication 10 MILLIGRAM(S): at 14:03

## 2017-12-10 NOTE — PROGRESS NOTE ADULT - SUBJECTIVE AND OBJECTIVE BOX
Yesterday morning patient noted to be altered and with fatemeh/hypotension.   SICU/Cards consulted and patient's anti-htn discontinued. ABG wnl. Patient spontanesouly improved throughout the day with fluid rescuscitation. Upon further questioning patient reported use of CPAP at home and patient was started on CPAP overnight. Pain well controlled with pain medications.    T(C): 36.4 (12-10-17 @ 01:13), Max: 37 (12-09-17 @ 21:58)  HR: 65 (12-10-17 @ 03:40) (47 - 74)  BP: 128/62 (12-10-17 @ 01:13) (70/47 - 141/47)  RR: 16 (12-10-17 @ 01:13) (16 - 17)  SpO2: 100% (12-10-17 @ 03:40) (97% - 100%)  Wt(kg): --                          9.6    9.47  )-----------( 237      ( 09 Dec 2017 10:46 )             30.3     12-09    141  |  103  |  22  ----------------------------<  97  4.0   |  26  |  1.34<H>    Ca    8.5      09 Dec 2017 05:18    Exam:  Gen: NAD  Motor: 4/5 AIN/PIN/Median/Radial/Ulnar nerve distributions  Sensory: SILT Median/Radial/Ulnar nerve distributions  Vascular: 2+ Radial pulse  Dressing: Clean, Dry, Intact, HMV intact w serosang output    A/P: 62 year old male s/p C3-7 Lami/Fusion  - Pain Control  - Regular Diet  - PT/OT, OOB  - Aspen collar  - Discharge Planning

## 2017-12-10 NOTE — PROGRESS NOTE ADULT - SUBJECTIVE AND OBJECTIVE BOX
Subjective: Patient seen and examined. No new events except as noted.     SUBJECTIVE/ROS:  feels better  No chest pain, or sob.   awake and alert       MEDICATIONS:  MEDICATIONS  (STANDING):  acetaminophen   Tablet. 650 milliGRAM(s) Oral every 6 hours  baclofen 10 milliGRAM(s) Oral three times a day  docusate sodium 100 milliGRAM(s) Oral two times a day  fenofibrate Tablet 145 milliGRAM(s) Oral daily  furosemide    Tablet 20 milliGRAM(s) Oral every other day  gabapentin 800 milliGRAM(s) Oral two times a day  lactated ringers. 1000 milliLiter(s) (150 mL/Hr) IV Continuous <Continuous>  oxybutynin 5 milliGRAM(s) Oral two times a day  senna 2 Tablet(s) Oral at bedtime  sodium chloride 0.9% lock flush 3 milliLiter(s) IV Push every 8 hours  tamsulosin 0.4 milliGRAM(s) Oral at bedtime      PHYSICAL EXAM:  T(C): 37.2 (12-10-17 @ 09:41), Max: 37.2 (12-10-17 @ 09:41)  HR: 71 (12-10-17 @ 09:41) (57 - 74)  BP: 132/66 (12-10-17 @ 09:41) (114/70 - 141/47)  RR: 16 (12-10-17 @ 09:41) (16 - 17)  SpO2: 99% (12-10-17 @ 09:41) (97% - 100%)  Wt(kg): --  I&O's Summary    09 Dec 2017 07:01  -  10 Dec 2017 07:00  --------------------------------------------------------  IN: 3070 mL / OUT: 4297.5 mL / NET: -1227.5 mL    10 Dec 2017 07:01  -  10 Dec 2017 10:17  --------------------------------------------------------  IN: 0 mL / OUT: 400 mL / NET: -400 mL          Appearance: Normal	  HEENT:   Normal oral mucosa, PERRL, EOMI	  Cardiovascular: Normal S1 S2,    Murmur:   Neck: JVP normal  Respiratory: Lungs clear to auscultation  Gastrointestinal:  Soft, Non-tender, + BS	  Skin: normal   Neuro: No gross deficits.   Psychiatry:  Mood & affect appropriate  Ext: No edema      LABS/DATA:    CARDIAC MARKERS:                                9.5    9.23  )-----------( 227      ( 10 Dec 2017 06:38 )             30.0     12-10    141  |  103  |  16  ----------------------------<  97  3.9   |  28  |  1.08    Ca    8.5      10 Dec 2017 06:38      proBNP:   Lipid Profile:   HgA1c:   TSH:     TELE:  EKG:

## 2017-12-10 NOTE — PROGRESS NOTE ADULT - SUBJECTIVE AND OBJECTIVE BOX
Patient is a 62y old  Male who presents with a chief complaint of Patient is now s/p C3-C7 laminectomy and fusion on 12/7/17 (08 Dec 2017 10:52)    SUBJECTIVE / OVERNIGHT EVENTS:  Pt seen sitting up in chair, says he's doing better.  Tolerated CPAP while sleeping.  No chest pain, SOB.  Tolerate PO.    MEDICATIONS  (STANDING):  acetaminophen   Tablet. 650 milliGRAM(s) Oral every 6 hours  baclofen 10 milliGRAM(s) Oral three times a day  docusate sodium 100 milliGRAM(s) Oral two times a day  fenofibrate Tablet 145 milliGRAM(s) Oral daily  furosemide    Tablet 20 milliGRAM(s) Oral every other day  gabapentin 800 milliGRAM(s) Oral two times a day  lactated ringers. 1000 milliLiter(s) (150 mL/Hr) IV Continuous <Continuous>  oxybutynin 5 milliGRAM(s) Oral two times a day  senna 2 Tablet(s) Oral at bedtime  sodium chloride 0.9% lock flush 3 milliLiter(s) IV Push every 8 hours  tamsulosin 0.4 milliGRAM(s) Oral at bedtime    MEDICATIONS  (PRN):  acetaminophen   Tablet 650 milliGRAM(s) Oral every 6 hours PRN For Temp greater than 38 C (100.4 F)  aluminum hydroxide/magnesium hydroxide/simethicone Suspension 30 milliLiter(s) Oral every 12 hours PRN Indigestion  diphenhydrAMINE   Injectable 12.5 milliGRAM(s) IV Push every 4 hours PRN Itching and insomnia  diphenhydrAMINE   Injectable 25 milliGRAM(s) IV Push every 4 hours PRN Pruritus  famotidine    Tablet 20 milliGRAM(s) Oral every 12 hours PRN Dyspepsia  HYDROmorphone  Injectable 1 milliGRAM(s) IV Push every 10 minutes PRN Severe Pain (7 - 10)  HYDROmorphone  Injectable 1 milliGRAM(s) IV Push every 3 hours PRN Severe Pain (7 - 10)  magnesium hydroxide Suspension 30 milliLiter(s) Oral every 12 hours PRN Constipation  naloxone Injectable 0.1 milliGRAM(s) IV Push every 3 minutes PRN For ANY of the following changes in patient status:  A. RR LESS THAN 10 breaths per minute, B. Oxygen saturation LESS THAN 90%, C. Sedation score of 6  ondansetron Injectable 4 milliGRAM(s) IV Push every 6 hours PRN Nausea  oxyCODONE    IR 5 milliGRAM(s) Oral every 3 hours PRN Moderate Pain (4 - 6)  oxyCODONE    IR 10 milliGRAM(s) Oral every 3 hours PRN Severe Pain (7 - 10)    Vital Signs Last 24 Hrs  T(C): 36.6 (10 Dec 2017 17:15), Max: 37.2 (10 Dec 2017 09:41)  T(F): 97.8 (10 Dec 2017 17:15), Max: 98.9 (10 Dec 2017 09:41)  HR: 68 (10 Dec 2017 17:15) (65 - 82)  BP: 131/59 (10 Dec 2017 17:15) (114/70 - 132/66)  RR: 17 (10 Dec 2017 17:15) (16 - 17)  SpO2: 99% (10 Dec 2017 17:15) (96% - 100%)    PHYSICAL EXAM:  GENERAL: NAD, well-developed, sitting up in chair  HEAD:  Atraumatic, Normocephalic  EYES: EOMI, PERRLA, conjunctiva and sclera clear  NECK: wearing collar  CHEST/LUNG: Clear to auscultation bilaterally; No wheeze  HEART: Regular rate and rhythm; No murmurs, rubs, or gallops  ABDOMEN: Soft, Nontender, Nondistended; Bowel sounds present  EXTREMITIES:  2+ Peripheral Pulses, No clubbing, cyanosis, or edema  PSYCH: AAOx3  NEUROLOGY: non-focal  SKIN: No rashes or lesions    LABS:             9.5    9.23  )-----------( 227      ( 10 Dec 2017 06:38 )             30.0     141  |  103  |  16  ----------------------------<  97  3.9   |  28  |  1.08    Ca    8.5      10 Dec 2017 06:38    RADIOLOGY & ADDITIONAL TESTS:    Imaging Personally Reviewed:    Consultant(s) Notes Reviewed:      Care Discussed with Consultants/Other Providers: ortho re overall care

## 2017-12-11 ENCOUNTER — TRANSCRIPTION ENCOUNTER (OUTPATIENT)
Age: 62
End: 2017-12-11

## 2017-12-11 VITALS
DIASTOLIC BLOOD PRESSURE: 66 MMHG | TEMPERATURE: 98 F | OXYGEN SATURATION: 99 % | SYSTOLIC BLOOD PRESSURE: 118 MMHG | RESPIRATION RATE: 18 BRPM | HEART RATE: 82 BPM

## 2017-12-11 DIAGNOSIS — M48.00 SPINAL STENOSIS, SITE UNSPECIFIED: ICD-10-CM

## 2017-12-11 DIAGNOSIS — R55 SYNCOPE AND COLLAPSE: ICD-10-CM

## 2017-12-11 PROCEDURE — 99233 SBSQ HOSP IP/OBS HIGH 50: CPT

## 2017-12-11 RX ORDER — ATENOLOL 25 MG/1
1 TABLET ORAL
Qty: 0 | Refills: 0 | COMMUNITY

## 2017-12-11 RX ORDER — OXYCODONE HYDROCHLORIDE 5 MG/1
1 TABLET ORAL
Qty: 0 | Refills: 0 | COMMUNITY

## 2017-12-11 RX ORDER — BACLOFEN 100 %
1 POWDER (GRAM) MISCELLANEOUS
Qty: 0 | Refills: 0 | COMMUNITY

## 2017-12-11 RX ORDER — FUROSEMIDE 40 MG
1 TABLET ORAL
Qty: 0 | Refills: 0 | COMMUNITY

## 2017-12-11 RX ORDER — BACLOFEN 100 %
1 POWDER (GRAM) MISCELLANEOUS
Qty: 21 | Refills: 0 | OUTPATIENT
Start: 2017-12-11 | End: 2017-12-17

## 2017-12-11 RX ORDER — LOSARTAN POTASSIUM 100 MG/1
1 TABLET, FILM COATED ORAL
Qty: 0 | Refills: 0 | COMMUNITY

## 2017-12-11 RX ORDER — FAMOTIDINE 10 MG/ML
1 INJECTION INTRAVENOUS
Qty: 60 | Refills: 0 | OUTPATIENT
Start: 2017-12-11 | End: 2018-01-09

## 2017-12-11 RX ORDER — FUROSEMIDE 40 MG
1 TABLET ORAL
Qty: 0 | Refills: 0 | COMMUNITY
Start: 2017-12-11

## 2017-12-11 RX ORDER — TRIAMTERENE/HYDROCHLOROTHIAZID 75 MG-50MG
1 TABLET ORAL
Qty: 0 | Refills: 0 | COMMUNITY

## 2017-12-11 RX ORDER — OXYCODONE HYDROCHLORIDE 5 MG/1
1 TABLET ORAL
Qty: 60 | Refills: 0 | OUTPATIENT
Start: 2017-12-11 | End: 2017-12-17

## 2017-12-11 RX ORDER — SENNA PLUS 8.6 MG/1
2 TABLET ORAL
Qty: 10 | Refills: 0 | OUTPATIENT
Start: 2017-12-11 | End: 2017-12-15

## 2017-12-11 RX ADMIN — Medication 10 MILLIGRAM(S): at 05:56

## 2017-12-11 RX ADMIN — Medication 10 MILLIGRAM(S): at 14:16

## 2017-12-11 RX ADMIN — Medication 650 MILLIGRAM(S): at 11:53

## 2017-12-11 RX ADMIN — OXYCODONE HYDROCHLORIDE 10 MILLIGRAM(S): 5 TABLET ORAL at 04:20

## 2017-12-11 RX ADMIN — Medication 5 MILLIGRAM(S): at 05:56

## 2017-12-11 RX ADMIN — Medication 100 MILLIGRAM(S): at 05:56

## 2017-12-11 RX ADMIN — SODIUM CHLORIDE 3 MILLILITER(S): 9 INJECTION INTRAMUSCULAR; INTRAVENOUS; SUBCUTANEOUS at 05:57

## 2017-12-11 RX ADMIN — GABAPENTIN 800 MILLIGRAM(S): 400 CAPSULE ORAL at 05:56

## 2017-12-11 RX ADMIN — SODIUM CHLORIDE 3 MILLILITER(S): 9 INJECTION INTRAMUSCULAR; INTRAVENOUS; SUBCUTANEOUS at 14:15

## 2017-12-11 RX ADMIN — Medication 650 MILLIGRAM(S): at 05:56

## 2017-12-11 RX ADMIN — OXYCODONE HYDROCHLORIDE 10 MILLIGRAM(S): 5 TABLET ORAL at 03:32

## 2017-12-11 NOTE — PROGRESS NOTE ADULT - PROBLEM SELECTOR PROBLEM 5
Anemia due to blood loss
CKD (chronic kidney disease) stage 4, GFR 15-29 ml/min
CKD (chronic kidney disease) stage 4, GFR 15-29 ml/min

## 2017-12-11 NOTE — PROGRESS NOTE ADULT - PROBLEM SELECTOR PROBLEM 6
Leukocytosis, unspecified type
S/P laminectomy with spinal fusion
S/P laminectomy with spinal fusion

## 2017-12-11 NOTE — PROGRESS NOTE ADULT - PROBLEM SELECTOR PLAN 3
Likely due to post-op blodd loss.  Monitor H/h.  no indication for PRBC transfusion today.
stable, Likely due to post-op blood loss.  Monitor H/h.  no indication for PRBC transfusion today.
Likely related to post-op pain, pain meds, and lack of compliance with CPAP.  Cardiology input appreciated.

## 2017-12-11 NOTE — PROGRESS NOTE ADULT - PROBLEM SELECTOR PLAN 6
No clinical signs of infection.  Likely post-op reactive leukocytosis. --> has normalized
post op management per ortho, DVT ppx (foot pumps), IS, mobilize as appropriate
post op management per ortho, DVT ppx (foot pumps), IS, mobilize as appropriate

## 2017-12-11 NOTE — PROGRESS NOTE ADULT - PROVIDER SPECIALTY LIST ADULT
Anesthesia
Cardiology
Cardiology
Hospitalist
Hospitalist
Orthopedics
Pain Medicine
Pain Medicine
Hospitalist

## 2017-12-11 NOTE — PROGRESS NOTE ADULT - SUBJECTIVE AND OBJECTIVE BOX
No acute events overnight  Pain well controlled with pain medications  Working well with PT    T(C): 36.4 (12-11-17 @ 05:54), Max: 37.2 (12-10-17 @ 09:41)  HR: 67 (12-11-17 @ 05:54) (64 - 82)  BP: 129/59 (12-11-17 @ 05:54) (114/74 - 132/66)  RR: 17 (12-11-17 @ 05:54) (16 - 18)  SpO2: 97% (12-11-17 @ 05:54) (96% - 100%)  Wt(kg): --                          9.5    9.23  )-----------( 227      ( 10 Dec 2017 06:38 )             30.0     12-10    141  |  103  |  16  ----------------------------<  97  3.9   |  28  |  1.08    Ca    8.5      10 Dec 2017 06:38      Exam:  Gen: NAD  Motor: 4/5 AIN/PIN/Median/Radial/Ulnar nerve distributions  Sensory: SILT Median/Radial/Ulnar nerve distributions  Vascular: 2+ Radial pulse  Dressing: Clean, Dry, Intact, HMV intact w serosang output    A/P: 62 year old male s/p C3-7 Lami/Fusion  - Pain Control  - Regular Diet  - PT/OT, OOB  - Aspen collar  - Discharge Planning

## 2017-12-11 NOTE — OCCUPATIONAL THERAPY INITIAL EVALUATION ADULT - PERTINENT HX OF CURRENT PROBLEM, REHAB EVAL
63 y/o male with PMH of HTN, CHRISSIE not compliant with CPAP, HLD and Obesity presents to [facility] for preoperative evaluation with dx of spinal stenosis, cervical region. Pt reports increased pain of the cervical spine for 2 months. He reports weakness of his bilateral upper extremities with associated numbness and tingling. s/p Lumbar Laminectomy and Fusion January 2017. Scheduled for C3-6 Posterior Cervical Decompression and Fusion on 12/7/2017.

## 2017-12-11 NOTE — PROGRESS NOTE ADULT - PROBLEM SELECTOR PLAN 2
pt should wear cpap hs and while napping
pt should wear cpap hs and while napping, encouraged use at home as well
post op management per ortho, DVT ppx (foot pumps), IS, mobilize as appropriate. Pain control with dilaudid IV PRN.

## 2017-12-11 NOTE — CHART NOTE - NSCHARTNOTEFT_GEN_A_CORE
Spoke with Dr Matthew, patient stable for discharge home and echocardiogram can be performed as an outpatient in his office this week.

## 2017-12-11 NOTE — PROGRESS NOTE ADULT - ASSESSMENT
62M spinal stenosis s/p C3-7 laminectomy and fusion on 12/7 complicated by post-op anemia and leukocytosis, CKD stage 4, CHRISSIE not on CPAP, HTN. Had episode of hypotension, bradycardia this morning
62M spinal stenosis s/p C3-7 laminectomy and fusion on 12/7 complicated by post-op anemia and leukocytosis, CKD stage 4, CHRISSIE not on CPAP, HTN. Had episode of hypotension, bradycardia this morning
Hypotension and bradycardia   likely vagal phenomenon   resolved  meds on hold  obtain echo   monitor clinically  head CT neg    CHRISSIE  CPAP
Hypotension and bradycardia   likely vagal phenomenon   resolved  meds on hold  obtain echo  monitor clinically  head CT neg    CHRISSIE  CPAP
62M spinal stenosis s/p C3-7 laminectomy and fusion on 12/7 complicated by post-op anemia and leukocytosis, CHRISSIE not on CPAP, HTN. Had episode of hypotension, bradycardia.

## 2017-12-11 NOTE — PROGRESS NOTE ADULT - SUBJECTIVE AND OBJECTIVE BOX
Subjective: Patient seen and examined. No new events except as noted.     SUBJECTIVE/ROS:  No chest pain, or sob.       MEDICATIONS:  MEDICATIONS  (STANDING):  acetaminophen   Tablet. 650 milliGRAM(s) Oral every 6 hours  baclofen 10 milliGRAM(s) Oral three times a day  docusate sodium 100 milliGRAM(s) Oral two times a day  fenofibrate Tablet 145 milliGRAM(s) Oral daily  furosemide    Tablet 20 milliGRAM(s) Oral every other day  gabapentin 800 milliGRAM(s) Oral two times a day  lactated ringers. 1000 milliLiter(s) (150 mL/Hr) IV Continuous <Continuous>  oxybutynin 5 milliGRAM(s) Oral two times a day  senna 2 Tablet(s) Oral at bedtime  sodium chloride 0.9% lock flush 3 milliLiter(s) IV Push every 8 hours  tamsulosin 0.4 milliGRAM(s) Oral at bedtime      PHYSICAL EXAM:  T(C): 36.4 (12-11-17 @ 09:23), Max: 37 (12-11-17 @ 00:56)  HR: 76 (12-11-17 @ 09:23) (64 - 82)  BP: 120/57 (12-11-17 @ 09:23) (114/74 - 131/59)  RR: 18 (12-11-17 @ 09:23) (17 - 18)  SpO2: 96% (12-11-17 @ 09:23) (96% - 100%)  Wt(kg): --  I&O's Summary    10 Dec 2017 07:01  -  11 Dec 2017 07:00  --------------------------------------------------------  IN: 0 mL / OUT: 2400 mL / NET: -2400 mL    11 Dec 2017 07:01  -  11 Dec 2017 10:44  --------------------------------------------------------  IN: 0 mL / OUT: 300 mL / NET: -300 mL          Appearance: Normal	  HEENT:   Normal oral mucosa, PERRL, EOMI	  Cardiovascular: Normal S1 S2,    Murmur:   Neck: JVP normal  Respiratory: Lungs clear to auscultation  Gastrointestinal:  Soft, Non-tender, + BS	  Skin: normal   Neuro: No gross deficits.   Psychiatry:  Mood & affect appropriate  Ext: No edema      LABS/DATA:    CARDIAC MARKERS:                                9.5    9.23  )-----------( 227      ( 10 Dec 2017 06:38 )             30.0     12-10    141  |  103  |  16  ----------------------------<  97  3.9   |  28  |  1.08    Ca    8.5      10 Dec 2017 06:38      proBNP:   Lipid Profile:   HgA1c:   TSH:     TELE:  EKG:

## 2017-12-11 NOTE — PROGRESS NOTE ADULT - PROBLEM SELECTOR PLAN 1
episode hypotension, bradycardia - s/p cards and SICU eval, likely vagal phenomena, head CT negative, bp meds/beta blocker held  - BP stable, no recurrent episodes
episode hypotension, bradycardia - s/p cards and SICU eval, likely vagal phenomena, head CT negative, bp meds/beta blocker held, f/u closely  - will d/c lasix for now, f/u with cards in am
s/p C3-7 laminectomy and fusion.

## 2017-12-11 NOTE — PROGRESS NOTE ADULT - PROBLEM SELECTOR PROBLEM 1
Hypotension, unspecified hypotension type
Hypotension, unspecified hypotension type
Spinal stenosis, unspecified spinal region

## 2017-12-11 NOTE — PROGRESS NOTE ADULT - SUBJECTIVE AND OBJECTIVE BOX
CC: F/U for vasovagal episode    SUBJECTIVE / OVERNIGHT EVENTS:  No new episodes of AMS or syncope.  Tolerated CPAP well. Pain is controlled with pain meds.  No F/C, N/V, CP, SOB, Cough, lightheadedness, dizziness, abdominal pain, diarrhea, dysuria.    MEDICATIONS  (STANDING):  acetaminophen   Tablet. 650 milliGRAM(s) Oral every 6 hours  baclofen 10 milliGRAM(s) Oral three times a day  docusate sodium 100 milliGRAM(s) Oral two times a day  fenofibrate Tablet 145 milliGRAM(s) Oral daily  furosemide    Tablet 20 milliGRAM(s) Oral every other day  gabapentin 800 milliGRAM(s) Oral two times a day  lactated ringers. 1000 milliLiter(s) (150 mL/Hr) IV Continuous <Continuous>  oxybutynin 5 milliGRAM(s) Oral two times a day  senna 2 Tablet(s) Oral at bedtime  sodium chloride 0.9% lock flush 3 milliLiter(s) IV Push every 8 hours  tamsulosin 0.4 milliGRAM(s) Oral at bedtime    MEDICATIONS  (PRN):  acetaminophen   Tablet 650 milliGRAM(s) Oral every 6 hours PRN For Temp greater than 38 C (100.4 F)  aluminum hydroxide/magnesium hydroxide/simethicone Suspension 30 milliLiter(s) Oral every 12 hours PRN Indigestion  diphenhydrAMINE   Injectable 12.5 milliGRAM(s) IV Push every 4 hours PRN Itching and insomnia  diphenhydrAMINE   Injectable 25 milliGRAM(s) IV Push every 4 hours PRN Pruritus  famotidine    Tablet 20 milliGRAM(s) Oral every 12 hours PRN Dyspepsia  HYDROmorphone  Injectable 1 milliGRAM(s) IV Push every 10 minutes PRN Severe Pain (7 - 10)  HYDROmorphone  Injectable 1 milliGRAM(s) IV Push every 3 hours PRN Severe Pain (7 - 10)  magnesium hydroxide Suspension 30 milliLiter(s) Oral every 12 hours PRN Constipation  naloxone Injectable 0.1 milliGRAM(s) IV Push every 3 minutes PRN For ANY of the following changes in patient status:  A. RR LESS THAN 10 breaths per minute, B. Oxygen saturation LESS THAN 90%, C. Sedation score of 6  ondansetron Injectable 4 milliGRAM(s) IV Push every 6 hours PRN Nausea  oxyCODONE    IR 5 milliGRAM(s) Oral every 3 hours PRN Moderate Pain (4 - 6)  oxyCODONE    IR 10 milliGRAM(s) Oral every 3 hours PRN Severe Pain (7 - 10)      Vital Signs Last 24 Hrs  T(C): 36.4 (11 Dec 2017 09:23), Max: 37 (11 Dec 2017 00:56)  T(F): 97.6 (11 Dec 2017 09:23), Max: 98.6 (11 Dec 2017 00:56)  HR: 76 (11 Dec 2017 09:23) (64 - 82)  BP: 120/57 (11 Dec 2017 09:23) (114/74 - 131/59)  BP(mean): --  RR: 18 (11 Dec 2017 09:23) (17 - 18)  SpO2: 96% (11 Dec 2017 09:23) (96% - 100%)  CAPILLARY BLOOD GLUCOSE        I&O's Summary    10 Dec 2017 07:01  -  11 Dec 2017 07:00  --------------------------------------------------------  IN: 0 mL / OUT: 2400 mL / NET: -2400 mL    11 Dec 2017 07:01  -  11 Dec 2017 10:12  --------------------------------------------------------  IN: 0 mL / OUT: 300 mL / NET: -300 mL        PHYSICAL EXAM:  GENERAL: NAD, well-developed, sitting up in chair  HEAD:  Atraumatic, Normocephalic  EYES: EOMI, PERRLA, conjunctiva and sclera clear  NECK: wearing collar, limited ROM due to pain.  CHEST/LUNG: Clear to auscultation bilaterally; No wheeze  HEART: Regular rate and rhythm; No murmurs, rubs, or gallops  ABDOMEN: Soft, Nontender, Nondistended; Bowel sounds present  EXTREMITIES:  2+ Peripheral Pulses, No clubbing, cyanosis, or edema  PSYCH: AAOx3  NEUROLOGY: non-focal  SKIN: Surgical dressing C/D/I    LABS:                        9.5    9.23  )-----------( 227      ( 10 Dec 2017 06:38 )             30.0     12-10    141  |  103  |  16  ----------------------------<  97  3.9   |  28  |  1.08    Ca    8.5      10 Dec 2017 06:38                RADIOLOGY & ADDITIONAL TESTS:    Imaging Personally Reviewed:    Care Discussed with Consultants/Other Providers:    Care Discussed with primary team. CC: F/U for vasovagal episode    SUBJECTIVE / OVERNIGHT EVENTS:  No new episodes of AMS or syncope.  Tolerated CPAP well. Pain is controlled with pain meds.  No F/C, N/V, CP, SOB, Cough, lightheadedness, dizziness, abdominal pain, diarrhea, dysuria.    MEDICATIONS  (STANDING):  acetaminophen   Tablet. 650 milliGRAM(s) Oral every 6 hours  baclofen 10 milliGRAM(s) Oral three times a day  docusate sodium 100 milliGRAM(s) Oral two times a day  fenofibrate Tablet 145 milliGRAM(s) Oral daily  furosemide    Tablet 20 milliGRAM(s) Oral every other day  gabapentin 800 milliGRAM(s) Oral two times a day  lactated ringers. 1000 milliLiter(s) (150 mL/Hr) IV Continuous <Continuous>  oxybutynin 5 milliGRAM(s) Oral two times a day  senna 2 Tablet(s) Oral at bedtime  sodium chloride 0.9% lock flush 3 milliLiter(s) IV Push every 8 hours  tamsulosin 0.4 milliGRAM(s) Oral at bedtime    MEDICATIONS  (PRN):  acetaminophen   Tablet 650 milliGRAM(s) Oral every 6 hours PRN For Temp greater than 38 C (100.4 F)  aluminum hydroxide/magnesium hydroxide/simethicone Suspension 30 milliLiter(s) Oral every 12 hours PRN Indigestion  diphenhydrAMINE   Injectable 12.5 milliGRAM(s) IV Push every 4 hours PRN Itching and insomnia  diphenhydrAMINE   Injectable 25 milliGRAM(s) IV Push every 4 hours PRN Pruritus  famotidine    Tablet 20 milliGRAM(s) Oral every 12 hours PRN Dyspepsia  HYDROmorphone  Injectable 1 milliGRAM(s) IV Push every 10 minutes PRN Severe Pain (7 - 10)  HYDROmorphone  Injectable 1 milliGRAM(s) IV Push every 3 hours PRN Severe Pain (7 - 10)  magnesium hydroxide Suspension 30 milliLiter(s) Oral every 12 hours PRN Constipation  naloxone Injectable 0.1 milliGRAM(s) IV Push every 3 minutes PRN For ANY of the following changes in patient status:  A. RR LESS THAN 10 breaths per minute, B. Oxygen saturation LESS THAN 90%, C. Sedation score of 6  ondansetron Injectable 4 milliGRAM(s) IV Push every 6 hours PRN Nausea  oxyCODONE    IR 5 milliGRAM(s) Oral every 3 hours PRN Moderate Pain (4 - 6)  oxyCODONE    IR 10 milliGRAM(s) Oral every 3 hours PRN Severe Pain (7 - 10)      Vital Signs Last 24 Hrs  T(C): 36.4 (11 Dec 2017 09:23), Max: 37 (11 Dec 2017 00:56)  T(F): 97.6 (11 Dec 2017 09:23), Max: 98.6 (11 Dec 2017 00:56)  HR: 76 (11 Dec 2017 09:23) (64 - 82)  BP: 120/57 (11 Dec 2017 09:23) (114/74 - 131/59)  BP(mean): --  RR: 18 (11 Dec 2017 09:23) (17 - 18)  SpO2: 96% (11 Dec 2017 09:23) (96% - 100%)  CAPILLARY BLOOD GLUCOSE        I&O's Summary    10 Dec 2017 07:01  -  11 Dec 2017 07:00  --------------------------------------------------------  IN: 0 mL / OUT: 2400 mL / NET: -2400 mL    11 Dec 2017 07:01  -  11 Dec 2017 10:12  --------------------------------------------------------  IN: 0 mL / OUT: 300 mL / NET: -300 mL        PHYSICAL EXAM:  GENERAL: NAD, well-developed, sitting up in chair  HEAD:  Atraumatic, Normocephalic  EYES: EOMI, PERRLA, conjunctiva and sclera clear  NECK: wearing collar, limited ROM due to pain. Drain in place.  CHEST/LUNG: Clear to auscultation bilaterally; No wheeze  HEART: Regular rate and rhythm; No murmurs, rubs, or gallops  ABDOMEN: Soft, Nontender, Nondistended; Bowel sounds present  EXTREMITIES:  2+ Peripheral Pulses, No clubbing, cyanosis, or edema  PSYCH: AAOx3  NEUROLOGY: non-focal  SKIN: Surgical dressing C/D/I    LABS:                        9.5    9.23  )-----------( 227      ( 10 Dec 2017 06:38 )             30.0     12-10    141  |  103  |  16  ----------------------------<  97  3.9   |  28  |  1.08    Ca    8.5      10 Dec 2017 06:38                RADIOLOGY & ADDITIONAL TESTS:    Imaging Personally Reviewed:    Care Discussed with Consultants/Other Providers:    Care Discussed with primary team.

## 2017-12-11 NOTE — PROGRESS NOTE ADULT - PROBLEM SELECTOR PLAN 5
stable, Likely due to post-op blood loss.  Monitor H/h.  no indication for PRBC transfusion today.
Cr improved today, bp meds held as above for now
Cr improved today, bp meds held as above for now

## 2017-12-12 RX ORDER — ASPIRIN/CALCIUM CARB/MAGNESIUM 324 MG
1 TABLET ORAL
Qty: 14 | Refills: 0 | OUTPATIENT
Start: 2017-12-12 | End: 2017-12-25

## 2017-12-14 PROCEDURE — 63045 LAM FACETEC & FORAMOT CRV: CPT | Mod: 82

## 2017-12-14 PROCEDURE — 22600 ARTHRD PST TQ 1NTRSPC CRV: CPT | Mod: 82

## 2017-12-14 PROCEDURE — 22614 ARTHRD PST TQ 1NTRSPC EA ADD: CPT | Mod: 82

## 2017-12-14 PROCEDURE — 22842 INSERT SPINE FIXATION DEVICE: CPT | Mod: 82

## 2017-12-14 PROCEDURE — 63048 LAM FACETEC &FORAMOT EA ADDL: CPT | Mod: 82

## 2017-12-15 ENCOUNTER — APPOINTMENT (OUTPATIENT)
Dept: UROLOGY | Facility: CLINIC | Age: 62
End: 2017-12-15

## 2017-12-18 ENCOUNTER — APPOINTMENT (OUTPATIENT)
Dept: ORTHOPEDIC SURGERY | Facility: CLINIC | Age: 62
End: 2017-12-18

## 2017-12-18 ENCOUNTER — RX RENEWAL (OUTPATIENT)
Age: 62
End: 2017-12-18

## 2017-12-20 ENCOUNTER — APPOINTMENT (OUTPATIENT)
Dept: ORTHOPEDIC SURGERY | Facility: CLINIC | Age: 62
End: 2017-12-20
Payer: MEDICAID

## 2017-12-20 PROCEDURE — 99024 POSTOP FOLLOW-UP VISIT: CPT

## 2017-12-20 PROCEDURE — 72040 X-RAY EXAM NECK SPINE 2-3 VW: CPT

## 2018-01-08 ENCOUNTER — APPOINTMENT (OUTPATIENT)
Dept: ORTHOPEDIC SURGERY | Facility: CLINIC | Age: 63
End: 2018-01-08
Payer: MEDICAID

## 2018-01-08 PROCEDURE — 99024 POSTOP FOLLOW-UP VISIT: CPT

## 2018-01-08 PROCEDURE — 72040 X-RAY EXAM NECK SPINE 2-3 VW: CPT

## 2018-01-22 ENCOUNTER — APPOINTMENT (OUTPATIENT)
Dept: UROLOGY | Facility: CLINIC | Age: 63
End: 2018-01-22
Payer: MEDICAID

## 2018-01-22 ENCOUNTER — APPOINTMENT (OUTPATIENT)
Age: 63
End: 2018-01-22

## 2018-01-22 VITALS
WEIGHT: 208 LBS | RESPIRATION RATE: 18 BRPM | TEMPERATURE: 97.4 F | OXYGEN SATURATION: 99 % | SYSTOLIC BLOOD PRESSURE: 129 MMHG | DIASTOLIC BLOOD PRESSURE: 86 MMHG | BODY MASS INDEX: 29.45 KG/M2 | HEART RATE: 68 BPM

## 2018-01-22 PROCEDURE — 99213 OFFICE O/P EST LOW 20 MIN: CPT

## 2018-01-22 PROCEDURE — 51798 US URINE CAPACITY MEASURE: CPT

## 2018-01-30 ENCOUNTER — APPOINTMENT (OUTPATIENT)
Dept: MRI IMAGING | Facility: CLINIC | Age: 63
End: 2018-01-30

## 2018-01-31 ENCOUNTER — RX RENEWAL (OUTPATIENT)
Age: 63
End: 2018-01-31

## 2018-02-22 ENCOUNTER — APPOINTMENT (OUTPATIENT)
Dept: NEUROLOGY | Facility: CLINIC | Age: 63
End: 2018-02-22

## 2018-02-23 ENCOUNTER — FORM ENCOUNTER (OUTPATIENT)
Age: 63
End: 2018-02-23

## 2018-02-23 ENCOUNTER — APPOINTMENT (OUTPATIENT)
Dept: ORTHOPEDIC SURGERY | Facility: CLINIC | Age: 63
End: 2018-02-23
Payer: MEDICAID

## 2018-02-23 PROCEDURE — 99214 OFFICE O/P EST MOD 30 MIN: CPT | Mod: 24

## 2018-02-24 ENCOUNTER — OUTPATIENT (OUTPATIENT)
Dept: OUTPATIENT SERVICES | Facility: HOSPITAL | Age: 63
LOS: 1 days | End: 2018-02-24
Payer: MEDICAID

## 2018-02-24 ENCOUNTER — APPOINTMENT (OUTPATIENT)
Dept: MRI IMAGING | Facility: CLINIC | Age: 63
End: 2018-02-24
Payer: MEDICAID

## 2018-02-24 DIAGNOSIS — Z98.89 OTHER SPECIFIED POSTPROCEDURAL STATES: Chronic | ICD-10-CM

## 2018-02-24 DIAGNOSIS — M48.02 SPINAL STENOSIS, CERVICAL REGION: ICD-10-CM

## 2018-02-24 DIAGNOSIS — Z98.890 OTHER SPECIFIED POSTPROCEDURAL STATES: Chronic | ICD-10-CM

## 2018-02-24 PROCEDURE — 72141 MRI NECK SPINE W/O DYE: CPT

## 2018-02-24 PROCEDURE — 72141 MRI NECK SPINE W/O DYE: CPT | Mod: 26

## 2018-03-23 ENCOUNTER — APPOINTMENT (OUTPATIENT)
Dept: ORTHOPEDIC SURGERY | Facility: CLINIC | Age: 63
End: 2018-03-23

## 2018-04-13 ENCOUNTER — APPOINTMENT (OUTPATIENT)
Dept: NEUROLOGY | Facility: CLINIC | Age: 63
End: 2018-04-13
Payer: MEDICAID

## 2018-04-13 PROCEDURE — 95910 NRV CNDJ TEST 7-8 STUDIES: CPT

## 2018-04-13 PROCEDURE — 95886 MUSC TEST DONE W/N TEST COMP: CPT

## 2018-04-27 ENCOUNTER — APPOINTMENT (OUTPATIENT)
Dept: ORTHOPEDIC SURGERY | Facility: CLINIC | Age: 63
End: 2018-04-27
Payer: MEDICAID

## 2018-04-27 PROCEDURE — 99214 OFFICE O/P EST MOD 30 MIN: CPT

## 2018-05-01 ENCOUNTER — APPOINTMENT (OUTPATIENT)
Dept: NEUROLOGY | Facility: CLINIC | Age: 63
End: 2018-05-01

## 2018-05-09 ENCOUNTER — APPOINTMENT (OUTPATIENT)
Dept: ULTRASOUND IMAGING | Facility: CLINIC | Age: 63
End: 2018-05-09
Payer: MEDICAID

## 2018-05-09 ENCOUNTER — OUTPATIENT (OUTPATIENT)
Dept: OUTPATIENT SERVICES | Facility: HOSPITAL | Age: 63
LOS: 1 days | End: 2018-05-09
Payer: MEDICAID

## 2018-05-09 ENCOUNTER — APPOINTMENT (OUTPATIENT)
Dept: ORTHOPEDIC SURGERY | Facility: CLINIC | Age: 63
End: 2018-05-09
Payer: MEDICAID

## 2018-05-09 ENCOUNTER — APPOINTMENT (OUTPATIENT)
Dept: ORTHOPEDIC SURGERY | Facility: CLINIC | Age: 63
End: 2018-05-09

## 2018-05-09 DIAGNOSIS — Z98.890 OTHER SPECIFIED POSTPROCEDURAL STATES: Chronic | ICD-10-CM

## 2018-05-09 DIAGNOSIS — Z00.8 ENCOUNTER FOR OTHER GENERAL EXAMINATION: ICD-10-CM

## 2018-05-09 DIAGNOSIS — Z98.89 OTHER SPECIFIED POSTPROCEDURAL STATES: Chronic | ICD-10-CM

## 2018-05-09 PROCEDURE — 99214 OFFICE O/P EST MOD 30 MIN: CPT

## 2018-05-09 PROCEDURE — 72040 X-RAY EXAM NECK SPINE 2-3 VW: CPT

## 2018-05-09 PROCEDURE — 93971 EXTREMITY STUDY: CPT

## 2018-05-09 PROCEDURE — 93971 EXTREMITY STUDY: CPT | Mod: 26

## 2018-05-16 ENCOUNTER — APPOINTMENT (OUTPATIENT)
Dept: ORTHOPEDIC SURGERY | Facility: CLINIC | Age: 63
End: 2018-05-16
Payer: MEDICAID

## 2018-05-16 PROCEDURE — 99214 OFFICE O/P EST MOD 30 MIN: CPT

## 2018-07-10 ENCOUNTER — TRANSCRIPTION ENCOUNTER (OUTPATIENT)
Age: 63
End: 2018-07-10

## 2018-07-18 PROBLEM — M48.02 SPINAL STENOSIS, CERVICAL REGION: Chronic | Status: ACTIVE | Noted: 2017-12-05

## 2018-07-18 PROBLEM — M47.9 SPONDYLOSIS, UNSPECIFIED: Chronic | Status: ACTIVE | Noted: 2017-12-05

## 2018-07-18 PROBLEM — N40.0 BENIGN PROSTATIC HYPERPLASIA WITHOUT LOWER URINARY TRACT SYMPTOMS: Chronic | Status: ACTIVE | Noted: 2017-12-05

## 2018-07-23 ENCOUNTER — APPOINTMENT (OUTPATIENT)
Dept: UROLOGY | Facility: CLINIC | Age: 63
End: 2018-07-23

## 2018-07-27 ENCOUNTER — APPOINTMENT (OUTPATIENT)
Dept: ORTHOPEDIC SURGERY | Facility: CLINIC | Age: 63
End: 2018-07-27
Payer: MEDICAID

## 2018-07-27 VITALS
HEIGHT: 71 IN | DIASTOLIC BLOOD PRESSURE: 79 MMHG | BODY MASS INDEX: 29.96 KG/M2 | HEART RATE: 65 BPM | SYSTOLIC BLOOD PRESSURE: 117 MMHG | WEIGHT: 214 LBS

## 2018-07-27 PROCEDURE — 99214 OFFICE O/P EST MOD 30 MIN: CPT

## 2018-07-30 ENCOUNTER — APPOINTMENT (OUTPATIENT)
Dept: ORTHOPEDIC SURGERY | Facility: CLINIC | Age: 63
End: 2018-07-30
Payer: MEDICAID

## 2018-07-30 VITALS
SYSTOLIC BLOOD PRESSURE: 148 MMHG | WEIGHT: 214 LBS | HEART RATE: 55 BPM | DIASTOLIC BLOOD PRESSURE: 86 MMHG | HEIGHT: 71 IN | BODY MASS INDEX: 29.96 KG/M2

## 2018-07-30 PROCEDURE — 99214 OFFICE O/P EST MOD 30 MIN: CPT

## 2018-08-06 ENCOUNTER — APPOINTMENT (OUTPATIENT)
Dept: UROLOGY | Facility: CLINIC | Age: 63
End: 2018-08-06
Payer: MEDICAID

## 2018-08-06 VITALS
OXYGEN SATURATION: 99 % | BODY MASS INDEX: 29.57 KG/M2 | TEMPERATURE: 97.4 F | SYSTOLIC BLOOD PRESSURE: 148 MMHG | WEIGHT: 212 LBS | DIASTOLIC BLOOD PRESSURE: 86 MMHG | RESPIRATION RATE: 17 BRPM | HEART RATE: 59 BPM

## 2018-08-06 DIAGNOSIS — N39.41 URGE INCONTINENCE: ICD-10-CM

## 2018-08-06 PROCEDURE — 99213 OFFICE O/P EST LOW 20 MIN: CPT

## 2018-08-15 ENCOUNTER — APPOINTMENT (OUTPATIENT)
Dept: INTERNAL MEDICINE | Facility: CLINIC | Age: 63
End: 2018-08-15

## 2018-09-07 ENCOUNTER — APPOINTMENT (OUTPATIENT)
Dept: ORTHOPEDIC SURGERY | Facility: CLINIC | Age: 63
End: 2018-09-07

## 2018-09-25 ENCOUNTER — APPOINTMENT (OUTPATIENT)
Dept: VASCULAR SURGERY | Facility: CLINIC | Age: 63
End: 2018-09-25
Payer: MEDICAID

## 2018-09-25 VITALS
WEIGHT: 218 LBS | DIASTOLIC BLOOD PRESSURE: 88 MMHG | HEART RATE: 73 BPM | SYSTOLIC BLOOD PRESSURE: 141 MMHG | BODY MASS INDEX: 30.52 KG/M2 | TEMPERATURE: 97.7 F | HEIGHT: 71 IN

## 2018-09-25 PROCEDURE — 99202 OFFICE O/P NEW SF 15 MIN: CPT

## 2018-10-05 ENCOUNTER — APPOINTMENT (OUTPATIENT)
Dept: ORTHOPEDIC SURGERY | Facility: CLINIC | Age: 63
End: 2018-10-05
Payer: MEDICAID

## 2018-10-05 PROCEDURE — 99214 OFFICE O/P EST MOD 30 MIN: CPT

## 2018-10-19 ENCOUNTER — CHART COPY (OUTPATIENT)
Age: 63
End: 2018-10-19

## 2018-11-13 ENCOUNTER — APPOINTMENT (OUTPATIENT)
Dept: VASCULAR SURGERY | Facility: CLINIC | Age: 63
End: 2018-11-13
Payer: MEDICAID

## 2018-11-13 VITALS
BODY MASS INDEX: 30.52 KG/M2 | TEMPERATURE: 97.6 F | DIASTOLIC BLOOD PRESSURE: 80 MMHG | WEIGHT: 218 LBS | HEART RATE: 66 BPM | SYSTOLIC BLOOD PRESSURE: 122 MMHG | HEIGHT: 71 IN

## 2018-11-13 PROCEDURE — 93971 EXTREMITY STUDY: CPT

## 2018-11-13 PROCEDURE — 93926 LOWER EXTREMITY STUDY: CPT

## 2018-11-13 PROCEDURE — 99213 OFFICE O/P EST LOW 20 MIN: CPT

## 2018-11-18 ENCOUNTER — FORM ENCOUNTER (OUTPATIENT)
Age: 63
End: 2018-11-18

## 2018-11-19 ENCOUNTER — OUTPATIENT (OUTPATIENT)
Dept: OUTPATIENT SERVICES | Facility: HOSPITAL | Age: 63
LOS: 1 days | End: 2018-11-19
Payer: MEDICAID

## 2018-11-19 ENCOUNTER — APPOINTMENT (OUTPATIENT)
Dept: CT IMAGING | Facility: CLINIC | Age: 63
End: 2018-11-19
Payer: MEDICAID

## 2018-11-19 DIAGNOSIS — Z98.89 OTHER SPECIFIED POSTPROCEDURAL STATES: Chronic | ICD-10-CM

## 2018-11-19 DIAGNOSIS — Z98.890 OTHER SPECIFIED POSTPROCEDURAL STATES: Chronic | ICD-10-CM

## 2018-11-19 DIAGNOSIS — Z00.8 ENCOUNTER FOR OTHER GENERAL EXAMINATION: ICD-10-CM

## 2018-11-19 PROCEDURE — 73706 CT ANGIO LWR EXTR W/O&W/DYE: CPT

## 2018-11-19 PROCEDURE — 82565 ASSAY OF CREATININE: CPT

## 2018-11-19 PROCEDURE — 73706 CT ANGIO LWR EXTR W/O&W/DYE: CPT | Mod: 26,LT

## 2018-12-07 ENCOUNTER — APPOINTMENT (OUTPATIENT)
Dept: ORTHOPEDIC SURGERY | Facility: CLINIC | Age: 63
End: 2018-12-07
Payer: MEDICAID

## 2018-12-07 PROCEDURE — ZZZZZ: CPT

## 2018-12-21 ENCOUNTER — APPOINTMENT (OUTPATIENT)
Dept: ORTHOPEDIC SURGERY | Facility: CLINIC | Age: 63
End: 2018-12-21
Payer: MEDICAID

## 2018-12-21 PROCEDURE — 99214 OFFICE O/P EST MOD 30 MIN: CPT

## 2019-01-09 ENCOUNTER — APPOINTMENT (OUTPATIENT)
Dept: ORTHOPEDIC SURGERY | Facility: CLINIC | Age: 64
End: 2019-01-09
Payer: MEDICAID

## 2019-01-09 PROCEDURE — 99214 OFFICE O/P EST MOD 30 MIN: CPT | Mod: 25

## 2019-01-09 PROCEDURE — 73564 X-RAY EXAM KNEE 4 OR MORE: CPT | Mod: LT

## 2019-01-09 PROCEDURE — 20611 DRAIN/INJ JOINT/BURSA W/US: CPT | Mod: LT

## 2019-01-09 NOTE — PHYSICAL EXAM
[de-identified] : Physical Examination\par General: well nourished, in no acute distress, alert and oriented to person, place and time\par Psychiatric: normal mood and affect, no abnormal movements or speech patterns\par Eyes: vision intact - glasses\par Throat: no thyromegaly\par Lymph: no enlarged nodes, no lymphedema in extremity\par Respiratory: no wheezing, no shortness of breath with ambulation\par Cardiac: no cardiac leg swelling, 2+ peripheral pulses\par Neurology: normal gross sensation in extremities to light touch\par Abdomen: soft, non-tender, tympanic, no masses\par \par Musculoskeletal Examination\par Ambulation	+ antalgic gait, - assistive devices\par \par Knee			Right			Left\par General\par      Swelling/Deformity	normal			normal	\par      Skin			normal			normal\par      Erythema		-			-\par      Standing Alignment	neutral			neutral\par      Effusion		none			trace\par Range of Motion\par      Hip			full painless ROM		full painless ROM\par      Knee Flexion		120			120\par      Knee Extension	0			0\par Patella\par      J Sign		-			-\par      Quad Medial/Lateral	1/1 1/1\par      Apprehension		-			-\par      Jett's		-			-\par      Grind Sign		-			-\par      Crepitus		-			-\par Palpation\par      Medial Joint Line	-			-\par      Medial Fem Condyle	-			-\par      Lateral Joint Line	-			-\par      Quad Tendon		-			-\par      Patella Tendon	-			-\par      Medial Patella		-			-\par      Lateral Patella 	-			-\par      Posterior Knee	-			+ posterior medial hamstring muscles, no tenderness laterally\par Ligamentous\par      Varus @ 0° / 30°	-/-			-/-\par      Valgus @ 0° / 30°	-/-			-/-\par      Lachman		-			-\par      Pivot Shift		-			-\par      Anterior Drawer	-			-\par      Posterior Drawer	-			-\par Meniscus\par      Michelle		-			=\par      Flexion Pinch		-			-\par Strength Examination/Atrophy\par      Hip Flexors 		5+			5+\par      Quadriceps		5+			5+\par      Hamstring		5+			5+\par      Tibialis Anterior	5+			5+\par      Achilles/Soleus	5+			5+\par Sensation\par      Deep Peroneal	normal			normal\par      Superficial Peroneal 	normal			normal\par      Sural  		normal			normal\par      Posterior Tibial 	normal			normal\par      Saphneous 		normal			normal\par Pulses\par      DP			2+			2+\par  [de-identified] : 5 views of the affected Left knee (standing AP, flexing standing AP, 30degree flexed lateral, 0degree lateral, sunrise view) were ordered, obtained and evaluated by myself today and demonstrate:\par There is mild symmetrical narrowing\par Small osteophytic lipping\par Trace suprapatellar effusion\par Mild to moderate medial patellofemoral joint space loss without evidence of tilt [or] subluxation on sunrise view\par Normal soft tissue density\par Otherwise normal osseous bone structure without fracture or dislocation\par \par \par CT scan bilateral lower extremity with angiography dated 11-23-80\par \par Visit small hypointense circular tubular shaped structure that is circumfrentially high bone intensity possibly calcification in the posterior lateral tibial plafond hugging the medial border of the fibula head traversing proximal to distal and progressing of the moving more medial next to the neurovascular structures - structure possibly popliteal muscle/tendon junction region\par \par \par Impression\par CENTRAL ARTERIAL SYSTEM: \par \par The distal aortic bifurcation and iliac arteries are widely patent. \par \par RIGHT LOWER EXTREMITY: \par \par Femoral, popliteal, and infrapopliteal arteries are widely patent. There is \par three-vessel runoff in the calf. \par \par LEFT LOWER EXTREMITY: \par \par Femoral, popliteal and infrapopliteal arteries are widely patent. There is \par three-vessel runoff in the calf. No AVM or AV fistula is present. \par \par ADDITIONAL FINDINGS: \par Status post right total hip arthroplasty. \par Dystrophic calcifications in the left semimembranosus muscle and in the \par right Achilles tendon. \par Fat with rim calcification in the left popliteus muscle. \par T2 bright lesion adjacent to the left fibular head on outside MRI is not \par vascularized by the current exam. \par \par IMPRESSION: No CT evidence of left calf AVM. \par \par \par \par MRI right knee from [facility] dated 6-11-18\par My impression of the images:\par Quality of the MRI is ok\par Medial Meniscus ok\par Lateral Meniscus ok\par There is mild chondral loss in the medial compartments\par There Is Not bone marrow edema/subchondral cysts in the medial compartments\par LCL is intact\par MCL is intact\par ACL is intact\par PCL is intact \par Quadriceps Tendon is intact\par Patella Tendon is intact\par non fluid filled structure in the same region seen on CT, bright on T1 hypointense on T2\par \par no official read\par \par \par

## 2019-01-09 NOTE — DISCUSSION/SUMMARY
[de-identified] : fatty calcification of the left popliteal musculotendinous junction - do not believe it is causing compression of adjacent neurovascular structions\par left leg has diffuse edema, suggest medical management\par mild left knee chondromalacia\par hamstring strain\par \par I discussed my findings on history, exam and radiology.\par \par I reviewed the anatomy and function of the periarticular muscles and tendons, patella, ligaments, menisci and cartilage of the knee using models, images and diagrams. Given the current findings for the patient, I recommend proceeding with non-operative management of the shoulder consisting of the following:\par \par Patient education about the knee motions causing pain and possibly injury for activity modification\par \par Avoid deep knee bends and squating to prevent exacerbating knee injury\par \par Ice or warm compress\par \par Physical therapy prescription with VMO/Hip Abductor/Lumbar Core strengthening, ROM stretching, mobilization, modalities, HEP\par \par The patient was prescribed Diclofenac PO non-steroidal anti-inflammatory medication. 50mg tablets twice daily to be taken for at least 1-2 weeks in a row and then PRN afterwards. Risks and benefits were discussed and include but not limited to renal damage and GI ulceration and bleeding.  They were advised to take with food to limit stomach upset as well as warned to stop the medication if worsening gastric pain or dizziness or other side effects. Also to immediately stop the medication and seek appriate medical attention if any severe stomach ache, gastritis, black/red vomit, black/red stools or any other medical concern.\par \par Procedure Note:\par \par Verbal consent was obtained for an arthrocentesis and intra-articular corticosteroid injection of the LEFT knee, after the risks and benefits were discussed with the patient. Potential adverse effects were discussed including but not limited to bleeding, skin/joint infection, local skin reactions including bleaching, bruising, stiffness, soreness, vasovagal episodes, transient hyperglycemia, avascular necrosis, pseudo-septic type reactions, post injection joint pain, allergic reaction to product or anesthetic and other rare but potential adverse effects along with benefits including decreased pain and improved stability prior to obtaining verbal informed consent. It was also discussed that for some patients the treatment is ineffective and there are no guarantees that the patient will experience improvement as the result of the injection. In rare occasions the injection can cause worsening of pain.\par \par The use of a Sonosite 15-6 MHz linear transducer with live ultrasound guidance of the knee was necessary given the patient's BMI and local body habitus overlying and obscuring the accurate identification of normal body bony anatomy used to identify the injection site and the depth of soft tissue envelope necessitating a longer than normal needle to reach the joint space, and to confirm the location of the needle tip and intra-articular delivery of the medication. Without the use of live ultrasound guidance the injection would have been more difficult and place the patient's neurovascular structures at risk from the longer needle needed to traverse the soft tissue envelope.\par \par A 6 inch bolster was placed underneath the knee to place the knee in a slightly flexed position. The superolateral injection site was identified using the ultrasound probe to identify the suprapatellar space and superior boarder of the patella first longitudinally and then transversely. The injection site was marked and prepped with a ChloraPrep swab and anesthetized with ethylchloride skin anesthesia. Using sterile technique a 20g 3 1/2 in spinal needle with 3 cc total of 1cc 1% lidocaine without epinephrine, 1cc 0.25% Marcaine without epinephrine and 1cc of 40mg/mL Kenalog was passed through the injection site towards the suprapatellar space under live ultrasound guidance and noted to penetrate the joint capsule. The medication was injected without resistance under live ultrasound visualization and noted to flow into the suprapatellar joint space. The injection site was sterilely dressed, there was minimal blood loss. The patient tolerated this procedure without any complications done by myself. Images were recorded and saved.\par \par The patient has been advised that if they notice any worsening of symptoms or any problems to contact me and seek care from a qualified medical professional. The patient was instructed to ice the knee and take NSAID medication on an as needed basis if the patient feels discomfort.\par \par \par if patient fails to improve with non-op management can consider resection of the calcific popliteal muscle/tendon, will require an open lateral incision and will work adjacent to several neurovascular structures\par \par The patient verifies their understanding the the visit, diagnosis and plan. They agree with the treatment plan and will contact the office with any questions or problems.\par \par FU after PT completion if unimproved\par

## 2019-01-09 NOTE — HISTORY OF PRESENT ILLNESS
[de-identified] : CC left knee\par \par HPI 64 yo male right HD presents with gradual onset of 1.5 years of activity-related pain in the posterior medial left knee [without injury]. The pain is worse or the same, and rated a 5 out of 10, described as aching, [without radiation]. Medication makes the pain better and standing and ambulation makes the pain worse. The patient reports associated symptoms of swelling of the leg. The patient - pain at night affecting sleep, and - similar pain previously.\par \par The patient has tried the following treatments:\par Activity modification	+\par Ice/Compression  	+\par Braces    		-\par Nsaids    		+\par Physical Therapy 	+ moderate help\par Cortisone Injection	-\par Visco Injection		-\par Arthroscopy		-\par \par Review of Systems is positive for the above musculoskeletal symptoms and is otherwise non-contributory for general, constitutional, psychiatric, neurologic, HEENT, cardiac, respiratory, gastrointestinal, reproductive, lymphatic, and dermatologic complaints.\par \par Consult by Dr Guevara\par \par

## 2019-01-18 ENCOUNTER — APPOINTMENT (OUTPATIENT)
Dept: ORTHOPEDIC SURGERY | Facility: CLINIC | Age: 64
End: 2019-01-18
Payer: MEDICAID

## 2019-01-18 PROCEDURE — 99214 OFFICE O/P EST MOD 30 MIN: CPT

## 2019-01-18 NOTE — DISCUSSION/SUMMARY
[de-identified] : Discuss further treatment options. He appears to have had a reaction more so to the cyclobenzaprine. He has stopped this. He will contact his pain physician regarding obtaining baclofen which is his usual muscle accident. After his left knee injection he has improvement in his left lower extremity swelling. Followup in 2 months

## 2019-01-18 NOTE — HISTORY OF PRESENT ILLNESS
[de-identified] : Mr. Horton presents to the office for a follow-up visit s/p C3-7 posterior fusion on 12/7/17.  He presents today with concerns of a twitching sensation in his arms.  He thinks it may be related to a medication.  He was given Diclofenac by Dr. Fulton which is his only new medication.

## 2019-01-18 NOTE — PHYSICAL EXAM
[de-identified] : Examination of the cervical spine reveals no midline or paraspinal tenderness to palpation. Well healed posterior cervical incision. Improved left hand strength. Otherwise stable neurologic examination. Reduced swelling of his left leg.

## 2019-02-08 ENCOUNTER — APPOINTMENT (OUTPATIENT)
Dept: ORTHOPEDIC SURGERY | Facility: CLINIC | Age: 64
End: 2019-02-08
Payer: MEDICAID

## 2019-02-08 PROCEDURE — 99214 OFFICE O/P EST MOD 30 MIN: CPT

## 2019-03-11 ENCOUNTER — OUTPATIENT (OUTPATIENT)
Dept: OUTPATIENT SERVICES | Facility: HOSPITAL | Age: 64
LOS: 1 days | End: 2019-03-11
Payer: MEDICAID

## 2019-03-11 VITALS
HEIGHT: 71 IN | TEMPERATURE: 98 F | HEART RATE: 68 BPM | OXYGEN SATURATION: 98 % | RESPIRATION RATE: 20 BRPM | DIASTOLIC BLOOD PRESSURE: 80 MMHG | WEIGHT: 220.9 LBS | SYSTOLIC BLOOD PRESSURE: 126 MMHG

## 2019-03-11 DIAGNOSIS — Z98.1 ARTHRODESIS STATUS: Chronic | ICD-10-CM

## 2019-03-11 DIAGNOSIS — Z01.818 ENCOUNTER FOR OTHER PREPROCEDURAL EXAMINATION: ICD-10-CM

## 2019-03-11 DIAGNOSIS — Z98.890 OTHER SPECIFIED POSTPROCEDURAL STATES: Chronic | ICD-10-CM

## 2019-03-11 DIAGNOSIS — G56.22 LESION OF ULNAR NERVE, LEFT UPPER LIMB: ICD-10-CM

## 2019-03-11 DIAGNOSIS — I10 ESSENTIAL (PRIMARY) HYPERTENSION: ICD-10-CM

## 2019-03-11 DIAGNOSIS — Z98.89 OTHER SPECIFIED POSTPROCEDURAL STATES: Chronic | ICD-10-CM

## 2019-03-11 DIAGNOSIS — G47.33 OBSTRUCTIVE SLEEP APNEA (ADULT) (PEDIATRIC): ICD-10-CM

## 2019-03-11 LAB
ANION GAP SERPL CALC-SCNC: 12 MMOL/L — SIGNIFICANT CHANGE UP (ref 5–17)
BUN SERPL-MCNC: 9 MG/DL — SIGNIFICANT CHANGE UP (ref 7–23)
CALCIUM SERPL-MCNC: 9.6 MG/DL — SIGNIFICANT CHANGE UP (ref 8.4–10.5)
CHLORIDE SERPL-SCNC: 105 MMOL/L — SIGNIFICANT CHANGE UP (ref 96–108)
CO2 SERPL-SCNC: 28 MMOL/L — SIGNIFICANT CHANGE UP (ref 22–31)
CREAT SERPL-MCNC: 1.09 MG/DL — SIGNIFICANT CHANGE UP (ref 0.5–1.3)
GLUCOSE SERPL-MCNC: 116 MG/DL — HIGH (ref 70–99)
HCT VFR BLD CALC: 47.1 % — SIGNIFICANT CHANGE UP (ref 39–50)
HGB BLD-MCNC: 14.4 G/DL — SIGNIFICANT CHANGE UP (ref 13–17)
MCHC RBC-ENTMCNC: 26.6 PG — LOW (ref 27–34)
MCHC RBC-ENTMCNC: 30.6 GM/DL — LOW (ref 32–36)
MCV RBC AUTO: 87.1 FL — SIGNIFICANT CHANGE UP (ref 80–100)
PLATELET # BLD AUTO: 212 K/UL — SIGNIFICANT CHANGE UP (ref 150–400)
POTASSIUM SERPL-MCNC: 4.1 MMOL/L — SIGNIFICANT CHANGE UP (ref 3.5–5.3)
POTASSIUM SERPL-SCNC: 4.1 MMOL/L — SIGNIFICANT CHANGE UP (ref 3.5–5.3)
RBC # BLD: 5.41 M/UL — SIGNIFICANT CHANGE UP (ref 4.2–5.8)
RBC # FLD: 14 % — SIGNIFICANT CHANGE UP (ref 10.3–14.5)
SODIUM SERPL-SCNC: 145 MMOL/L — SIGNIFICANT CHANGE UP (ref 135–145)
WBC # BLD: 4.73 K/UL — SIGNIFICANT CHANGE UP (ref 3.8–10.5)
WBC # FLD AUTO: 4.73 K/UL — SIGNIFICANT CHANGE UP (ref 3.8–10.5)

## 2019-03-11 PROCEDURE — G0463: CPT

## 2019-03-11 PROCEDURE — 85027 COMPLETE CBC AUTOMATED: CPT

## 2019-03-11 PROCEDURE — 80048 BASIC METABOLIC PNL TOTAL CA: CPT

## 2019-03-11 RX ORDER — DOCUSATE SODIUM 100 MG
1 CAPSULE ORAL
Qty: 0 | Refills: 0 | COMMUNITY

## 2019-03-11 RX ORDER — GABAPENTIN 400 MG/1
1 CAPSULE ORAL
Qty: 0 | Refills: 0 | COMMUNITY

## 2019-03-11 RX ORDER — SODIUM CHLORIDE 9 MG/ML
3 INJECTION INTRAMUSCULAR; INTRAVENOUS; SUBCUTANEOUS EVERY 8 HOURS
Qty: 0 | Refills: 0 | Status: DISCONTINUED | OUTPATIENT
Start: 2019-03-18 | End: 2019-04-02

## 2019-03-11 RX ORDER — LIDOCAINE HCL 20 MG/ML
0.2 VIAL (ML) INJECTION ONCE
Qty: 0 | Refills: 0 | Status: DISCONTINUED | OUTPATIENT
Start: 2019-03-18 | End: 2019-04-02

## 2019-03-11 NOTE — H&P PST ADULT - PROBLEM SELECTOR PLAN 1
Left ulnar nerve decompression and transposition at elbow, Left wrist ulnar nerve release on 3/18/19.

## 2019-03-11 NOTE — H&P PST ADULT - PMH
BPH (benign prostatic hyperplasia)    HTN (hypertension)    Hypertriglyceridemia    OA (osteoarthritis of the spine)  of right hip  Obesity (BMI 30.0-34.9)    CHRISSIE on CPAP  moderate  Other intervertebral disc displacement, lumbar region    Spinal stenosis in cervical region    Spinal stenosis, lumbosacral region    Swelling of lower extremity  on lasix

## 2019-03-11 NOTE — H&P PST ADULT - HISTORY OF PRESENT ILLNESS
63 yr old male with history of HTN, hypertriglyceridemia , lumbar & cervical spinal fusion 2017- on pain medications, with 63 yr old male with history of HTN, hypertriglyceridemia , lumbar & cervical spinal fusion 2017- on pain medications, with persistent left hand pain & numbness, since Jan 2018, with left ring and little finger extension & increasing finger atrophy  , Now coming in for Left ulnar nerve decompression and transposition at elbow, Left wrist ulnar nerve release on 3/18/19.

## 2019-03-11 NOTE — H&P PST ADULT - RS GEN PE MLT RESP DETAILS PC
normal/clear to auscultation bilaterally/respirations non-labored/airway patent/good air movement/breath sounds equal

## 2019-03-11 NOTE — H&P PST ADULT - NEUROLOGICAL DETAILS
responds to verbal commands/alert and oriented x 3/sensation intact/responds to pain/strength decreased

## 2019-03-11 NOTE — H&P PST ADULT - PSH
H/O laminectomy  and lumbar fusion in Jan 2017  History of hip surgery  s/p right hip replacement 2016  S/P cervical spinal fusion  Dec 2017

## 2019-03-12 ENCOUNTER — APPOINTMENT (OUTPATIENT)
Dept: ORTHOPEDIC SURGERY | Facility: CLINIC | Age: 64
End: 2019-03-12
Payer: MEDICAID

## 2019-03-12 PROBLEM — G47.33 OBSTRUCTIVE SLEEP APNEA (ADULT) (PEDIATRIC): Chronic | Status: ACTIVE | Noted: 2017-01-05

## 2019-03-12 PROCEDURE — 99214 OFFICE O/P EST MOD 30 MIN: CPT

## 2019-03-12 NOTE — DISCUSSION/SUMMARY
[de-identified] : fatty calcification of the left popliteal musculotendinous junction - do not believe it is causing compression of adjacent neurovascular structions\par left leg has diffuse edema, suggest medical management\par mild left knee chondromalacia\par hamstring strain\par \par I discussed my findings on history, exam and radiology.\par \par I reviewed the anatomy and function of the periarticular muscles and tendons, patella, ligaments, menisci and cartilage of the knee using models, images and diagrams. Given the current findings for the patient, I recommend proceeding with non-operative management of the shoulder consisting of the following:\par \par Patient education about the knee motions causing pain and possibly injury for activity modification\par \par Avoid deep knee bends and squating to prevent exacerbating knee injury\par \par Ice or warm compress\par \par Physical therapy prescription with VMO/Hip Abductor/Lumbar Core strengthening, ROM stretching, mobilization, modalities, HEP\par \par The patient was prescribed Diclofenac PO non-steroidal anti-inflammatory medication. 50mg tablets twice daily to be taken for at least 1-2 weeks in a row and then PRN afterwards. Risks and benefits were discussed and include but not limited to renal damage and GI ulceration and bleeding.  They were advised to take with food to limit stomach upset as well as warned to stop the medication if worsening gastric pain or dizziness or other side effects. Also to immediately stop the medication and seek appriate medical attention if any severe stomach ache, gastritis, black/red vomit, black/red stools or any other medical concern.\par \par The patient verifies their understanding the the visit, diagnosis and plan. They agree with the treatment plan and will contact the office with any questions or problems.\par \par FU after PT completion if unimproved\par

## 2019-03-12 NOTE — PHYSICAL EXAM
[de-identified] : Physical Examination\par General: well nourished, in no acute distress, alert and oriented to person, place and time\par Psychiatric: normal mood and affect, no abnormal movements or speech patterns\par Eyes: vision intact - glasses\par Throat: no thyromegaly\par Lymph: no enlarged nodes, no lymphedema in extremity\par Respiratory: no wheezing, no shortness of breath with ambulation\par Cardiac: no cardiac leg swelling, 2+ peripheral pulses\par Neurology: normal gross sensation in extremities to light touch\par Abdomen: soft, non-tender, tympanic, no masses\par \par Musculoskeletal Examination\par Ambulation	+ antalgic gait, - assistive devices\par \par Knee			Right			Left\par General\par      Swelling/Deformity	normal			normal	\par      Skin			normal			normal\par      Erythema		-			-\par      Standing Alignment	neutral			neutral\par      Effusion		none			trace\par Range of Motion\par      Hip			full painless ROM		full painless ROM\par      Knee Flexion		120			120\par      Knee Extension	0			0\par Patella\par      J Sign		-			-\par      Quad Medial/Lateral	1/1 1/1\par      Apprehension		-			-\par      Jett's		-			-\par      Grind Sign		-			-\par      Crepitus		-			-\par Palpation\par      Medial Joint Line	-			-\par      Medial Fem Condyle	-			-\par      Lateral Joint Line	-			-\par      Quad Tendon		-			-\par      Patella Tendon	-			-\par      Medial Patella		-			-\par      Lateral Patella 	-			-\par      Posterior Knee	-			-\par Ligamentous\par      Varus @ 0° / 30°	-/-			-/-\par      Valgus @ 0° / 30°	-/-			-/-\par      Lachman		-			-\par      Pivot Shift		-			-\par      Anterior Drawer	-			-\par      Posterior Drawer	-			-\par Meniscus\par      Michelle		-			=\par      Flexion Pinch		-			-\par Strength Examination/Atrophy\par      Hip Flexors 		5+			5+\par      Quadriceps		5+			5+\par      Hamstring		5+			5+\par      Tibialis Anterior	5+			5+\par      Achilles/Soleus	5+			5+\par Sensation\par      Deep Peroneal	normal			normal\par      Superficial Peroneal 	normal			normal\par      Sural  		normal			normal\par      Posterior Tibial 	normal			normal\par      Saphneous 		normal			normal\par Pulses\par      DP			2+			2+\par  [de-identified] : 5 views of the affected Left knee (standing AP, flexing standing AP, 30degree flexed lateral, 0degree lateral, sunrise view) \par demonstrate:\par There is mild symmetrical narrowing\par Small osteophytic lipping\par Trace suprapatellar effusion\par Mild to moderate medial patellofemoral joint space loss without evidence of tilt [or] subluxation on sunrise view\par Normal soft tissue density\par Otherwise normal osseous bone structure without fracture or dislocation\par \par \par CT scan bilateral lower extremity with angiography dated 11-23-80\par \par Visit small hypointense circular tubular shaped structure that is circumfrentially high bone intensity possibly calcification in the posterior lateral tibial plafond hugging the medial border of the fibula head traversing proximal to distal and progressing of the moving more medial next to the neurovascular structures - structure possibly popliteal muscle/tendon junction region\par \par \par Impression\par CENTRAL ARTERIAL SYSTEM: \par \par The distal aortic bifurcation and iliac arteries are widely patent. \par \par RIGHT LOWER EXTREMITY: \par \par Femoral, popliteal, and infrapopliteal arteries are widely patent. There is \par three-vessel runoff in the calf. \par \par LEFT LOWER EXTREMITY: \par \par Femoral, popliteal and infrapopliteal arteries are widely patent. There is \par three-vessel runoff in the calf. No AVM or AV fistula is present. \par \par ADDITIONAL FINDINGS: \par Status post right total hip arthroplasty. \par Dystrophic calcifications in the left semimembranosus muscle and in the \par right Achilles tendon. \par Fat with rim calcification in the left popliteus muscle. \par T2 bright lesion adjacent to the left fibular head on outside MRI is not \par vascularized by the current exam. \par \par IMPRESSION: No CT evidence of left calf AVM. \par \par \par \par MRI right knee from [facility] dated 6-11-18\par My impression of the images:\par Quality of the MRI is ok\par Medial Meniscus ok\par Lateral Meniscus ok\par There is mild chondral loss in the medial compartments\par There Is Not bone marrow edema/subchondral cysts in the medial compartments\par LCL is intact\par MCL is intact\par ACL is intact\par PCL is intact \par Quadriceps Tendon is intact\par Patella Tendon is intact\par non fluid filled structure in the same region seen on CT, bright on T1 hypointense on T2\par \par no official read\par \par \par

## 2019-03-17 ENCOUNTER — TRANSCRIPTION ENCOUNTER (OUTPATIENT)
Age: 64
End: 2019-03-17

## 2019-03-18 ENCOUNTER — OUTPATIENT (OUTPATIENT)
Dept: OUTPATIENT SERVICES | Facility: HOSPITAL | Age: 64
LOS: 1 days | End: 2019-03-18
Payer: MEDICAID

## 2019-03-18 ENCOUNTER — APPOINTMENT (OUTPATIENT)
Dept: ORTHOPEDIC SURGERY | Facility: HOSPITAL | Age: 64
End: 2019-03-18

## 2019-03-18 VITALS
SYSTOLIC BLOOD PRESSURE: 124 MMHG | HEART RATE: 77 BPM | OXYGEN SATURATION: 100 % | RESPIRATION RATE: 15 BRPM | DIASTOLIC BLOOD PRESSURE: 70 MMHG

## 2019-03-18 VITALS
SYSTOLIC BLOOD PRESSURE: 177 MMHG | TEMPERATURE: 98 F | RESPIRATION RATE: 18 BRPM | DIASTOLIC BLOOD PRESSURE: 70 MMHG | HEART RATE: 64 BPM | HEIGHT: 71 IN | WEIGHT: 220.9 LBS | OXYGEN SATURATION: 99 %

## 2019-03-18 DIAGNOSIS — Z98.89 OTHER SPECIFIED POSTPROCEDURAL STATES: Chronic | ICD-10-CM

## 2019-03-18 DIAGNOSIS — Z01.818 ENCOUNTER FOR OTHER PREPROCEDURAL EXAMINATION: ICD-10-CM

## 2019-03-18 DIAGNOSIS — G56.22 LESION OF ULNAR NERVE, LEFT UPPER LIMB: ICD-10-CM

## 2019-03-18 DIAGNOSIS — Z98.1 ARTHRODESIS STATUS: Chronic | ICD-10-CM

## 2019-03-18 DIAGNOSIS — Z98.890 OTHER SPECIFIED POSTPROCEDURAL STATES: Chronic | ICD-10-CM

## 2019-03-18 PROCEDURE — 64718 REVISE ULNAR NERVE AT ELBOW: CPT | Mod: LT

## 2019-03-18 PROCEDURE — 64719 REVISE ULNAR NERVE AT WRIST: CPT | Mod: LT

## 2019-03-18 RX ORDER — ACETAMINOPHEN 500 MG
1000 TABLET ORAL ONCE
Qty: 0 | Refills: 0 | Status: COMPLETED | OUTPATIENT
Start: 2019-03-18 | End: 2019-03-18

## 2019-03-18 RX ORDER — OXYCODONE HYDROCHLORIDE 5 MG/1
10 TABLET ORAL ONCE
Qty: 0 | Refills: 0 | Status: DISCONTINUED | OUTPATIENT
Start: 2019-03-18 | End: 2019-03-18

## 2019-03-18 RX ORDER — ATENOLOL 25 MG/1
1 TABLET ORAL
Qty: 0 | Refills: 0 | COMMUNITY

## 2019-03-18 RX ORDER — OXYBUTYNIN CHLORIDE 5 MG
1 TABLET ORAL
Qty: 0 | Refills: 0 | COMMUNITY

## 2019-03-18 RX ORDER — LOSARTAN POTASSIUM 100 MG/1
1 TABLET, FILM COATED ORAL
Qty: 0 | Refills: 0 | COMMUNITY

## 2019-03-18 RX ORDER — SODIUM CHLORIDE 9 MG/ML
1000 INJECTION, SOLUTION INTRAVENOUS
Qty: 0 | Refills: 0 | Status: DISCONTINUED | OUTPATIENT
Start: 2019-03-18 | End: 2019-04-02

## 2019-03-18 RX ORDER — GABAPENTIN 400 MG/1
1 CAPSULE ORAL
Qty: 0 | Refills: 0 | COMMUNITY

## 2019-03-18 RX ORDER — TAMSULOSIN HYDROCHLORIDE 0.4 MG/1
1 CAPSULE ORAL
Qty: 0 | Refills: 0 | COMMUNITY

## 2019-03-18 RX ORDER — ONDANSETRON 8 MG/1
4 TABLET, FILM COATED ORAL ONCE
Qty: 0 | Refills: 0 | Status: DISCONTINUED | OUTPATIENT
Start: 2019-03-18 | End: 2019-04-02

## 2019-03-18 RX ORDER — OXYCODONE HYDROCHLORIDE 5 MG/1
1 TABLET ORAL
Qty: 20 | Refills: 0 | OUTPATIENT
Start: 2019-03-18 | End: 2019-03-22

## 2019-03-18 RX ORDER — OXYCODONE HYDROCHLORIDE 5 MG/1
1 TABLET ORAL
Qty: 0 | Refills: 0 | COMMUNITY

## 2019-03-18 RX ORDER — HYDROMORPHONE HYDROCHLORIDE 2 MG/ML
0.25 INJECTION INTRAMUSCULAR; INTRAVENOUS; SUBCUTANEOUS
Qty: 0 | Refills: 0 | Status: DISCONTINUED | OUTPATIENT
Start: 2019-03-18 | End: 2019-03-18

## 2019-03-18 RX ORDER — CELECOXIB 200 MG/1
200 CAPSULE ORAL ONCE
Qty: 0 | Refills: 0 | Status: DISCONTINUED | OUTPATIENT
Start: 2019-03-18 | End: 2019-04-02

## 2019-03-18 RX ORDER — DOCUSATE SODIUM 100 MG
1 CAPSULE ORAL
Qty: 0 | Refills: 0 | COMMUNITY

## 2019-03-18 RX ORDER — FENOFIBRATE,MICRONIZED 130 MG
1 CAPSULE ORAL
Qty: 0 | Refills: 0 | COMMUNITY

## 2019-03-18 RX ORDER — CELECOXIB 200 MG/1
200 CAPSULE ORAL ONCE
Qty: 0 | Refills: 0 | Status: COMPLETED | OUTPATIENT
Start: 2019-03-18 | End: 2019-03-18

## 2019-03-18 RX ADMIN — CELECOXIB 200 MILLIGRAM(S): 200 CAPSULE ORAL at 11:32

## 2019-03-18 RX ADMIN — Medication 1000 MILLIGRAM(S): at 11:32

## 2019-03-18 NOTE — BRIEF OPERATIVE NOTE - OPERATION/FINDINGS
See dictated report.  Findings - L ulnar nerve compression   Procedure - L ulnar nerve release at elbow and wrist

## 2019-03-18 NOTE — ASU DISCHARGE PLAN (ADULT/PEDIATRIC) - CARE PROVIDER_API CALL
Prerna Mullins (MD; MPH)  Orthopaedic Surgery  [address], [address]  [place] [address]  Phone: [phone]  Fax: [phone]  Follow Up Time:

## 2019-03-18 NOTE — ASU DISCHARGE PLAN (ADULT/PEDIATRIC) - CALL YOUR DOCTOR IF YOU HAVE ANY OF THE FOLLOWING:
Numbness, tingling, color or temperature change to extremity Numbness, tingling, color or temperature change to extremity/Pain not relieved by Medications/Fever greater than (need to indicate Fahrenheit or Celsius)/Bleeding that does not stop

## 2019-03-18 NOTE — BRIEF OPERATIVE NOTE - NSICDXBRIEFPROCEDURE_GEN_ALL_CORE_FT
PROCEDURES:  Decompression, Guyon's canal 18-Mar-2019 12:19:02  Farideh Shannon  Left cubital tunnel release 18-Mar-2019 12:18:55  Farideh Shannon

## 2019-03-26 ENCOUNTER — TRANSCRIPTION ENCOUNTER (OUTPATIENT)
Age: 64
End: 2019-03-26

## 2019-03-29 ENCOUNTER — APPOINTMENT (OUTPATIENT)
Dept: ORTHOPEDIC SURGERY | Facility: CLINIC | Age: 64
End: 2019-03-29

## 2019-04-01 PROBLEM — E78.1 PURE HYPERGLYCERIDEMIA: Chronic | Status: ACTIVE | Noted: 2019-03-11

## 2019-04-01 PROBLEM — M79.89 OTHER SPECIFIED SOFT TISSUE DISORDERS: Chronic | Status: ACTIVE | Noted: 2019-03-11

## 2019-04-04 ENCOUNTER — APPOINTMENT (OUTPATIENT)
Dept: ORTHOPEDIC SURGERY | Facility: CLINIC | Age: 64
End: 2019-04-04
Payer: MEDICAID

## 2019-04-04 PROCEDURE — 99024 POSTOP FOLLOW-UP VISIT: CPT

## 2019-04-19 ENCOUNTER — APPOINTMENT (OUTPATIENT)
Dept: ORTHOPEDIC SURGERY | Facility: CLINIC | Age: 64
End: 2019-04-19

## 2019-04-24 ENCOUNTER — APPOINTMENT (OUTPATIENT)
Dept: ORTHOPEDIC SURGERY | Facility: CLINIC | Age: 64
End: 2019-04-24
Payer: MEDICAID

## 2019-04-24 VITALS — WEIGHT: 218 LBS | HEIGHT: 71 IN | BODY MASS INDEX: 30.52 KG/M2

## 2019-04-24 PROCEDURE — 72040 X-RAY EXAM NECK SPINE 2-3 VW: CPT

## 2019-04-24 PROCEDURE — 99214 OFFICE O/P EST MOD 30 MIN: CPT | Mod: 24

## 2019-04-24 NOTE — DISCUSSION/SUMMARY
[de-identified] : Left hand symptoms have improved. He has some shoulder issues on the right. We'll try some therapy. Should this fail to alleviate his symptoms I recommended following up with one of my sports medicine colleagues. Followup in 3 months time or sooner with any changes or worsening of the symptoms.

## 2019-04-24 NOTE — PHYSICAL EXAM
[de-identified] : Examination of the cervical spine reveals no midline or paraspinal tenderness to palpation. He does have increased pain and more difficulty with overhead activity on the right. Well healed posterior cervical incision. Improved left hand strength. Otherwise stable neurologic examination. Reduced swelling of his left leg. [de-identified] : He also x-rays demonstrates instrument the C3-7 posterior cervical fusion

## 2019-04-24 NOTE — HISTORY OF PRESENT ILLNESS
[de-identified] : Mr. Horton presents to the office for a follow-up visit s/p C3-7 posterior fusion on 12/7/17.  He complains of neck stiffness and right shoulder pain.

## 2019-05-24 NOTE — ASU PREOP CHECKLIST - PATIENT PROBLEMS/NEEDS
Ice frequently.  Ibuprofen/Tylenol.  Saline rinses in nose.  Avoid blowing nose for next few days.  Monitor for any headaches, fatigue, signs of concussion.  Follow up as needed.  
Patient expressed no known problems or needs

## 2019-06-06 ENCOUNTER — APPOINTMENT (OUTPATIENT)
Dept: ORTHOPEDIC SURGERY | Facility: CLINIC | Age: 64
End: 2019-06-06
Payer: MEDICAID

## 2019-06-06 DIAGNOSIS — G56.32 LESION OF RADIAL NERVE, LEFT UPPER LIMB: ICD-10-CM

## 2019-06-06 PROCEDURE — 99024 POSTOP FOLLOW-UP VISIT: CPT

## 2019-07-08 ENCOUNTER — APPOINTMENT (OUTPATIENT)
Dept: ORTHOPEDIC SURGERY | Facility: CLINIC | Age: 64
End: 2019-07-08
Payer: MEDICAID

## 2019-07-17 ENCOUNTER — APPOINTMENT (OUTPATIENT)
Dept: ORTHOPEDIC SURGERY | Facility: CLINIC | Age: 64
End: 2019-07-17
Payer: MEDICAID

## 2019-07-17 PROCEDURE — 99214 OFFICE O/P EST MOD 30 MIN: CPT

## 2019-07-17 PROCEDURE — 73564 X-RAY EXAM KNEE 4 OR MORE: CPT | Mod: LT

## 2019-07-17 NOTE — DISCUSSION/SUMMARY
[de-identified] : fatty calcification of the left popliteal musculotendinous junction - do not believe it is causing compression of adjacent neurovascular structions\par left leg has diffuse edema, suggest medical management\par mild left knee chondromalacia\par hamstring strain\par mylogram evidence of possible foot drop on MRI, clinically minimal to no weakness or evidence of foot drop\par mild hip flexor weakness on left\par \par I discussed my findings on history, exam and radiology.\par \par I reviewed the anatomy and function of the periarticular muscles and tendons, patella, ligaments, menisci and cartilage of the knee using models, images and diagrams. Given the current findings for the patient, I recommend proceeding with non-operative management of the shoulder consisting of the following:\par \par Patient education about the knee motions causing pain and possibly injury for activity modification\par \par Avoid deep knee bends and squating to prevent exacerbating knee injury\par \par Ice or warm compress\par \par Physical therapy prescription with VMO/Hip Abductor/Lumbar Core strengthening, ROM stretching, mobilization, modalities, HEP\par \par suggest spine correlation with hip flexor weakness\par \par The patient verifies their understanding the the visit, diagnosis and plan. They agree with the treatment plan and will contact the office with any questions or problems.\par \par FU after PT completion if unimproved\par

## 2019-07-17 NOTE — PHYSICAL EXAM
[de-identified] : Physical Examination\par General: well nourished, in no acute distress, alert and oriented to person, place and time\par Psychiatric: normal mood and affect, no abnormal movements or speech patterns\par Eyes: vision intact - glasses\par Throat: no thyromegaly\par Lymph: no enlarged nodes, no lymphedema in extremity\par Respiratory: no wheezing, no shortness of breath with ambulation\par Cardiac: no cardiac leg swelling, 2+ peripheral pulses\par Neurology: normal gross sensation in extremities to light touch\par Abdomen: soft, non-tender, tympanic, no masses\par \par Musculoskeletal Examination\par Ambulation	+ antalgic gait, - assistive devices\par \par Knee			Right			Left\par General\par      Swelling/Deformity	normal			normal	\par      Skin			normal			normal\par      Erythema		-			-\par      Standing Alignment	neutral			neutral\par      Effusion		none			trace\par Range of Motion\par      Hip			full painless ROM		full painless ROM\par      Knee Flexion		120			120\par      Knee Extension	0			0\par Patella\par      J Sign		-			-\par      Quad Medial/Lateral	1/1 1/1\par      Apprehension		-			-\par      Jett's		-			-\par      Grind Sign		-			-\par      Crepitus		-			-\par Palpation\par      Medial Joint Line	-			-\par      Medial Fem Condyle	-			-\par      Lateral Joint Line	-			-\par      Quad Tendon		-			-\par      Patella Tendon	-			-\par      Medial Patella		-			-\par      Lateral Patella 	-			-\par      Posterior Knee	-			-\par Ligamentous\par      Varus @ 0° / 30°	-/-			-/-\par      Valgus @ 0° / 30°	-/-			-/-\par      Lachman		-			-\par      Pivot Shift		-			-\par      Anterior Drawer	-			-\par      Posterior Drawer	-			-\par Meniscus\par      Michelle		-			=\par      Flexion Pinch		-			-\par Strength Examination/Atrophy\par      Hip Flexors 		5+			4+\par      Quadriceps		5+			5-\par      Hamstring		5+			5+\par      Tibialis Anterior	5+			5-\par      Achilles/Soleus	5+			5+\par Sensation\par      Deep Peroneal	normal			normal\par      Superficial Peroneal 	normal			normal\par      Sural  		normal			normal\par      Posterior Tibial 	normal			normal\par      Saphneous 		normal			normal\par Pulses\par      DP			2+			2+\par  [de-identified] : 5 views of the affected Left knee (standing AP, flexing standing AP, 30degree flexed lateral, 0degree lateral, sunrise view) \par demonstrate:\par There is mild symmetrical narrowing\par Small osteophytic lipping\par Trace suprapatellar effusion\par Mild to moderate medial patellofemoral joint space loss without evidence of tilt [or] subluxation on sunrise view\par Normal soft tissue density\par Otherwise normal osseous bone structure without fracture or dislocation\par \par \par CT scan bilateral lower extremity with angiography dated 11-23-80\par \par Visit small hypointense circular tubular shaped structure that is circumfrentially high bone intensity possibly calcification in the posterior lateral tibial plafond hugging the medial border of the fibula head traversing proximal to distal and progressing of the moving more medial next to the neurovascular structures - structure possibly popliteal muscle/tendon junction region\par \par \par Impression\par CENTRAL ARTERIAL SYSTEM: \par \par The distal aortic bifurcation and iliac arteries are widely patent. \par \par RIGHT LOWER EXTREMITY: \par \par Femoral, popliteal, and infrapopliteal arteries are widely patent. There is \par three-vessel runoff in the calf. \par \par LEFT LOWER EXTREMITY: \par \par Femoral, popliteal and infrapopliteal arteries are widely patent. There is \par three-vessel runoff in the calf. No AVM or AV fistula is present. \par \par ADDITIONAL FINDINGS: \par Status post right total hip arthroplasty. \par Dystrophic calcifications in the left semimembranosus muscle and in the \par right Achilles tendon. \par Fat with rim calcification in the left popliteus muscle. \par T2 bright lesion adjacent to the left fibular head on outside MRI is not \par vascularized by the current exam. \par \par IMPRESSION: No CT evidence of left calf AVM. \par \par \par \par MRI right knee from [facility] dated 6-11-18\par My impression of the images:\par Quality of the MRI is ok\par Medial Meniscus ok\par Lateral Meniscus ok\par There is mild chondral loss in the medial compartments\par There Is Not bone marrow edema/subchondral cysts in the medial compartments\par LCL is intact\par MCL is intact\par ACL is intact\par PCL is intact \par Quadriceps Tendon is intact\par Patella Tendon is intact\par non fluid filled structure in the same region seen on CT, bright on T1 hypointense on T2\par \par no official read\par \par \par 5 views of the affected Left knee (standing AP, flexing standing AP, 30degree flexed lateral, 0degree lateral, sunrise view)\par were ordered, obtained and evaluated by myself today and\par demonstrate:\par There is mild symmetrical narrowing\par Small osteophytic lipping\par Trace suprapatellar effusion\par Mild to moderate medial patellofemoral joint space loss without evidence of tilt [or] subluxation on sunrise view\par Normal soft tissue density\par Otherwise normal osseous bone structure without fracture or dislocation\par \par MRI myelogram read from [facility] of the left peroneal nerve demonstrates cyst on the peroneal nerve around the lateral fibula.\par

## 2019-07-17 NOTE — HISTORY OF PRESENT ILLNESS
[de-identified] : CC left knee\par \par HPI 62 yo male right HD presents for CSI and PT FU of gradual onset of 1.5 years of activity-related pain in the posterior medial left knee [without injury]. The pain is worse or the same, and rated a 5 out of 10, described as aching, [without radiation]. Medication makes the pain better and standing and ambulation makes the pain worse. The patient reports associated symptoms of swelling of the leg. The patient - pain at night affecting sleep, and - similar pain previously.\par \par The patient has tried the following treatments:\par Activity modification	+\par Ice/Compression  	+\par Braces    		-\par Nsaids    		+\par Physical Therapy 	+ moderate help\par Cortisone Injection	+ good help\par Visco Injection		-\par Arthroscopy		-\par \par still ~50% better overall. very happy. feels stiff in am and more pain in pm. has mri w cyst on peroneal nerve Left side\par \par Review of Systems is positive for the above musculoskeletal symptoms and is otherwise non-contributory for general, constitutional, psychiatric, neurologic, HEENT, cardiac, respiratory, gastrointestinal, reproductive, lymphatic, and dermatologic complaints.\par \par Consult by Dr Guevara\par \par

## 2019-08-08 NOTE — H&P PST ADULT - SKIN/BREAST
Physical Therapy Daily Progress Note        Elen Cleveland reports 3-4/10 pain today at rest.  Pt reports that the exercises do not bother the back but do not feel great. Pt states she has not been work this past week which has made it easier on her back.         Objective Pt present to PT today with no distress at rest.     Pt tolerated exercises well today with no increased pain in the lumbar spine.       See Exercise, Manual, and Modality Logs for complete treatment.     Assessment/Plan  Pt has not shown much improvement from HEP although responded well to mix of exercise and manual therapy today. Pt would benefit from PT to help improve lumbar mobility and strength to improve pain free function.       Progress per Plan of Care  Assess reaction to treatment           Manual Therapy:    27     mins  15620;  Therapeutic Exercise:    19     mins  66833;     Neuromuscular Veronica:        mins  03050;    Therapeutic Activity:          mins  36047;     Gait Training:        ___  mins  67564;     Ultrasound:          mins  57168;    Electrical Stimulation:         mins  92760 ( );  Dry Needling          mins self-pay    Timed Treatment:   46   mins   Total Treatment:     56   mins    Arnulfo Justice, PT  Physical Therapist         details…

## 2019-08-21 ENCOUNTER — APPOINTMENT (OUTPATIENT)
Dept: ORTHOPEDIC SURGERY | Facility: CLINIC | Age: 64
End: 2019-08-21
Payer: MEDICAID

## 2019-08-21 VITALS
WEIGHT: 218 LBS | DIASTOLIC BLOOD PRESSURE: 83 MMHG | BODY MASS INDEX: 30.52 KG/M2 | SYSTOLIC BLOOD PRESSURE: 138 MMHG | HEIGHT: 71 IN | HEART RATE: 67 BPM

## 2019-08-21 PROCEDURE — 99214 OFFICE O/P EST MOD 30 MIN: CPT

## 2019-08-21 PROCEDURE — 72040 X-RAY EXAM NECK SPINE 2-3 VW: CPT

## 2019-09-09 ENCOUNTER — CHART COPY (OUTPATIENT)
Age: 64
End: 2019-09-09

## 2019-09-09 ENCOUNTER — APPOINTMENT (OUTPATIENT)
Dept: ORTHOPEDIC SURGERY | Facility: CLINIC | Age: 64
End: 2019-09-09
Payer: MEDICAID

## 2019-09-09 PROCEDURE — 99214 OFFICE O/P EST MOD 30 MIN: CPT

## 2019-10-25 ENCOUNTER — APPOINTMENT (OUTPATIENT)
Dept: ORTHOPEDIC SURGERY | Facility: CLINIC | Age: 64
End: 2019-10-25
Payer: MEDICAID

## 2019-10-25 DIAGNOSIS — M71.22 SYNOVIAL CYST OF POPLITEAL SPACE [BAKER], LEFT KNEE: ICD-10-CM

## 2019-10-25 PROCEDURE — 99213 OFFICE O/P EST LOW 20 MIN: CPT

## 2019-10-25 NOTE — HISTORY OF PRESENT ILLNESS
[de-identified] : CC left knee\par \par HPI 62 yo male right HD presents for PT FU of gradual onset of 1.5 years of activity-related pain in the posterior medial left knee [without injury]. The pain is worse or the same, and rated a 5 out of 10, described as aching, [without radiation]. Medication makes the pain better and standing and ambulation makes the pain worse. The patient reports associated symptoms of swelling of the leg. The patient - pain at night affecting sleep, and - similar pain previously.\par \par The patient has tried the following treatments:\par Activity modification	+\par Ice/Compression  	+\par Braces    		-\par Nsaids    		+\par Physical Therapy 	+ moderate help\par Cortisone Injection	+ good help\par Visco Injection		-\par Arthroscopy		-\par \par still ~50% better overall. very happy. feels stiff in am and more pain in pm. wants more PT feels the knee is weak\par \par Review of Systems is positive for the above musculoskeletal symptoms and is otherwise non-contributory for general, constitutional, psychiatric, neurologic, HEENT, cardiac, respiratory, gastrointestinal, reproductive, lymphatic, and dermatologic complaints.\par \par Consult by Dr Guevara\par \par

## 2019-10-25 NOTE — PHYSICAL EXAM
[de-identified] : Physical Examination\par General: well nourished, in no acute distress, alert and oriented to person, place and time\par Psychiatric: normal mood and affect, no abnormal movements or speech patterns\par Eyes: vision intact - glasses\par Throat: no thyromegaly\par Lymph: no enlarged nodes, no lymphedema in extremity\par Respiratory: no wheezing, no shortness of breath with ambulation\par Cardiac: no cardiac leg swelling, 2+ peripheral pulses\par Neurology: normal gross sensation in extremities to light touch\par Abdomen: soft, non-tender, tympanic, no masses\par \par Musculoskeletal Examination\par Ambulation	+ antalgic gait, - assistive devices\par \par Knee			Right			Left\par General\par      Swelling/Deformity	normal			normal	\par      Skin			normal			normal\par      Erythema		-			-\par      Standing Alignment	neutral			neutral\par      Effusion		none			trace\par Range of Motion\par      Hip			full painless ROM		full painless ROM\par      Knee Flexion		120			120\par      Knee Extension	0			0\par Patella\par      J Sign		-			-\par      Quad Medial/Lateral	1/1 1/1\par      Apprehension		-			-\par      Jett's		-			-\par      Grind Sign		-			-\par      Crepitus		-			-\par Palpation\par      Medial Joint Line	-			-\par      Medial Fem Condyle	-			-\par      Lateral Joint Line	-			-\par      Quad Tendon		-			-\par      Patella Tendon	-			-\par      Medial Patella		-			-\par      Lateral Patella 	-			-\par      Posterior Knee	-			-\par Ligamentous\par      Varus @ 0° / 30°	-/-			-/-\par      Valgus @ 0° / 30°	-/-			-/-\par      Lachman		-			-\par      Pivot Shift		-			-\par      Anterior Drawer	-			-\par      Posterior Drawer	-			-\par Meniscus\par      Michelle		-			=\par      Flexion Pinch		-			-\par Strength Examination/Atrophy\par      Hip Flexors 		5+			4+\par      Quadriceps		5+			5-\par      Hamstring		5+			5+\par      Tibialis Anterior	5+			5-\par      Achilles/Soleus	5+			5+\par Sensation\par      Deep Peroneal	normal			normal\par      Superficial Peroneal 	normal			normal\par      Sural  		normal			normal\par      Posterior Tibial 	normal			normal\par      Saphneous 		normal			normal\par Pulses\par      DP			2+			2+\par  [de-identified] : 5 views of the affected Left knee (standing AP, flexing standing AP, 30degree flexed lateral, 0degree lateral, sunrise view) \par demonstrate:\par There is mild symmetrical narrowing\par Small osteophytic lipping\par Trace suprapatellar effusion\par Mild to moderate medial patellofemoral joint space loss without evidence of tilt [or] subluxation on sunrise view\par Normal soft tissue density\par Otherwise normal osseous bone structure without fracture or dislocation\par \par \par CT scan bilateral lower extremity with angiography dated 11-23-80\par \par Visit small hypointense circular tubular shaped structure that is circumfrentially high bone intensity possibly calcification in the posterior lateral tibial plafond hugging the medial border of the fibula head traversing proximal to distal and progressing of the moving more medial next to the neurovascular structures - structure possibly popliteal muscle/tendon junction region\par \par \par Impression\par CENTRAL ARTERIAL SYSTEM: \par \par The distal aortic bifurcation and iliac arteries are widely patent. \par \par RIGHT LOWER EXTREMITY: \par \par Femoral, popliteal, and infrapopliteal arteries are widely patent. There is \par three-vessel runoff in the calf. \par \par LEFT LOWER EXTREMITY: \par \par Femoral, popliteal and infrapopliteal arteries are widely patent. There is \par three-vessel runoff in the calf. No AVM or AV fistula is present. \par \par ADDITIONAL FINDINGS: \par Status post right total hip arthroplasty. \par Dystrophic calcifications in the left semimembranosus muscle and in the \par right Achilles tendon. \par Fat with rim calcification in the left popliteus muscle. \par T2 bright lesion adjacent to the left fibular head on outside MRI is not \par vascularized by the current exam. \par IMPRESSION: No CT evidence of left calf AVM. \par \par MRI right knee from [facility] dated 6-11-18\par My impression of the images:\par Quality of the MRI is ok\par Medial Meniscus ok\par Lateral Meniscus ok\par There is mild chondral loss in the medial compartments\par There Is Not bone marrow edema/subchondral cysts in the medial compartments\par LCL is intact\par MCL is intact\par ACL is intact\par PCL is intact \par Quadriceps Tendon is intact\par Patella Tendon is intact\par non fluid filled structure in the same region seen on CT, bright on T1 hypointense on T2\par \par no official read\par \par \par 5 views of the affected Left knee (standing AP, flexing standing AP, 30degree flexed lateral, 0degree lateral, sunrise view)\par 7-17-19\par demonstrate:\par There is mild symmetrical narrowing\par Small osteophytic lipping\par Trace suprapatellar effusion\par Mild to moderate medial patellofemoral joint space loss without evidence of tilt [or] subluxation on sunrise view\par Normal soft tissue density\par Otherwise normal osseous bone structure without fracture or dislocation\par \par MRI myelogram read from [facility] of the left peroneal nerve demonstrates cyst on the peroneal nerve around the lateral fibula.\par

## 2019-10-25 NOTE — DISCUSSION/SUMMARY
[de-identified] : fatty calcification of the left popliteal musculotendinous junction - do not believe it is causing compression of adjacent neurovascular structions\par left leg has diffuse edema, suggest medical management\par mild left knee chondromalacia\par hamstring strain\par mylogram evidence of possible foot drop on MRI, clinically minimal to no weakness or evidence of foot drop\par mild hip flexor weakness on left\par \par I discussed my findings on history, exam and radiology.\par \par I reviewed the anatomy and function of the periarticular muscles and tendons, patella, ligaments, menisci and cartilage of the knee using models, images and diagrams. Given the current findings for the patient, I recommend continuing with non-operative management of the knee consisting of the following:\par \par Patient education about the knee motions causing pain and possibly injury for activity modification\par \par Avoid deep knee bends and squating to prevent exacerbating knee injury\par \par Ice or warm compress\par \par Physical therapy prescription with VMO/Hip Abductor/Lumbar Core strengthening, ROM stretching, mobilization, modalities, HEP\par \par The patient verifies their understanding the the visit, diagnosis and plan. They agree with the treatment plan and will contact the office with any questions or problems.\par \par FU after PT completion if unimproved\par

## 2020-03-14 ENCOUNTER — EMERGENCY (EMERGENCY)
Facility: HOSPITAL | Age: 65
LOS: 1 days | Discharge: HOME | End: 2020-03-14
Admitting: EMERGENCY MEDICINE
Payer: MEDICAID

## 2020-03-14 VITALS
SYSTOLIC BLOOD PRESSURE: 155 MMHG | TEMPERATURE: 98 F | DIASTOLIC BLOOD PRESSURE: 83 MMHG | RESPIRATION RATE: 16 BRPM | OXYGEN SATURATION: 98 % | HEART RATE: 70 BPM

## 2020-03-14 DIAGNOSIS — Z98.89 OTHER SPECIFIED POSTPROCEDURAL STATES: Chronic | ICD-10-CM

## 2020-03-14 DIAGNOSIS — Z98.1 ARTHRODESIS STATUS: Chronic | ICD-10-CM

## 2020-03-14 DIAGNOSIS — Z98.890 OTHER SPECIFIED POSTPROCEDURAL STATES: Chronic | ICD-10-CM

## 2020-03-14 PROCEDURE — 99283 EMERGENCY DEPT VISIT LOW MDM: CPT

## 2020-03-14 RX ORDER — OFLOXACIN 0.3 %
1 DROPS OPHTHALMIC (EYE) ONCE
Refills: 0 | Status: COMPLETED | OUTPATIENT
Start: 2020-03-14 | End: 2020-03-14

## 2020-03-14 RX ORDER — OXYCODONE HYDROCHLORIDE 5 MG/1
5 TABLET ORAL ONCE
Refills: 0 | Status: DISCONTINUED | OUTPATIENT
Start: 2020-03-14 | End: 2020-03-14

## 2020-03-14 RX ORDER — ATENOLOL 25 MG/1
100 TABLET ORAL ONCE
Refills: 0 | Status: COMPLETED | OUTPATIENT
Start: 2020-03-14 | End: 2020-03-14

## 2020-03-14 RX ORDER — BACLOFEN 100 %
10 POWDER (GRAM) MISCELLANEOUS ONCE
Refills: 0 | Status: COMPLETED | OUTPATIENT
Start: 2020-03-14 | End: 2020-03-14

## 2020-03-14 RX ORDER — FUROSEMIDE 40 MG
20 TABLET ORAL ONCE
Refills: 0 | Status: COMPLETED | OUTPATIENT
Start: 2020-03-14 | End: 2020-03-14

## 2020-03-14 RX ORDER — OXYCODONE HYDROCHLORIDE 5 MG/1
20 TABLET ORAL ONCE
Refills: 0 | Status: DISCONTINUED | OUTPATIENT
Start: 2020-03-14 | End: 2020-03-14

## 2020-03-14 RX ORDER — GABAPENTIN 400 MG/1
600 CAPSULE ORAL ONCE
Refills: 0 | Status: COMPLETED | OUTPATIENT
Start: 2020-03-14 | End: 2020-03-14

## 2020-03-14 RX ADMIN — ATENOLOL 100 MILLIGRAM(S): 25 TABLET ORAL at 13:23

## 2020-03-14 RX ADMIN — Medication 10 MILLIGRAM(S): at 13:22

## 2020-03-14 RX ADMIN — GABAPENTIN 600 MILLIGRAM(S): 400 CAPSULE ORAL at 13:24

## 2020-03-14 RX ADMIN — OXYCODONE HYDROCHLORIDE 20 MILLIGRAM(S): 5 TABLET ORAL at 13:22

## 2020-03-14 RX ADMIN — Medication 20 MILLIGRAM(S): at 13:22

## 2020-03-14 RX ADMIN — Medication 1 DROP(S): at 13:44
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
## 2020-03-14 NOTE — ED PROVIDER NOTE - PATIENT PORTAL LINK FT
You can access the FollowMyHealth Patient Portal offered by [facility] by registering at the following website: [email or web address]. By joining [employer]’s FollowMyHealth portal, you will also be able to view your health information using other applications (apps) compatible with our system.

## 2020-03-14 NOTE — ED PROVIDER NOTE - BILATERAL EYES
clear/No conjunctival erythema. Fluorescin stain is normal L eye, small uptake in patch R lower aspect of sclera not involving iris. PH of B/L eyes is 7.
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
## 2020-03-14 NOTE — ED PROVIDER NOTE - NSFOLLOWUPINSTRUCTIONS_ED_ALL_ED_FT
Use the artificial tears for lubrication 4 times per day in both eyes. Use Ofloxacin drops 4 times a day in your R eye and use Erythromycin for lubrication at night. All of this medication was given to you in ER. You can see the eye clinic on Monday. The address is [address] [place] [address] [id number] [facility] [id number] .  Continue all previously prescribed medications as directed.  Follow up with your primary care physician in 48-72 hours- bring copies of your results.  Return to the ER for worsening or persistent symptoms, and/or ANY NEW OR CONCERNING SYMPTOMS. This inlcudes increasing pain, redness, swelling or for any other symptoms that concern you. If you have issues obtaining follow up, please call: [phone] to obtain a doctor or specialist who takes your insurance in your area.  You may call [phone] to make an appointment with the internal medicine clinic.

## 2020-04-15 ENCOUNTER — APPOINTMENT (OUTPATIENT)
Dept: ORTHOPEDIC SURGERY | Facility: CLINIC | Age: 65
End: 2020-04-15

## 2020-05-04 ENCOUNTER — APPOINTMENT (OUTPATIENT)
Dept: UROLOGY | Facility: CLINIC | Age: 65
End: 2020-05-04

## 2020-05-05 ENCOUNTER — APPOINTMENT (OUTPATIENT)
Dept: ORTHOPEDIC SURGERY | Facility: CLINIC | Age: 65
End: 2020-05-05

## 2020-06-18 ENCOUNTER — NON-APPOINTMENT (OUTPATIENT)
Age: 65
End: 2020-06-18

## 2020-06-24 ENCOUNTER — APPOINTMENT (OUTPATIENT)
Dept: ORTHOPEDIC SURGERY | Facility: CLINIC | Age: 65
End: 2020-06-24
Payer: MEDICAID

## 2020-06-24 VITALS
HEIGHT: 71 IN | BODY MASS INDEX: 30.66 KG/M2 | SYSTOLIC BLOOD PRESSURE: 145 MMHG | HEART RATE: 66 BPM | DIASTOLIC BLOOD PRESSURE: 87 MMHG | WEIGHT: 219 LBS

## 2020-06-24 VITALS — TEMPERATURE: 97.3 F

## 2020-06-24 PROCEDURE — 20611 DRAIN/INJ JOINT/BURSA W/US: CPT | Mod: LT

## 2020-06-24 PROCEDURE — 99214 OFFICE O/P EST MOD 30 MIN: CPT | Mod: 25

## 2020-06-24 PROCEDURE — 73560 X-RAY EXAM OF KNEE 1 OR 2: CPT | Mod: LT

## 2020-06-24 NOTE — DISCUSSION/SUMMARY
[de-identified] : fatty calcification of the left popliteal musculotendinous junction - do not believe it is causing compression of adjacent neurovascular structions\par left leg has diffuse edema, suggest medical management\par mild left knee chondromalacia\par hamstring strain\par mylogram evidence of possible foot drop on MRI, clinically minimal to no weakness or evidence of foot drop\par mild hip flexor weakness on left\par \par I discussed my findings on history, exam and radiology.\par \par I reviewed the anatomy and function of the periarticular muscles and tendons, patella, ligaments, menisci and cartilage of the knee using models, images and diagrams. Given the current findings for the patient, I recommend continuing with non-operative management of the knee consisting of the following:\par \par Patient education about the knee motions causing pain and possibly injury for activity modification\par \par Avoid deep knee bends and squating to prevent exacerbating knee injury\par \par Ice or warm compress\par \par Physical therapy prescription with VMO/Hip Abductor/Lumbar Core strengthening, ROM stretching, mobilization, modalities, HEP\par \par The patient was prescribed Diclofenac PO non-steroidal anti-inflammatory medication. 50mg tablets twice daily to be taken for at least 1-2 weeks in a row and then PRN afterwards. Risks and benefits were discussed and include but not limited to renal damage and GI ulceration and bleeding.  They were advised to take with food to limit stomach upset as well as warned to stop the medication if worsening gastric pain or dizziness or other side effects. Also to immediately stop the medication and seek appropriate medical attention if any severe stomach ache, gastritis, black/red vomit, black/red stools or any other medical concern.\par \par Procedure Note:\par \par Verbal consent was obtained for an arthrocentesis and intra-articular corticosteroid injection of the LEFT knee, after the risks and benefits were discussed with the patient. Potential adverse effects were discussed including but not limited to bleeding, skin/joint infection, local skin reactions including bleaching, bruising, stiffness, soreness, vasovagal episodes, transient hyperglycemia, avascular necrosis, pseudo-septic type reactions, post injection joint pain, allergic reaction to product or anesthetic and other rare but potential adverse effects along with benefits including decreased pain and improved stability prior to obtaining verbal informed consent. It was also discussed that for some patients the treatment is ineffective and there are no guarantees that the patient will experience improvement as the result of the injection. In rare occasions the injection can cause worsening of pain.\par \par The use of a Sonosite 15-6 MHz linear transducer with live ultrasound guidance of the knee was necessary given the patient's BMI and local body habitus overlying and obscuring the accurate identification of normal body bony anatomy used to identify the injection site and the depth of soft tissue envelope necessitating a longer than normal needle to reach the joint space, and to confirm the location of the needle tip and intra-articular delivery of the medication. Without the use of live ultrasound guidance the injection would have been more difficult and place the patient's neurovascular structures at risk from the longer needle needed to traverse the soft tissue envelope.\par \par A 6 inch bolster was placed underneath the knee to place the knee in a slightly flexed position. The superolateral injection site was identified using the ultrasound probe to identify the suprapatellar space and superior boarder of the patella first longitudinally and then transversely. The injection site was marked and prepped with a ChloraPrep swab and anesthetized with ethylchloride skin anesthesia. Using sterile technique a 20g 3 1/2 in spinal needle with 3 cc total of 1cc 1% lidocaine without epinephrine, 1cc 0.25% Marcaine without epinephrine and 1cc of 40mg/mL Kenalog was passed through the injection site towards the suprapatellar space under live ultrasound guidance and noted to penetrate the joint capsule. The medication was injected without resistance under live ultrasound visualization and noted to flow into the suprapatellar joint space. The injection site was sterilely dressed, there was minimal blood loss. The patient tolerated this procedure without any complications done by myself. Images were recorded and saved.\par \par The patient has been advised that if they notice any worsening of symptoms or any problems to contact me and seek care from a qualified medical professional. The patient was instructed to ice the knee and take NSAID medication on an as needed basis if the patient feels discomfort.\par \par for leg swelling recommend medical follow up as pitting edema in the lower extremity is not an orthopedically treated problem\par \par The patient verifies their understanding the the visit, diagnosis and plan. They agree with the treatment plan and will contact the office with any questions or problems.\par \par FU after PT completion if unimproved\par

## 2020-06-24 NOTE — HISTORY OF PRESENT ILLNESS
[de-identified] : CC left knee\par \par HPI 64 yo male right HD presents for left leg swelling FU of gradual onset of 1.5 years of activity-related pain in the posterior medial left knee [without injury]. The pain is worse or the same, and rated a 5 out of 10, described as aching, [without radiation]. Medication makes the pain better and standing and ambulation makes the pain worse. The patient reports associated symptoms of swelling of the leg. The patient - pain at night affecting sleep, and - similar pain previously.\par \par The patient has tried the following treatments:\par Activity modification	+\par Ice/Compression  	+\par Braces    		-\par Nsaids    		+\par Physical Therapy 	+ moderate help\par Cortisone Injection	+ good help\par Visco Injection		-\par Arthroscopy		-\par \par knee pain returning\par sent by pmd for leg swelling.\par \par Review of Systems is positive for the above musculoskeletal symptoms and is otherwise non-contributory for general, constitutional, psychiatric, neurologic, HEENT, cardiac, respiratory, gastrointestinal, reproductive, lymphatic, and dermatologic complaints.\par \par Consult by Dr Guevara\par \par

## 2020-06-24 NOTE — PHYSICAL EXAM
[de-identified] : 5 views of the affected Left knee (standing AP, flexing standing AP, 30degree flexed lateral, 0degree lateral, sunrise view) \par demonstrate:\par There is mild symmetrical narrowing\par Small osteophytic lipping\par Trace suprapatellar effusion\par Mild to moderate medial patellofemoral joint space loss without evidence of tilt [or] subluxation on sunrise view\par Normal soft tissue density\par Otherwise normal osseous bone structure without fracture or dislocation\par \par \par CT scan bilateral lower extremity with angiography dated 11-23-80\par \par Visit small hypointense circular tubular shaped structure that is circumfrentially high bone intensity possibly calcification in the posterior lateral tibial plafond hugging the medial border of the fibula head traversing proximal to distal and progressing of the moving more medial next to the neurovascular structures - structure possibly popliteal muscle/tendon junction region\par \par \par Impression\par CENTRAL ARTERIAL SYSTEM: \par \par The distal aortic bifurcation and iliac arteries are widely patent. \par \par RIGHT LOWER EXTREMITY: \par \par Femoral, popliteal, and infrapopliteal arteries are widely patent. There is \par three-vessel runoff in the calf. \par \par LEFT LOWER EXTREMITY: \par \par Femoral, popliteal and infrapopliteal arteries are widely patent. There is \par three-vessel runoff in the calf. No AVM or AV fistula is present. \par \par ADDITIONAL FINDINGS: \par Status post right total hip arthroplasty. \par Dystrophic calcifications in the left semimembranosus muscle and in the \par right Achilles tendon. \par Fat with rim calcification in the left popliteus muscle. \par T2 bright lesion adjacent to the left fibular head on outside MRI is not \par vascularized by the current exam. \par IMPRESSION: No CT evidence of left calf AVM. \par \par MRI right knee from [facility] dated 6-11-18\par My impression of the images:\par Quality of the MRI is ok\par Medial Meniscus ok\par Lateral Meniscus ok\par There is mild chondral loss in the medial compartments\par There Is Not bone marrow edema/subchondral cysts in the medial compartments\par LCL is intact\par MCL is intact\par ACL is intact\par PCL is intact \par Quadriceps Tendon is intact\par Patella Tendon is intact\par non fluid filled structure in the same region seen on CT, bright on T1 hypointense on T2\par \par no official read\par \par \par 5 views of the affected Left knee (standing AP, flexing standing AP, 30degree flexed lateral, 0degree lateral, sunrise view)\par 7-17-19\par demonstrate:\par There is mild symmetrical narrowing\par Small osteophytic lipping\par Trace suprapatellar effusion\par Mild to moderate medial patellofemoral joint space loss without evidence of tilt [or] subluxation on sunrise view\par Normal soft tissue density\par Otherwise normal osseous bone structure without fracture or dislocation\par \par MRI myelogram read from [facility] of the left peroneal nerve demonstrates cyst on the peroneal nerve around the lateral fibula.\par \par \par 3 views of the affected Left knee (standing AP, 30degree flexed lateral, sunrise view)\par Dated  6-11-20 were evaluated by myself today and\par demonstrate:\par There is mild symmetric narrowing\par small osteophytic lipping\par trace suprapatellar effusion\par mild to moderate patellofemoral joint space loss without evidence of tilt [or] subluxation on sunrise view\par Normal soft tissue density\par Otherwise normal osseous bone structure without fracture or dislocation\par \par 1 views of the affected Left knee (flexing standing AP)\par were ordered, obtained and evaluated by myself today and\par demonstrate:\par There is mild symmetric narrowing\par small osteophytic lipping\par  [de-identified] : Physical Examination\par General: well nourished, in no acute distress, alert and oriented to person, place and time\par Psychiatric: normal mood and affect, no abnormal movements or speech patterns\par Eyes: vision intact - glasses\par Throat: no thyromegaly\par Lymph: no enlarged nodes, no lymphedema in extremity\par Respiratory: no wheezing, no shortness of breath with ambulation\par Cardiac: no cardiac leg swelling, 2+ peripheral pulses\par Neurology: normal gross sensation in extremities to light touch\par Abdomen: soft, non-tender, tympanic, no masses\par \par Musculoskeletal Examination\par Ambulation	+ antalgic gait, - assistive devices\par \par Knee			Right			Left\par General\par      Swelling/Deformity	normal			pitting edema to the mid calf\par      Skin			normal			normal\par      Erythema		-			-\par      Standing Alignment	neutral			neutral\par      Effusion		none			trace\par Range of Motion\par      Hip			full painless ROM		full painless ROM\par      Knee Flexion		120			120\par      Knee Extension	0			0\par Patella\par      J Sign		-			-\par      Quad Medial/Lateral	1/1 1/1\par      Apprehension		-			-\par      Jett's		-			-\par      Grind Sign		-			-\par      Crepitus		-			-\par Palpation\par      Medial Joint Line	-			-\par      Medial Fem Condyle	-			-\par      Lateral Joint Line	-			-\par      Quad Tendon		-			-\par      Patella Tendon	-			-\par      Medial Patella		-			-\par      Lateral Patella 	-			-\par      Posterior Knee	-			-\par Ligamentous\par      Varus @ 0° / 30°	-/-			-/-\par      Valgus @ 0° / 30°	-/-			-/-\par      Lachman		-			-\par      Pivot Shift		-			-\par      Anterior Drawer	-			-\par      Posterior Drawer	-			-\par Meniscus\par      Michelle		-			=\par      Flexion Pinch		-			-\par Strength Examination/Atrophy\par      Hip Flexors 		5+			4+\par      Quadriceps		5+			5-\par      Hamstring		5+			5+\par      Tibialis Anterior	5+			5-\par      Achilles/Soleus	5+			5+\par Sensation\par      Deep Peroneal	normal			normal\par      Superficial Peroneal 	normal			normal\par      Sural  		normal			normal\par      Posterior Tibial 	normal			normal\par      Saphneous 		normal			normal\par Pulses\par      DP			2+			2+\par

## 2020-06-25 ENCOUNTER — APPOINTMENT (OUTPATIENT)
Dept: CARDIOLOGY | Facility: CLINIC | Age: 65
End: 2020-06-25
Payer: MEDICAID

## 2020-06-25 VITALS
TEMPERATURE: 98 F | WEIGHT: 219 LBS | HEART RATE: 67 BPM | SYSTOLIC BLOOD PRESSURE: 100 MMHG | DIASTOLIC BLOOD PRESSURE: 60 MMHG | HEIGHT: 71 IN | OXYGEN SATURATION: 99 % | BODY MASS INDEX: 30.66 KG/M2

## 2020-06-25 PROCEDURE — 93306 TTE W/DOPPLER COMPLETE: CPT

## 2020-06-25 PROCEDURE — 93971 EXTREMITY STUDY: CPT

## 2020-06-25 PROCEDURE — 99214 OFFICE O/P EST MOD 30 MIN: CPT

## 2020-06-25 PROCEDURE — 93000 ELECTROCARDIOGRAM COMPLETE: CPT

## 2020-06-25 RX ORDER — DICLOFENAC SODIUM 75 MG/1
75 TABLET, DELAYED RELEASE ORAL
Qty: 60 | Refills: 1 | Status: DISCONTINUED | COMMUNITY
Start: 2017-06-14 | End: 2020-06-25

## 2020-06-25 RX ORDER — TRAMADOL HYDROCHLORIDE 50 MG/1
50 TABLET, COATED ORAL
Qty: 15 | Refills: 0 | Status: DISCONTINUED | COMMUNITY
Start: 2016-09-08 | End: 2020-06-25

## 2020-06-25 RX ORDER — TRAMADOL HYDROCHLORIDE 50 MG/1
50 TABLET, COATED ORAL
Qty: 15 | Refills: 0 | Status: DISCONTINUED | COMMUNITY
Start: 2017-02-17 | End: 2020-06-25

## 2020-06-25 RX ORDER — FLUTICASONE PROPIONATE 0.5 MG/G
0.05 CREAM TOPICAL
Qty: 60 | Refills: 0 | Status: DISCONTINUED | COMMUNITY
Start: 2017-04-05 | End: 2020-06-25

## 2020-06-25 RX ORDER — BENZOYL PEROXIDE 5 G/100G
5 LIQUID TOPICAL
Qty: 142 | Refills: 0 | Status: DISCONTINUED | COMMUNITY
Start: 2017-04-05 | End: 2020-06-25

## 2020-06-25 RX ORDER — DIAZEPAM 10 MG/1
10 TABLET ORAL
Qty: 7 | Refills: 0 | Status: DISCONTINUED | COMMUNITY
Start: 2018-01-31 | End: 2020-06-25

## 2020-06-25 RX ORDER — AMLODIPINE BESYLATE 5 MG/1
5 TABLET ORAL
Qty: 30 | Refills: 0 | Status: DISCONTINUED | COMMUNITY
Start: 2017-03-28 | End: 2020-06-25

## 2020-06-25 RX ORDER — ATENOLOL 50 MG/1
50 TABLET ORAL DAILY
Qty: 90 | Refills: 1 | Status: ACTIVE | COMMUNITY
Start: 2017-08-18

## 2020-06-25 RX ORDER — NAPROXEN 500 MG/1
500 TABLET ORAL
Qty: 20 | Refills: 0 | Status: DISCONTINUED | COMMUNITY
Start: 2016-09-08 | End: 2020-06-25

## 2020-06-25 RX ORDER — ATENOLOL 100 MG/1
100 TABLET ORAL
Qty: 30 | Refills: 0 | Status: DISCONTINUED | COMMUNITY
Start: 2016-10-25 | End: 2020-06-25

## 2020-06-25 RX ORDER — CLINDAMYCIN PHOSPHATE 10 MG/ML
1 LOTION TOPICAL
Qty: 60 | Refills: 0 | Status: DISCONTINUED | COMMUNITY
Start: 2017-04-05 | End: 2020-06-25

## 2020-06-25 RX ORDER — GABAPENTIN 300 MG/1
300 CAPSULE ORAL
Qty: 90 | Refills: 0 | Status: DISCONTINUED | COMMUNITY
Start: 2016-10-25 | End: 2020-06-25

## 2020-06-25 RX ORDER — CYCLOBENZAPRINE HYDROCHLORIDE 10 MG/1
10 TABLET, FILM COATED ORAL
Qty: 60 | Refills: 0 | Status: DISCONTINUED | COMMUNITY
Start: 2016-11-15 | End: 2020-06-25

## 2020-06-25 RX ORDER — AMLODIPINE BESYLATE 10 MG/1
10 TABLET ORAL
Qty: 30 | Refills: 0 | Status: DISCONTINUED | COMMUNITY
Start: 2016-05-09 | End: 2020-06-25

## 2020-06-25 RX ORDER — OXAPROZIN 600 MG/1
600 TABLET ORAL
Qty: 30 | Refills: 0 | Status: DISCONTINUED | COMMUNITY
Start: 2016-06-06 | End: 2020-06-25

## 2020-06-25 RX ORDER — DIAZEPAM 5 MG/1
5 TABLET ORAL
Qty: 10 | Refills: 0 | Status: DISCONTINUED | COMMUNITY
Start: 2018-01-31 | End: 2020-06-25

## 2020-06-25 RX ORDER — DOCUSATE SODIUM 100 MG/1
100 CAPSULE, LIQUID FILLED ORAL
Qty: 60 | Refills: 0 | Status: DISCONTINUED | COMMUNITY
Start: 2016-12-27 | End: 2020-06-25

## 2020-06-25 RX ORDER — OXYCODONE AND ACETAMINOPHEN 5; 325 MG/1; MG/1
5-325 TABLET ORAL
Qty: 10 | Refills: 0 | Status: DISCONTINUED | COMMUNITY
Start: 2016-09-22 | End: 2020-06-25

## 2020-06-25 RX ORDER — DICLOFENAC SODIUM 50 MG/1
50 TABLET, DELAYED RELEASE ORAL
Qty: 60 | Refills: 1 | Status: DISCONTINUED | COMMUNITY
Start: 2019-01-09 | End: 2020-06-25

## 2020-06-25 RX ORDER — TRIAMTERENE AND HYDROCHLOROTHIAZIDE 25; 37.5 MG/1; MG/1
37.5-25 TABLET ORAL
Qty: 30 | Refills: 0 | Status: DISCONTINUED | COMMUNITY
Start: 2016-10-25 | End: 2020-06-25

## 2020-06-25 NOTE — PHYSICAL EXAM
[General Appearance - Well Developed] : well developed [Normal Appearance] : normal appearance [Well Groomed] : well groomed [General Appearance - Well Nourished] : well nourished [No Deformities] : no deformities [General Appearance - In No Acute Distress] : no acute distress [Normal Conjunctiva] : the conjunctiva exhibited no abnormalities [Normal Oral Mucosa] : normal oral mucosa [No Oral Pallor] : no oral pallor [Eyelids - No Xanthelasma] : the eyelids demonstrated no xanthelasmas [Normal Jugular Venous A Waves Present] : normal jugular venous A waves present [No Oral Cyanosis] : no oral cyanosis [Normal Jugular Venous V Waves Present] : normal jugular venous V waves present [No Jugular Venous Weber A Waves] : no jugular venous weber A waves [Heart Rate And Rhythm] : heart rate and rhythm were normal [Heart Sounds] : normal S1 and S2 [Murmurs] : no murmurs present [Respiration, Rhythm And Depth] : normal respiratory rhythm and effort [Abdomen Soft] : soft [Exaggerated Use Of Accessory Muscles For Inspiration] : no accessory muscle use [Auscultation Breath Sounds / Voice Sounds] : lungs were clear to auscultation bilaterally [Abdomen Mass (___ Cm)] : no abdominal mass palpated [Abdomen Tenderness] : non-tender [Abnormal Walk] : normal gait [Gait - Sufficient For Exercise Testing] : the gait was sufficient for exercise testing [Nail Clubbing] : no clubbing of the fingernails [Cyanosis, Localized] : no localized cyanosis [Petechial Hemorrhages (___cm)] : no petechial hemorrhages [Skin Color & Pigmentation] : normal skin color and pigmentation [] : no rash [No Venous Stasis] : no venous stasis [Skin Lesions] : no skin lesions [No Skin Ulcers] : no skin ulcer [No Xanthoma] : no  xanthoma was observed [Oriented To Time, Place, And Person] : oriented to person, place, and time [Affect] : the affect was normal [Mood] : the mood was normal [No Anxiety] : not feeling anxious

## 2020-06-29 LAB
ALBUMIN SERPL ELPH-MCNC: 4.7 G/DL
ALP BLD-CCNC: 58 U/L
ALT SERPL-CCNC: 29 U/L
ANION GAP SERPL CALC-SCNC: 10 MMOL/L
AST SERPL-CCNC: 21 U/L
BASOPHILS # BLD AUTO: 0.02 K/UL
BASOPHILS NFR BLD AUTO: 0.2 %
BILIRUB SERPL-MCNC: 0.4 MG/DL
BUN SERPL-MCNC: 15 MG/DL
CALCIUM SERPL-MCNC: 9.7 MG/DL
CHLORIDE SERPL-SCNC: 105 MMOL/L
CO2 SERPL-SCNC: 27 MMOL/L
CREAT SERPL-MCNC: 1.28 MG/DL
EOSINOPHIL # BLD AUTO: 0 K/UL
EOSINOPHIL NFR BLD AUTO: 0 %
GLUCOSE SERPL-MCNC: 107 MG/DL
HCT VFR BLD CALC: 48.1 %
HGB BLD-MCNC: 14.1 G/DL
IMM GRANULOCYTES NFR BLD AUTO: 0.4 %
LYMPHOCYTES # BLD AUTO: 1.7 K/UL
LYMPHOCYTES NFR BLD AUTO: 15.7 %
MAN DIFF?: NORMAL
MCHC RBC-ENTMCNC: 25.9 PG
MCHC RBC-ENTMCNC: 29.3 GM/DL
MCV RBC AUTO: 88.4 FL
MONOCYTES # BLD AUTO: 0.87 K/UL
MONOCYTES NFR BLD AUTO: 8 %
NEUTROPHILS # BLD AUTO: 8.2 K/UL
NEUTROPHILS NFR BLD AUTO: 75.7 %
NT-PROBNP SERPL-MCNC: 157 PG/ML
OSMOLALITY SERPL: 303 MOSMOL/KG
PLATELET # BLD AUTO: 213 K/UL
POTASSIUM SERPL-SCNC: 5 MMOL/L
PROT SERPL-MCNC: 7.1 G/DL
RBC # BLD: 5.44 M/UL
RBC # FLD: 14.3 %
SODIUM SERPL-SCNC: 142 MMOL/L
WBC # FLD AUTO: 10.83 K/UL

## 2020-07-01 NOTE — HISTORY OF PRESENT ILLNESS
[FreeTextEntry1] : Pt presents today as a new patient referred by ortho Dr. Fulton for left leg swelling. \par The patient presents for evaluation of high blood pressure. Patient is currently tolerating the current  antihypertensive regime and they deny headaches, stiff neck, visual changes, pedal Edema or PND. They also are here for follow-up of elevated cholesterol. Patient is currently tolerating medication and denies muscle pain, joint pain, back pain, tea colored urine ,nausea, vomiting, abdominal pain or diarrhea. The patient is trying to follow a low cholesterol diet.\par The patient presents today for evaluation of complaints of leg Edema patient states that his leg has been swollen for about 2 weeks now.  Patient has had support hose in the past for leg edema. They admit to high dietary sodium intake recently. The patient denies any chest pain, shortness of breath, PND. Orthopnea, dizziness, nausea, vomiting, lightheadedness, abdominal pain, or fever. Pt takes Lasix 20mg. Pt sees vascular doctor as well. \par Pt had bloodwork done on Monday with his PCP Dr. Sullivan.

## 2020-07-02 ENCOUNTER — APPOINTMENT (OUTPATIENT)
Dept: CT IMAGING | Facility: CLINIC | Age: 65
End: 2020-07-02
Payer: MEDICAID

## 2020-07-02 ENCOUNTER — OUTPATIENT (OUTPATIENT)
Dept: OUTPATIENT SERVICES | Facility: HOSPITAL | Age: 65
LOS: 1 days | End: 2020-07-02
Payer: MEDICAID

## 2020-07-02 DIAGNOSIS — R60.0 LOCALIZED EDEMA: ICD-10-CM

## 2020-07-02 DIAGNOSIS — R59.9 ENLARGED LYMPH NODES, UNSPECIFIED: ICD-10-CM

## 2020-07-02 DIAGNOSIS — Z98.89 OTHER SPECIFIED POSTPROCEDURAL STATES: Chronic | ICD-10-CM

## 2020-07-02 DIAGNOSIS — Z98.890 OTHER SPECIFIED POSTPROCEDURAL STATES: Chronic | ICD-10-CM

## 2020-07-02 DIAGNOSIS — Z98.1 ARTHRODESIS STATUS: Chronic | ICD-10-CM

## 2020-07-02 PROCEDURE — 74177 CT ABD & PELVIS W/CONTRAST: CPT

## 2020-07-02 PROCEDURE — 74177 CT ABD & PELVIS W/CONTRAST: CPT | Mod: 26

## 2020-07-13 ENCOUNTER — APPOINTMENT (OUTPATIENT)
Dept: CARDIOLOGY | Facility: CLINIC | Age: 65
End: 2020-07-13
Payer: MEDICAID

## 2020-07-13 ENCOUNTER — NON-APPOINTMENT (OUTPATIENT)
Age: 65
End: 2020-07-13

## 2020-07-13 VITALS
WEIGHT: 219 LBS | OXYGEN SATURATION: 97 % | HEART RATE: 80 BPM | HEIGHT: 71 IN | BODY MASS INDEX: 30.66 KG/M2 | DIASTOLIC BLOOD PRESSURE: 66 MMHG | SYSTOLIC BLOOD PRESSURE: 142 MMHG

## 2020-07-13 DIAGNOSIS — R59.9 ENLARGED LYMPH NODES, UNSPECIFIED: ICD-10-CM

## 2020-07-13 DIAGNOSIS — R79.89 OTHER SPECIFIED ABNORMAL FINDINGS OF BLOOD CHEMISTRY: ICD-10-CM

## 2020-07-13 DIAGNOSIS — Z87.74 PERSONAL HISTORY OF (CORRECTED) CONGENITAL MALFORMATIONS OF HEART AND CIRCULATORY SYSTEM: ICD-10-CM

## 2020-07-13 PROCEDURE — 99214 OFFICE O/P EST MOD 30 MIN: CPT

## 2020-07-13 PROCEDURE — 93000 ELECTROCARDIOGRAM COMPLETE: CPT

## 2020-07-13 RX ORDER — FUROSEMIDE 20 MG/1
20 TABLET ORAL DAILY
Qty: 60 | Refills: 1 | Status: ACTIVE | COMMUNITY
Start: 1900-01-01 | End: 1900-01-01

## 2020-07-14 LAB
ANION GAP SERPL CALC-SCNC: 14 MMOL/L
BASOPHILS # BLD AUTO: 0.02 K/UL
BASOPHILS NFR BLD AUTO: 0.4 %
BUN SERPL-MCNC: 12 MG/DL
CALCIUM SERPL-MCNC: 9.4 MG/DL
CHLORIDE SERPL-SCNC: 103 MMOL/L
CO2 SERPL-SCNC: 27 MMOL/L
CREAT SERPL-MCNC: 1.26 MG/DL
EOSINOPHIL # BLD AUTO: 0.17 K/UL
EOSINOPHIL NFR BLD AUTO: 3.4 %
GLUCOSE SERPL-MCNC: 104 MG/DL
HCT VFR BLD CALC: 48.2 %
HGB BLD-MCNC: 14.6 G/DL
IMM GRANULOCYTES NFR BLD AUTO: 0.2 %
LYMPHOCYTES # BLD AUTO: 1.45 K/UL
LYMPHOCYTES NFR BLD AUTO: 28.8 %
MAN DIFF?: NORMAL
MCHC RBC-ENTMCNC: 25.7 PG
MCHC RBC-ENTMCNC: 30.3 GM/DL
MCV RBC AUTO: 85 FL
MONOCYTES # BLD AUTO: 0.43 K/UL
MONOCYTES NFR BLD AUTO: 8.5 %
NEUTROPHILS # BLD AUTO: 2.95 K/UL
NEUTROPHILS NFR BLD AUTO: 58.7 %
PLATELET # BLD AUTO: 207 K/UL
POTASSIUM SERPL-SCNC: 4.2 MMOL/L
RBC # BLD: 5.67 M/UL
RBC # FLD: 14.4 %
SODIUM SERPL-SCNC: 143 MMOL/L
WBC # FLD AUTO: 5.03 K/UL

## 2020-07-16 ENCOUNTER — RX RENEWAL (OUTPATIENT)
Age: 65
End: 2020-07-16

## 2020-07-16 PROBLEM — R59.9 ENLARGED LYMPH NODE: Status: ACTIVE | Noted: 2020-06-25

## 2020-07-16 NOTE — HISTORY OF PRESENT ILLNESS
[FreeTextEntry1] : Pt presents for follow up for left lower extremity edema. He had negative lower extremity doppler on 6/25. Pt has had no relief from compression stockings. 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  Pt denies any chest pain, shortness of breath, change in color to extremities, or numbness/tingling to lower extremities. \par The patient presents for evaluation of high blood pressure. Patient is currently tolerating the current  antihypertensive regime and they deny headaches, stiff neck, visual changes, pedal Edema or PND. They also are here for follow-up of elevated cholesterol. Patient is currently tolerating medication and and does not complain of new  muscle pain, joint pain, back pain,urinary changes ,nausea, vomiting, abdominal pain or diarrhea. The patient is trying to follow a low cholesterol diet. Atenolol was decreased to 50 mg daily at last visit, however blood pressure is now running high in the office.

## 2020-07-16 NOTE — PHYSICAL EXAM
[Gait - Sufficient For Exercise Testing] : the gait was sufficient for exercise testing [Abnormal Walk] : normal gait [General Appearance - Well Developed] : well developed [Normal Appearance] : normal appearance [Well Groomed] : well groomed [General Appearance - Well Nourished] : well nourished [No Deformities] : no deformities [General Appearance - In No Acute Distress] : no acute distress [Normal Conjunctiva] : the conjunctiva exhibited no abnormalities [Eyelids - No Xanthelasma] : the eyelids demonstrated no xanthelasmas [Normal Oral Mucosa] : normal oral mucosa [No Oral Pallor] : no oral pallor [No Oral Cyanosis] : no oral cyanosis [Normal Jugular Venous A Waves Present] : normal jugular venous A waves present [Normal Jugular Venous V Waves Present] : normal jugular venous V waves present [No Jugular Venous Weber A Waves] : no jugular venous weber A waves [Respiration, Rhythm And Depth] : normal respiratory rhythm and effort [Exaggerated Use Of Accessory Muscles For Inspiration] : no accessory muscle use [Auscultation Breath Sounds / Voice Sounds] : lungs were clear to auscultation bilaterally [Heart Rate And Rhythm] : heart rate and rhythm were normal [Heart Sounds] : normal S1 and S2 [Murmurs] : no murmurs present [Abdomen Soft] : soft [Abdomen Tenderness] : non-tender [Abdomen Mass (___ Cm)] : no abdominal mass palpated [Nail Clubbing] : no clubbing of the fingernails [Cyanosis, Localized] : no localized cyanosis [Petechial Hemorrhages (___cm)] : no petechial hemorrhages [Skin Color & Pigmentation] : normal skin color and pigmentation [] : no rash [No Venous Stasis] : no venous stasis [Skin Lesions] : no skin lesions [No Skin Ulcers] : no skin ulcer [No Xanthoma] : no  xanthoma was observed [Oriented To Time, Place, And Person] : oriented to person, place, and time [Affect] : the affect was normal [Mood] : the mood was normal [No Anxiety] : not feeling anxious [FreeTextEntry1] : Pt has _21+edema, lt legs

## 2020-07-17 ENCOUNTER — RX RENEWAL (OUTPATIENT)
Age: 65
End: 2020-07-17

## 2020-07-24 ENCOUNTER — OUTPATIENT (OUTPATIENT)
Dept: OUTPATIENT SERVICES | Facility: HOSPITAL | Age: 65
LOS: 1 days | End: 2020-07-24

## 2020-07-24 ENCOUNTER — APPOINTMENT (OUTPATIENT)
Dept: UROLOGY | Facility: CLINIC | Age: 65
End: 2020-07-24
Payer: MEDICAID

## 2020-07-24 VITALS — RESPIRATION RATE: 16 BRPM | SYSTOLIC BLOOD PRESSURE: 127 MMHG | DIASTOLIC BLOOD PRESSURE: 80 MMHG | HEART RATE: 62 BPM

## 2020-07-24 VITALS — TEMPERATURE: 97.9 F

## 2020-07-24 DIAGNOSIS — Z98.1 ARTHRODESIS STATUS: Chronic | ICD-10-CM

## 2020-07-24 DIAGNOSIS — Z98.89 OTHER SPECIFIED POSTPROCEDURAL STATES: Chronic | ICD-10-CM

## 2020-07-24 DIAGNOSIS — Z00.8 ENCOUNTER FOR OTHER GENERAL EXAMINATION: ICD-10-CM

## 2020-07-24 DIAGNOSIS — Z98.890 OTHER SPECIFIED POSTPROCEDURAL STATES: Chronic | ICD-10-CM

## 2020-07-24 PROCEDURE — 51798 US URINE CAPACITY MEASURE: CPT

## 2020-07-24 PROCEDURE — 99213 OFFICE O/P EST LOW 20 MIN: CPT | Mod: 25

## 2020-07-24 NOTE — ASSESSMENT
[FreeTextEntry1] : Patient is a 64 yo M who presents for f/u of BPH/LUTS, urge incontinence.\par Controlled on flomax and ditropan.\par \par PVR today low\par Renew medications\par PSA\par F/u 1 yr

## 2020-07-24 NOTE — PHYSICAL EXAM
[General Appearance - Well Developed] : well developed [Normal Appearance] : normal appearance [General Appearance - Well Nourished] : well nourished [Well Groomed] : well groomed [General Appearance - In No Acute Distress] : no acute distress [Oriented To Time, Place, And Person] : oriented to person, place, and time [Affect] : the affect was normal [Mood] : the mood was normal [Not Anxious] : not anxious [Normal Station and Gait] : the gait and station were normal for the patient's age

## 2020-07-24 NOTE — HISTORY OF PRESENT ILLNESS
[FreeTextEntry1] : Patient is a 64 yo M who presents for f/u of BPH/urinary urgency.  \par \par He had bothersome LUTS and UUI after his back surgery in Jan 2017 he has noticed decline in his ability to hold urine.  He had strong urges and occasional urge incontinence episodes - 2 UUI episodes per week.  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  He was started on flomax and ditropan.  Since then he reports significant improvement in urgency and UUI.  He no longer has UUI.  Tolerating medication well.  Good FOS.  He did run out a few wks ago, he is here for refill.\par No dysuria or hematuria.  Nocturia 2-3. \par PSA 0.56 in 10/2017

## 2020-08-02 ENCOUNTER — APPOINTMENT (OUTPATIENT)
Dept: MRI IMAGING | Facility: CLINIC | Age: 65
End: 2020-08-02
Payer: MEDICAID

## 2020-08-02 ENCOUNTER — OUTPATIENT (OUTPATIENT)
Dept: OUTPATIENT SERVICES | Facility: HOSPITAL | Age: 65
LOS: 1 days | End: 2020-08-02
Payer: MEDICAID

## 2020-08-02 DIAGNOSIS — M25.562 PAIN IN LEFT KNEE: ICD-10-CM

## 2020-08-02 DIAGNOSIS — Z98.890 OTHER SPECIFIED POSTPROCEDURAL STATES: Chronic | ICD-10-CM

## 2020-08-02 DIAGNOSIS — Z98.1 ARTHRODESIS STATUS: Chronic | ICD-10-CM

## 2020-08-02 DIAGNOSIS — Z98.89 OTHER SPECIFIED POSTPROCEDURAL STATES: Chronic | ICD-10-CM

## 2020-08-02 PROCEDURE — 73721 MRI JNT OF LWR EXTRE W/O DYE: CPT | Mod: 26,LT

## 2020-08-02 PROCEDURE — 73721 MRI JNT OF LWR EXTRE W/O DYE: CPT

## 2020-08-05 LAB — PSA SERPL-MCNC: 0.42 NG/ML

## 2020-08-12 ENCOUNTER — APPOINTMENT (OUTPATIENT)
Dept: ORTHOPEDIC SURGERY | Facility: CLINIC | Age: 65
End: 2020-08-12
Payer: MEDICAID

## 2020-08-12 VITALS
DIASTOLIC BLOOD PRESSURE: 71 MMHG | SYSTOLIC BLOOD PRESSURE: 114 MMHG | WEIGHT: 219 LBS | HEART RATE: 86 BPM | HEIGHT: 71 IN | BODY MASS INDEX: 30.66 KG/M2

## 2020-08-12 DIAGNOSIS — M67.40 GANGLION, UNSPECIFIED SITE: ICD-10-CM

## 2020-08-12 DIAGNOSIS — M94.262 CHONDROMALACIA, LEFT KNEE: ICD-10-CM

## 2020-08-12 PROCEDURE — 99214 OFFICE O/P EST MOD 30 MIN: CPT

## 2020-08-12 NOTE — HISTORY OF PRESENT ILLNESS
[de-identified] : CC left knee\par \par HPI 62 yo male right HD presents for left leg swelling FU of gradual onset of 1.5 years of activity-related pain in the posterior medial left knee [without injury]. The pain is worse or the same, and rated a 5 out of 10, described as aching, [without radiation]. Medication makes the pain better and standing and ambulation makes the pain worse. The patient reports associated symptoms of swelling of the leg. The patient - pain at night affecting sleep, and - similar pain previously.\par \par The patient has tried the following treatments:\par Activity modification	+\par Ice/Compression  	+\par Braces    		-\par Nsaids    		+\par Physical Therapy 	+ moderate help\par Cortisone Injection	+ good help\par Visco Injection		-\par Arthroscopy		-\par \par knee pain better post injection\par seen cardio wo swelling improvement, now BL with MRI LLE\par \par Review of Systems is positive for the above musculoskeletal symptoms and is otherwise non-contributory for general, constitutional, psychiatric, neurologic, HEENT, cardiac, respiratory, gastrointestinal, reproductive, lymphatic, and dermatologic complaints.\par \par Consult by Dr Guevara\par \par

## 2020-08-12 NOTE — PHYSICAL EXAM
[de-identified] : Physical Examination\par General: well nourished, in no acute distress, alert and oriented to person, place and time\par Psychiatric: normal mood and affect, no abnormal movements or speech patterns\par Eyes: vision intact - glasses\par Throat: no thyromegaly\par Lymph: no enlarged nodes, no lymphedema in extremity\par Respiratory: no wheezing, no shortness of breath with ambulation\par Cardiac: no cardiac leg swelling, 2+ peripheral pulses\par Neurology: normal gross sensation in extremities to light touch\par Abdomen: soft, non-tender, tympanic, no masses\par \par Musculoskeletal Examination\par Ambulation	+ antalgic gait, - assistive devices\par \par Knee			Right			Left\par General\par      Swelling/Deformity	mild pitting edema to the mid calf     pitting edema to the mid calf\par      Skin			normal			normal\par      Erythema		-			-\par      Standing Alignment	neutral			neutral\par      Effusion		none			trace\par Range of Motion\par      Hip			full painless ROM		full painless ROM\par      Knee Flexion		120			120\par      Knee Extension	0			0\par Patella\par      J Sign		-			-\par      Quad Medial/Lateral	1/1 1/1\par      Apprehension		-			-\par      Jett's		-			-\par      Grind Sign		-			-\par      Crepitus		-			-\par Palpation\par      Medial Joint Line	-			-\par      Medial Fem Condyle	-			-\par      Lateral Joint Line	-			-\par      Quad Tendon		-			-\par      Patella Tendon	-			-\par      Medial Patella		-			-\par      Lateral Patella 	-			-\par      Posterior Knee	-			-\par Ligamentous\par      Varus @ 0° / 30°	-/-			-/-\par      Valgus @ 0° / 30°	-/-			-/-\par      Lachman		-			-\par      Pivot Shift		-			-\par      Anterior Drawer	-			-\par      Posterior Drawer	-			-\par Meniscus\par      Michelle		-			=\par      Flexion Pinch		-			-\par Strength Examination/Atrophy\par      Hip Flexors 		5+			4+\par      Quadriceps		5+			5-\par      Hamstring		5+			5+\par      Tibialis Anterior	5+			5-\par      Achilles/Soleus	5+			5+\par Sensation\par      Deep Peroneal	normal			normal\par      Superficial Peroneal 	normal			normal\par      Sural  		normal			normal\par      Posterior Tibial 	normal			normal\par      Saphneous 		normal			normal\par Pulses\par      DP			2+			2+\par  [de-identified] : 5 views of the affected Left knee (standing AP, flexing standing AP, 30degree flexed lateral, 0degree lateral, sunrise view) \par demonstrate:\par There is mild symmetrical narrowing\par Small osteophytic lipping\par Trace suprapatellar effusion\par Mild to moderate medial patellofemoral joint space loss without evidence of tilt [or] subluxation on sunrise view\par Normal soft tissue density\par Otherwise normal osseous bone structure without fracture or dislocation\par \par \par CT scan bilateral lower extremity with angiography dated 11-23-80\par \par Visit small hypointense circular tubular shaped structure that is circumfrentially high bone intensity possibly calcification in the posterior lateral tibial plafond hugging the medial border of the fibula head traversing proximal to distal and progressing of the moving more medial next to the neurovascular structures - structure possibly popliteal muscle/tendon junction region\par \par \par Impression\par CENTRAL ARTERIAL SYSTEM: \par \par The distal aortic bifurcation and iliac arteries are widely patent. \par \par RIGHT LOWER EXTREMITY: \par \par Femoral, popliteal, and infrapopliteal arteries are widely patent. There is \par three-vessel runoff in the calf. \par \par LEFT LOWER EXTREMITY: \par \par Femoral, popliteal and infrapopliteal arteries are widely patent. There is \par three-vessel runoff in the calf. No AVM or AV fistula is present. \par \par ADDITIONAL FINDINGS: \par Status post right total hip arthroplasty. \par Dystrophic calcifications in the left semimembranosus muscle and in the \par right Achilles tendon. \par Fat with rim calcification in the left popliteus muscle. \par T2 bright lesion adjacent to the left fibular head on outside MRI is not \par vascularized by the current exam. \par IMPRESSION: No CT evidence of left calf AVM. \par \par MRI right knee from [facility] dated 6-11-18\par My impression of the images:\par Quality of the MRI is ok\par Medial Meniscus ok\par Lateral Meniscus ok\par There is mild chondral loss in the medial compartments\par There Is Not bone marrow edema/subchondral cysts in the medial compartments\par LCL is intact\par MCL is intact\par ACL is intact\par PCL is intact \par Quadriceps Tendon is intact\par Patella Tendon is intact\par non fluid filled structure in the same region seen on CT, bright on T1 hypointense on T2\par \par no official read\par \par \par 5 views of the affected Left knee (standing AP, flexing standing AP, 30degree flexed lateral, 0degree lateral, sunrise view)\par 7-17-19\par demonstrate:\par There is mild symmetrical narrowing\par Small osteophytic lipping\par Trace suprapatellar effusion\par Mild to moderate medial patellofemoral joint space loss without evidence of tilt [or] subluxation on sunrise view\par Normal soft tissue density\par Otherwise normal osseous bone structure without fracture or dislocation\par \par MRI myelogram read from [facility] of the left peroneal nerve demonstrates cyst on the peroneal nerve around the lateral fibula.\par \par \par 3 views of the affected Left knee (standing AP, 30degree flexed lateral, sunrise view)\par Dated  6-11-20\par demonstrate:\par There is mild symmetric narrowing\par small osteophytic lipping\par trace suprapatellar effusion\par mild to moderate patellofemoral joint space loss without evidence of tilt [or] subluxation on sunrise view\par Normal soft tissue density\par Otherwise normal osseous bone structure without fracture or dislocation\par \par 1 views of the affected Left knee (flexing standing AP)\par 6-24-20\par demonstrate:\par There is mild symmetric narrowing\par small osteophytic lipping\par \par \par MRI of the left calf from [facility] date 8-2-20\par My Impression of Images:\par edema in soft tissue distally more than proximally\par small ganglion cyst between tibia and fibula at proximal tib/fib\par no definite meniscal tear\par \par Formal Impression:\par BONE: No acute fracture or osteonecrosis. No focal bone marrow edema. No dislocation. \par \par SOFT TISSUE: Large field of view imaging limits evaluation of the meniscal and ligamentous structures of the knee. Large field of view imaging limits evaluation of the ligamentous structures of the ankle. Small knee joint effusion. Tiny Baker's cyst. Multilobulated ganglion cyst measuring 10 x 11 mm is identified arising anterior to the proximal tibiotalar joint (16:17, 10:28). No tibiotalar joint effusion. Feathery edema in the medial head of the gastrocnemius muscle, new when compared to prior study. No additional focal muscle edema. No muscle atrophy or fatty infiltration. Normal appearance of the imaged tendons. Neurovascular structures are normal in course and caliber. Nonspecific edema in the subcutaneous fat of the lower extremity. No focal drainable fluid collections are identified. \par \par IMPRESSION: \par 1. Feathery edema in the medial head of the gastrocnemius muscle, which may be secondary to muscle strain. \par 2. Nonspecific edema in the subcutaneous fat of the left lower extremity. \par 3. Small knee joint effusion. Tiny Baker's cyst. \par \par \par

## 2020-08-12 NOTE — DISCUSSION/SUMMARY
[de-identified] : fatty calcification of the left popliteal musculotendinous junction - do not believe it is causing compression of adjacent neurovascular structions\par left leg has diffuse edema, now bilateral - not orthopedic in nature\par mild left knee chondromalacia\par hamstring strain resolved\par gastroc strain\par small ganglion cyst between tib/fib proximally\par mylogram evidence of possible foot drop on MRI, clinically minimal to no weakness or evidence of foot drop\par mild hip flexor weakness on left\par \par I discussed my findings on history, exam and radiology.\par \par leg edema, now BL not from orthopedic origin, recommend continue compression stocking\par \par I reviewed the anatomy and function of the periarticular muscles and tendons, patella, ligaments, menisci and cartilage of the knee using models, images and diagrams. Given the current findings for the patient, I recommend continuing with non-operative management of the knee consisting of the following:\par \par Ice or warm compress\par \par Physical therapy prescription with VMO/Hip Abductor/Lumbar Core strengthening, ROM stretching, mobilization, modalities, HEP\par \par prn prescribed Diclofenac PO non-steroidal anti-inflammatory medication. 50mg tablets twice daily to be taken for at least 1-2 weeks in a row and then PRN afterwards. Risks and benefits were discussed and include but not limited to renal damage and GI ulceration and bleeding.  They were advised to take with food to limit stomach upset as well as warned to stop the medication if worsening gastric pain or dizziness or other side effects. Also to immediately stop the medication and seek appropriate medical attention if any severe stomach ache, gastritis, black/red vomit, black/red stools or any other medical concern.\par \par The patient verifies their understanding the the visit, diagnosis and plan. They agree with the treatment plan and will contact the office with any questions or problems.\par \par FU after PT completion if unimproved\par

## 2020-08-19 ENCOUNTER — RX RENEWAL (OUTPATIENT)
Age: 65
End: 2020-08-19

## 2020-08-19 RX ORDER — DICLOFENAC SODIUM 50 MG/1
50 TABLET, DELAYED RELEASE ORAL
Qty: 60 | Refills: 1 | Status: ACTIVE | COMMUNITY
Start: 2020-06-24 | End: 1900-01-01

## 2020-09-08 ENCOUNTER — APPOINTMENT (OUTPATIENT)
Dept: CARDIOLOGY | Facility: CLINIC | Age: 65
End: 2020-09-08
Payer: MEDICAID

## 2020-09-08 ENCOUNTER — LABORATORY RESULT (OUTPATIENT)
Age: 65
End: 2020-09-08

## 2020-09-08 ENCOUNTER — NON-APPOINTMENT (OUTPATIENT)
Age: 65
End: 2020-09-08

## 2020-09-08 VITALS — SYSTOLIC BLOOD PRESSURE: 122 MMHG | HEART RATE: 71 BPM | DIASTOLIC BLOOD PRESSURE: 70 MMHG | OXYGEN SATURATION: 97 %

## 2020-09-08 DIAGNOSIS — M25.562 PAIN IN LEFT KNEE: ICD-10-CM

## 2020-09-08 DIAGNOSIS — R60.0 LOCALIZED EDEMA: ICD-10-CM

## 2020-09-08 DIAGNOSIS — Z23 ENCOUNTER FOR IMMUNIZATION: ICD-10-CM

## 2020-09-08 PROCEDURE — 93000 ELECTROCARDIOGRAM COMPLETE: CPT

## 2020-09-08 PROCEDURE — 99213 OFFICE O/P EST LOW 20 MIN: CPT

## 2020-09-09 LAB
ALBUMIN SERPL ELPH-MCNC: 4.5 G/DL
ALP BLD-CCNC: 61 U/L
ALT SERPL-CCNC: 43 U/L
ANION GAP SERPL CALC-SCNC: 10 MMOL/L
AST SERPL-CCNC: 29 U/L
BILIRUB SERPL-MCNC: 0.6 MG/DL
BUN SERPL-MCNC: 15 MG/DL
CALCIUM SERPL-MCNC: 9.6 MG/DL
CHLORIDE SERPL-SCNC: 101 MMOL/L
CHOLEST SERPL-MCNC: 162 MG/DL
CHOLEST/HDLC SERPL: 4 RATIO
CO2 SERPL-SCNC: 28 MMOL/L
CREAT SERPL-MCNC: 1.05 MG/DL
GLUCOSE SERPL-MCNC: 95 MG/DL
HDLC SERPL-MCNC: 41 MG/DL
LDLC SERPL CALC-MCNC: 74 MG/DL
POTASSIUM SERPL-SCNC: 4.7 MMOL/L
PROT SERPL-MCNC: 7 G/DL
SODIUM SERPL-SCNC: 139 MMOL/L
TRIGL SERPL-MCNC: 239 MG/DL

## 2020-09-18 NOTE — HISTORY OF PRESENT ILLNESS
[FreeTextEntry1] : Pt presents for follow up for left lower extremity edema. Recently had MRI L knee 8/2/2020. Found to have feathery edema in the medial head of the gastrocnemius muscle, which may be secondary to muscle strain, nonspecific edema in the subcutaneous fat of the left lower extremity, small knee joint effusion. Tiny Baker's cyst. Followed by Dr. Fulton. Received injection and will be performing physical therapy.\par Utilizing compression stocking on LLE. 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 Pt denies any chest pain, shortness of breath, change in color to extremities, or numbness/tingling to lower extremities. \par \par The patient presents for evaluation of high blood pressure. Patient is currently tolerating the current antihypertensive regime and they deny headaches, stiff neck, visual changes, PND. They also are here for evaluation of cholesterol. The patient is trying to follow a low cholesterol diet.

## 2020-09-18 NOTE — PHYSICAL EXAM
[General Appearance - Well Developed] : well developed [Normal Appearance] : normal appearance [Well Groomed] : well groomed [General Appearance - Well Nourished] : well nourished [No Deformities] : no deformities [General Appearance - In No Acute Distress] : no acute distress [Normal Conjunctiva] : the conjunctiva exhibited no abnormalities [Eyelids - No Xanthelasma] : the eyelids demonstrated no xanthelasmas [Normal Oral Mucosa] : normal oral mucosa [No Oral Pallor] : no oral pallor [No Oral Cyanosis] : no oral cyanosis [Normal Jugular Venous A Waves Present] : normal jugular venous A waves present [Normal Jugular Venous V Waves Present] : normal jugular venous V waves present [No Jugular Venous Weber A Waves] : no jugular venous weber A waves [Respiration, Rhythm And Depth] : normal respiratory rhythm and effort [Exaggerated Use Of Accessory Muscles For Inspiration] : no accessory muscle use [Heart Rate And Rhythm] : heart rate and rhythm were normal [Auscultation Breath Sounds / Voice Sounds] : lungs were clear to auscultation bilaterally [Heart Sounds] : normal S1 and S2 [Murmurs] : no murmurs present [Abdomen Soft] : soft [Abdomen Tenderness] : non-tender [Abdomen Mass (___ Cm)] : no abdominal mass palpated [Nail Clubbing] : no clubbing of the fingernails [Cyanosis, Localized] : no localized cyanosis [Petechial Hemorrhages (___cm)] : no petechial hemorrhages [No Venous Stasis] : no venous stasis [] : no rash [Skin Color & Pigmentation] : normal skin color and pigmentation [Skin Lesions] : no skin lesions [No Skin Ulcers] : no skin ulcer [No Xanthoma] : no  xanthoma was observed [Mood] : the mood was normal [Affect] : the affect was normal [Oriented To Time, Place, And Person] : oriented to person, place, and time [No Anxiety] : not feeling anxious [FreeTextEntry1] : Pt has 2, 1+edema, left leg, +1 edema right leg

## 2020-10-09 ENCOUNTER — APPOINTMENT (OUTPATIENT)
Dept: ORTHOPEDIC SURGERY | Facility: CLINIC | Age: 65
End: 2020-10-09
Payer: MEDICAID

## 2020-10-09 VITALS — TEMPERATURE: 97.8 F

## 2020-10-09 PROCEDURE — 99214 OFFICE O/P EST MOD 30 MIN: CPT

## 2020-10-09 PROCEDURE — 72040 X-RAY EXAM NECK SPINE 2-3 VW: CPT

## 2020-10-09 NOTE — PHYSICAL EXAM
[Cane] : ambulates with cane [de-identified] : His incision is well-healed. Stable neurologic exam [de-identified] : AP lateral cervical x-rays demonstrates instrument the C3-7 posterior cervical fusion.  No gross change in alignment.\par

## 2020-10-09 NOTE — HISTORY OF PRESENT ILLNESS
[de-identified] : Pt is s/p C3-7 posterior fusion on 12/7/17, right shoulder pain, treated with home exercises He presents today for f/u. \par Pt states right shoulder discomfort has been persistent - feels stiff and is painful with ROM. Also reports chronic neck stiffness. Takes percocet prn and uses cane. Has intermittent left fingers numbness, usually at night. \par No fevers or chills [Ataxia] : no ataxia [Incontinence] : no incontinence [Loss of Dexterity] : good dexterity [Urinary Ret.] : no urinary retention

## 2020-10-09 NOTE — DISCUSSION/SUMMARY
[de-identified] : We discussed further treatment options.  He would like to try some physical therapy for his back and legs.  Prescription was given.  Follow-up afterwards.

## 2020-10-15 ENCOUNTER — APPOINTMENT (OUTPATIENT)
Dept: ORTHOPEDIC SURGERY | Facility: CLINIC | Age: 65
End: 2020-10-15
Payer: MEDICAID

## 2020-10-15 VITALS
DIASTOLIC BLOOD PRESSURE: 78 MMHG | WEIGHT: 220 LBS | HEIGHT: 71 IN | SYSTOLIC BLOOD PRESSURE: 136 MMHG | BODY MASS INDEX: 30.8 KG/M2 | HEART RATE: 71 BPM

## 2020-10-15 PROCEDURE — 99214 OFFICE O/P EST MOD 30 MIN: CPT

## 2020-11-16 NOTE — H&P PST ADULT - VISION (WITH CORRECTIVE LENSES IF THE PATIENT USUALLY WEARS THEM):
Incoming call from patient- had a question about getting another coupon for eliquis as he is running out of medication and will need a refill at the end of the month.
Normal vision: sees adequately in most situations; can see medication labels, newsprint

## 2020-12-21 ENCOUNTER — APPOINTMENT (OUTPATIENT)
Dept: CARDIOLOGY | Facility: CLINIC | Age: 65
End: 2020-12-21

## 2020-12-29 NOTE — H&P PST ADULT - ITE SK HX ROS MEA POS PC
Popliteal nerve Block    Start time: 12/29/2020 10:03 AM  End time: 12/29/2020 10:20 AM  Performed by: Diamond Philip MD  Authorized by: Diamond Philip MD       Pre-procedure:    Indications: at surgeon's request and post-op pain management    Preanesthetic Checklist: patient identified, risks and benefits discussed, site marked, timeout performed, anesthesia consent given and patient being monitored    Timeout Time: 10:03          Block Type:   Block Type:  Popliteal  Laterality:  Left  Monitoring:  Standard ASA monitoring, responsive to questions, oxygen, continuous pulse ox, frequent vital sign checks and heart rate  Injection Technique:  Single shot  Procedures: ultrasound guided and nerve stimulator    Patient Position: supine  Prep: chlorhexidine    Location:  Mid thigh  Needle Type:  Stimuplex  Needle Gauge:  21 G  Needle Localization:  Ultrasound guidance and nerve stimulator  Motor Response comment:   Motor Response: minimal motor response >0.4 mA   Medication Injected:  FentaNYL citrate (PF) injection sedation initial, 100 mcg  midazolam (VERSED) injection, 2 mg  ropivacaine (PF) (NAROPIN)(0.5%) 5 mg/mL injection, 30 mL  dexamethasone (DECADRON) 4 mg/mL injection, 4 mg  Med Admin Time: 12/29/2020 10:20 AM    Assessment:  Number of attempts:  1  Injection Assessment:  Incremental injection every 5 mL, negative aspiration for CSF, ultrasound image on chart, local visualized surrounding nerve on ultrasound, negative aspiration for blood and no intravascular symptoms  Patient tolerance:  Patient tolerated the procedure well with no immediate complications hair loss

## 2021-01-11 ENCOUNTER — APPOINTMENT (OUTPATIENT)
Dept: ORTHOPEDIC SURGERY | Facility: CLINIC | Age: 66
End: 2021-01-11

## 2021-01-14 ENCOUNTER — APPOINTMENT (OUTPATIENT)
Dept: CARDIOLOGY | Facility: CLINIC | Age: 66
End: 2021-01-14
Payer: MEDICAID

## 2021-01-14 VITALS
HEART RATE: 61 BPM | HEIGHT: 71 IN | BODY MASS INDEX: 33.6 KG/M2 | TEMPERATURE: 98.2 F | OXYGEN SATURATION: 97 % | SYSTOLIC BLOOD PRESSURE: 118 MMHG | WEIGHT: 240 LBS | DIASTOLIC BLOOD PRESSURE: 66 MMHG

## 2021-01-14 DIAGNOSIS — R60.0 LOCALIZED EDEMA: ICD-10-CM

## 2021-01-14 DIAGNOSIS — I10 ESSENTIAL (PRIMARY) HYPERTENSION: ICD-10-CM

## 2021-01-14 DIAGNOSIS — E78.5 HYPERLIPIDEMIA, UNSPECIFIED: ICD-10-CM

## 2021-01-14 PROCEDURE — 99072 ADDL SUPL MATRL&STAF TM PHE: CPT

## 2021-01-14 PROCEDURE — 99214 OFFICE O/P EST MOD 30 MIN: CPT

## 2021-01-14 NOTE — PHYSICAL EXAM
[General Appearance - Well Developed] : well developed [Normal Appearance] : normal appearance [Well Groomed] : well groomed [General Appearance - Well Nourished] : well nourished [No Deformities] : no deformities [General Appearance - In No Acute Distress] : no acute distress [Normal Conjunctiva] : the conjunctiva exhibited no abnormalities [Eyelids - No Xanthelasma] : the eyelids demonstrated no xanthelasmas [Normal Oral Mucosa] : normal oral mucosa [No Oral Pallor] : no oral pallor [No Oral Cyanosis] : no oral cyanosis [Normal Jugular Venous A Waves Present] : normal jugular venous A waves present [Normal Jugular Venous V Waves Present] : normal jugular venous V waves present [No Jugular Venous Weber A Waves] : no jugular venous weber A waves [Respiration, Rhythm And Depth] : normal respiratory rhythm and effort [Exaggerated Use Of Accessory Muscles For Inspiration] : no accessory muscle use [Auscultation Breath Sounds / Voice Sounds] : lungs were clear to auscultation bilaterally [Heart Rate And Rhythm] : heart rate and rhythm were normal [Heart Sounds] : normal S1 and S2 [Murmurs] : no murmurs present [Abdomen Soft] : soft [Abdomen Tenderness] : non-tender [Abdomen Mass (___ Cm)] : no abdominal mass palpated [Abnormal Walk] : normal gait [Gait - Sufficient For Exercise Testing] : the gait was sufficient for exercise testing [Nail Clubbing] : no clubbing of the fingernails [Cyanosis, Localized] : no localized cyanosis [Petechial Hemorrhages (___cm)] : no petechial hemorrhages [Skin Color & Pigmentation] : normal skin color and pigmentation [] : no rash [No Venous Stasis] : no venous stasis [Skin Lesions] : no skin lesions [No Skin Ulcers] : no skin ulcer [No Xanthoma] : no  xanthoma was observed [Oriented To Time, Place, And Person] : oriented to person, place, and time [Affect] : the affect was normal [Mood] : the mood was normal [No Anxiety] : not feeling anxious

## 2021-01-17 NOTE — HISTORY OF PRESENT ILLNESS
[FreeTextEntry1] : The patient presents for evaluation of high blood pressure. Patient is currently tolerating the current  antihypertensive regime and they deny headaches, stiff neck, visual changes, pedal Edema or PND. They also are here for follow-up of elevated cholesterol. Patient is currently tolerating medication and and does not complain of new  muscle pain, joint pain, back pain,urinary changes ,nausea, vomiting, abdominal pain or diarrhea. The patient is trying to follow a low cholesterol diet. \par The patient presents with frequency, urgency, nocturia and weak urinary system, but denies hematuria and painful urination.  The course is characterized as constant.  The frequency of the nocturia is 2-3 times each night. \par Pt presents for follow up of b/l lower extremity edema. Pt states that the left leg is more swollen than the right, and he sometimes has pain in the left leg. Pt denies any chest pain, shortness of breath, fever, cough, nausea/vomiting.

## 2021-02-17 ENCOUNTER — APPOINTMENT (OUTPATIENT)
Dept: ORTHOPEDIC SURGERY | Facility: CLINIC | Age: 66
End: 2021-02-17
Payer: MEDICAID

## 2021-02-17 PROCEDURE — 72040 X-RAY EXAM NECK SPINE 2-3 VW: CPT

## 2021-02-17 PROCEDURE — 99213 OFFICE O/P EST LOW 20 MIN: CPT

## 2021-02-17 PROCEDURE — 99072 ADDL SUPL MATRL&STAF TM PHE: CPT

## 2021-02-23 NOTE — DISCUSSION/SUMMARY
[de-identified] : Overall his symptoms are stable.  He will continue with nonsurgical treatment.  Follow-up in 3 to 6 months.

## 2021-02-23 NOTE — PHYSICAL EXAM
[Cane] : ambulates with cane [de-identified] : His incision is well-healed. Stable neurologic exam [de-identified] : AP lateral cervical x-rays demonstrates instrument the C3-7 posterior cervical fusion.  No gross change in alignment.\par

## 2021-02-23 NOTE — HISTORY OF PRESENT ILLNESS
[de-identified] : Mr. Horton presents to the office s/p C3-7 posterior fusion on 12/7/17.  Overall his symptoms are stable. [Ataxia] : no ataxia [Incontinence] : no incontinence [Loss of Dexterity] : good dexterity [Urinary Ret.] : no urinary retention

## 2021-03-02 NOTE — ASU PREOP CHECKLIST - HEIGHT IN FEET
5 Chart reviewed, and case discussed with treatment team.  Per nursing no interval events. Patient offers no complaints. No further aggression on unit yesterday or today.  Patient asked writer “do you have any good news for me” inquiring about bed status at [place].  Patient is informed she is still on waiting list.  Patient remains compliant with all standing medications, received Prolixin Dec 50mg on 2/25/21.  No SE noted.  Good sleep and appetite reported.  Patient endorses delusions, still disorganized, bizarre, with internal preoccupied. Patient seems to be at baseline, symptomatically stable. No clinical worsening.  Denies SI/HI. Waiting for bed at [place].

## 2021-04-12 PROBLEM — G56.22 ULNAR NEUROPATHY OF LEFT UPPER EXTREMITY: Status: ACTIVE | Noted: 2017-05-09

## 2021-04-15 ENCOUNTER — APPOINTMENT (OUTPATIENT)
Dept: ORTHOPEDIC SURGERY | Facility: CLINIC | Age: 66
End: 2021-04-15
Payer: MEDICAID

## 2021-04-15 VITALS
SYSTOLIC BLOOD PRESSURE: 138 MMHG | WEIGHT: 245 LBS | DIASTOLIC BLOOD PRESSURE: 71 MMHG | HEART RATE: 59 BPM | BODY MASS INDEX: 34.3 KG/M2 | HEIGHT: 71 IN

## 2021-04-15 DIAGNOSIS — G56.22 LESION OF ULNAR NERVE, LEFT UPPER LIMB: ICD-10-CM

## 2021-04-15 PROCEDURE — 99072 ADDL SUPL MATRL&STAF TM PHE: CPT

## 2021-04-15 PROCEDURE — 99214 OFFICE O/P EST MOD 30 MIN: CPT

## 2021-04-15 NOTE — ASSESSMENT
[FreeTextEntry1] : Patient is a 65 year old male who has recovered well after left ulnar nerve decompression with intramuscular transposition at the elbow and left ulnar nerve decompression at the wrist on 3/18/19 with remaining intrinsic atrophy but improved clawing of the ring and small fingers. We talked about the nature of the condition and treatment options. We discussed that he can continue stretching exercises to help reduce the clawing of the ring and small fingers, demonstrated in the office today. Follow up in 1 year to monitor, or sooner if needed. \par

## 2021-04-15 NOTE — HISTORY OF PRESENT ILLNESS
[FreeTextEntry1] : 65 year old male presenting for follow up s/p left ulnar nerve decompression with intramuscular transposition at the elbow left ulnar nerve decompression at the wrist on 3/18/19. The patient states that he has been conducting home exercises for the left hand and fingers with relief. He states he last used the anti-claw support 2 months ago which he tolerated well, but it broke. He remains stiff but has seen improvement in extension with hand therapy. His ulnar nerve distribution paresthesias have improved since the surgery. He follows back up in the office about 6 months after his last visit, for re-evaluation of his left wrist.\par \par \par \par

## 2021-04-15 NOTE — ADDENDUM
[FreeTextEntry1] : I, Deisy Corrigan acted solely as a scribe for Dr. Prerna Mullins on 04/15/2021. \par \par All medical record entries made by the Scribe were at my, Dr. Prrena Mullins, direction and personally dictated by me on 04/15/2021. I have personally reviewed the chart and agree that the record accurately reflects my personal performance of the history, physical exam, assessment and plan.

## 2021-04-15 NOTE — PHYSICAL EXAM
[de-identified] : Patient is alert, oriented and in no acute distress. Affect and general appearance are normal and patient is able to answer questions appropriately. On the left, he is able to make a tight fist. He has some loss of the metacarpal arch. Some atrophy in 1st webspace. Minimal clawing of the ring and small fingers.  strength is excellent.  \par \par Neurologic: Median, ulnar, and radial motor and sensory are intact. \par Skin: No cyanosis, clubbing, edema or rashes.\par Vascular: Radial pulses intact.\par Lymphatic: No streaking or epitrochlear adenopathy.

## 2021-06-09 ENCOUNTER — APPOINTMENT (OUTPATIENT)
Dept: ORTHOPEDIC SURGERY | Facility: CLINIC | Age: 66
End: 2021-06-09
Payer: MEDICAID

## 2021-06-09 DIAGNOSIS — M47.10 OTHER SPONDYLOSIS WITH MYELOPATHY, SITE UNSPECIFIED: ICD-10-CM

## 2021-06-09 PROCEDURE — 99213 OFFICE O/P EST LOW 20 MIN: CPT

## 2021-06-09 PROCEDURE — 72040 X-RAY EXAM NECK SPINE 2-3 VW: CPT

## 2021-06-09 NOTE — PHYSICAL EXAM
[Cane] : ambulates with cane [de-identified] : His incision is well-healed. Stable neurologic exam [de-identified] : AP lateral cervical x-rays demonstrates instrument the C3-7 posterior cervical fusion.  No gross change in alignment.\par

## 2021-06-09 NOTE — DISCUSSION/SUMMARY
[de-identified] : Given his persistent symptoms we will obtain MRIs of his cervical and lumbar spine.  Follow-up afterwards.

## 2021-07-07 NOTE — H&P PST ADULT - LAST CARDIAC ANGIOGRAM/IMAGING
Patient able to increase activity level, including sports as tolerated. Use of lateral-J brace as needed.  Follow up with any changes or concerns.  
denies

## 2021-07-22 ENCOUNTER — RX RENEWAL (OUTPATIENT)
Age: 66
End: 2021-07-22

## 2021-08-17 ENCOUNTER — RX RENEWAL (OUTPATIENT)
Age: 66
End: 2021-08-17

## 2021-08-25 NOTE — ASU DISCHARGE PLAN (ADULT/PEDIATRIC) - PATIENT BELONGINGS
[Good candidate] : a good candidate. We should proceed with our protocol for evaluation for kidney transplantation. Patient's belongings returned

## 2021-09-15 ENCOUNTER — APPOINTMENT (OUTPATIENT)
Dept: MRI IMAGING | Facility: CLINIC | Age: 66
End: 2021-09-15

## 2021-09-24 ENCOUNTER — APPOINTMENT (OUTPATIENT)
Dept: ORTHOPEDIC SURGERY | Facility: CLINIC | Age: 66
End: 2021-09-24
Payer: MEDICAID

## 2021-09-24 VITALS
WEIGHT: 230 LBS | HEIGHT: 71 IN | BODY MASS INDEX: 32.2 KG/M2 | HEART RATE: 60 BPM | DIASTOLIC BLOOD PRESSURE: 79 MMHG | SYSTOLIC BLOOD PRESSURE: 135 MMHG

## 2021-09-24 DIAGNOSIS — M17.12 UNILATERAL PRIMARY OSTEOARTHRITIS, LEFT KNEE: ICD-10-CM

## 2021-09-24 PROCEDURE — 99214 OFFICE O/P EST MOD 30 MIN: CPT | Mod: 25

## 2021-09-24 PROCEDURE — 20610 DRAIN/INJ JOINT/BURSA W/O US: CPT | Mod: LT

## 2021-09-24 PROCEDURE — 73562 X-RAY EXAM OF KNEE 3: CPT | Mod: LT

## 2021-10-06 PROBLEM — I10 ESSENTIAL HYPERTENSION: Status: ACTIVE | Noted: 2020-06-25

## 2021-12-02 NOTE — H&P PST ADULT - TOBACCO USE
Never smoker Metronidazole Counseling:  I discussed with the patient the risks of metronidazole including but not limited to seizures, nausea/vomiting, a metallic taste in the mouth, nausea/vomiting and severe allergy.

## 2022-05-02 ENCOUNTER — RX RENEWAL (OUTPATIENT)
Age: 67
End: 2022-05-02

## 2022-05-26 NOTE — H&P PST ADULT - FUNCTIONAL LEVEL PRIOR: COMMUNICATION
#Discharge: do not delete    66M Mixed Ischemic/NICM Cardiomyopathy (EF 25-30% at bseline, s/p BIV-ICD), CAD (medically managed MIs in 2008,2011, Most recent stent in April 2022 to mLAD) presented with multiple near syncope, found to have 41 episodes of VT and admitted initially to cardiac telemetry for further evaluation/management. Ischemic evaluation without new disease and VT ablation without substrate to complete ablation.  Patient being transferred to [facility] for advanced therapies with possible heart transplant or LVAD    Problem List/Main Diagnoses (system-based):    Problem/Recommendation - 1:  ·  Problem: Cardiomyopathy, nonischemic.   ·  Recommendation: Etiology: Likely mostly a nonischemic cardiomyopathy with an ischemic component, now with significant VT  GDMT: Switched coreg to Toprol 25mg BID, Hold entresto at this time given soft BP  Diuretic: Holding diuretics as patient euvolemic   Device: Biv-ICD upgraded 2016   - ECHO 5/21/22: LVEF 10-15%, LVIDD 5.2, abnormal septal motion due to RV pacing, D shaped flattening of IVS due to RV pressure overload, RV function reduced, RA dilated, Mild AI, Mild AS, PASP 51mmHg  - 41 VT episodes with ATP, no shock required, no substrate on EP mapping for ablation and given severely reduced function, no attempt at inducing VT.   - Will obtain RHC in cath lab   - After RHC will initiate transfer for transplant/LVad evaluation at [facility]  - Monitor strict I/O  - Monitor electrolytes to maintain K.4 and MG>2.     Problem/Recommendation - 2:  ·  Problem: Ventricular tachycardia.   ·  Recommendation: As above 41 episodes of VT in setting of presyncopal symptoms, ATP'ed by device.   - Amiodarone 400 mg BID.     Problem/Recommendation - 3:  ·  Problem: CAD (coronary artery disease).   ·  Recommendation: -S/p LAD stent on 4/18/22  -cardiac catheterization on 05/23/2022 w/ Dr. Wagner which showed no occlusive disease: LM normal, proximal LAD mild disease w/ slow flow, mid LAD patent stent w/ slow flow, ostial D1 40-50% (small, unchanged), LCx/OM1 mild disease, and RCA/RPDA mild disease via right radial access.   - ASA/plavix  - High intensity statin, Lipitor 80.     Problem/Recommendation - 4:  ·  Problem: Type 2 diabetes mellitus.   ·  Recommendation: HX of DM, A1c 6.2, would likely benefit from SGLT2 in future. At home glimepiride.  - start farxiga on discharge, currently holding farxiga in setting of jagruti on ckd      Problem/Recommendation - 5:  ·  Problem: Chronic kidney disease, unspecified CKD stage.   ·  Recommendation: Cr 1.91 from 1.8. Previous admission 1.2-1.4.     Inpatient treatment course: 67 y/o male w/ PMHx HTN, HLD, type 2 DM, chronic HFrEF (EF 25-30% by Echo), complete heart block s/p PPM (in 2006, upgraded to BiV-ICD in 09/2016), CAD (s/p recent BERNABE mid LAD at [facility] on 04/18/2022), and stage II CKD (baseline Cr ~1.3) who was referred by Dr. Hess due to intermittent episodes of near syncope w/ palpitations and SOB since his recent cardiac catheterization. Patient's device was interrogated which showed multiple episodes of vtach. Patient underwent cardiac catheterization on 05/23/2022 w/ Dr. Wagner which showed no occlusive disease and patent stent in mid LAD but w/ slow flow. Patient was initiated on GDMT with Entresto but did not tolerate medication as blood pressures were soft in 80s/60s.  Patient switched from coreg 12.5 bid to toprol 25 bid. Started on sumeet 25 daily. Started on Farxiga, discontinued in CCU due to JAGRUTI on CKD, will restart on discharge. Patient underwent EPS on 05/24/2022. Unable to perform ablation as study did not show any endocardial substrate, also did not induce VT due to the severely reduced LVEF. Found to have elevated filling pressures on RHC and transferred to CCU for diuresis. Given lasix 40 IVP x2 with appropriate output and patient now euvolemic. Advanced HF consulted for further evaluation of mixed ischemic/nonischemic cardiomyopathy. Patient will be transferred to [facility] for advanced therapy with possible heart transplant or LVAD.     New medications: toprol 25 bid, spironolactone 25 daily, amiodarone 400 bid, lipitor 80   Discontinued: coreg 12.5 bid     Physical exam on discharge:   GEN: Awake, alert. NAD.   HEENT: negative JVD   RESP: CTA b/l  CV: RRR, S1, s2.  ABD: Soft. NT/ND  EXT: Warm. No edema  NEURO: AAOx3. No focal deficits.    (0) understands/communicates without difficulty #Discharge: do not delete    66M Mixed Ischemic/NICM Cardiomyopathy (EF 25-30% at bseline, s/p BIV-ICD), CAD (medically managed MIs in 2008,2011, Most recent stent in April 2022 to mLAD) presented with multiple near syncope, found to have 41 episodes of VT and admitted initially to cardiac telemetry for further evaluation/management. Ischemic evaluation without new disease and VT ablation without substrate to complete ablation.  Patient being transferred to [facility] for advanced therapies with possible heart transplant or LVAD    Problem List/Main Diagnoses (system-based):   1. Cardiomyopathy, nonischemic.   Etiology: Likely mostly a nonischemic cardiomyopathy with an ischemic component, now with significant VT  GDMT: Switched coreg to Toprol 25mg BID, Hold entresto at this time given soft BP  Diuretic: Holding diuretics as patient euvolemic   Device: Biv-ICD upgraded 2016   ECHO 5/21/22: LVEF 10-15%, LVIDD 5.2, abnormal septal motion due to RV pacing, D shaped flattening of IVS due to RV pressure overload, RV function reduced, RA dilated, Mild AI, Mild AS, PASP 51mmHg  - 41 VT episodes with ATP, no shock required, no substrate on EP mapping for ablation and given severely reduced function, no attempt at inducing VT.   - Will obtain RHC in cath lab   - After RHC will initiate transfer for transplant/LVad evaluation at [facility]  - Monitor strict I/O  - Monitor electrolytes to maintain K.4 and MG>2.    2: Ventricular tachycardia.   As above 41 episodes of VT in setting of presyncopal symptoms, ATP'ed by device.   - Amiodarone 400 mg BID.    3. CAD (coronary artery disease).   S/p LAD stent on 4/18/22  -cardiac catheterization on 05/23/2022 w/ Dr. Wagner which showed no occlusive disease: LM normal, proximal LAD mild disease w/ slow flow, mid LAD patent stent w/ slow flow, ostial D1 40-50% (small, unchanged), LCx/OM1 mild disease, and RCA/RPDA mild disease via right radial access.   - ASA/plavix  - High intensity statin, Lipitor 80.    4. Type 2 diabetes mellitus.   HX of DM, A1c 6.2, would likely benefit from SGLT2 in future. At home glimepiride.  - start farxiga on discharge, currently holding farxiga in setting of jagruti on ckd     5. Chronic kidney disease, unspecified CKD stage.   Cr 1.91 from 1.8. Previous admission 1.2-1.4.     6. gout   TTP to R great toe on admit, s/p prednisone 40 mg PO x1 with resolution   Serum uric acid 14.1 5/22  Plan:   - restarted on allopurinol 75mg daily (decreased dose due to JAGRUTI, home dose is 100mg).      Inpatient treatment course: 67 y/o male w/ PMHx HTN, HLD, type 2 DM, chronic HFrEF (EF 25-30% by Echo), complete heart block s/p PPM (in 2006, upgraded to BiV-ICD in 09/2016), CAD (s/p recent BERNABE mid LAD at [facility] on 04/18/2022), and stage II CKD (baseline Cr ~1.3) who was referred by Dr. Hess due to intermittent episodes of near syncope w/ palpitations and SOB since his recent cardiac catheterization. Patient's device was interrogated which showed multiple episodes of vtach. Patient underwent cardiac catheterization on 05/23/2022 w/ Dr. Wagner which showed no occlusive disease and patent stent in mid LAD but w/ slow flow. Patient was initiated on GDMT with Entresto but did not tolerate medication as blood pressures were soft in 80s/60s.  Patient switched from coreg 12.5 bid to toprol 25 bid. Started on sumeet 25 daily. Started on Farxiga, discontinued in CCU due to JAGRUTI on CKD, will restart on discharge. Patient underwent EPS on 05/24/2022. Unable to perform ablation as study did not show any endocardial substrate, also did not induce VT due to the severely reduced LVEF. Found to have elevated filling pressures on RHC and transferred to CCU for diuresis. Given lasix 40 IVP x2 with appropriate output and patient now euvolemic. Advanced HF consulted for further evaluation of mixed ischemic/nonischemic cardiomyopathy. Patient will be transferred to [facility] for advanced therapy with possible heart transplant or LVAD.     New medications: toprol 25 bid, spironolactone 25 daily, amiodarone 400 bid, lipitor 80   Discontinued: coreg 12.5 bid     Physical exam on discharge:   GEN: Awake, alert. NAD.   HEENT: negative JVD   RESP: CTA b/l  CV: RRR, S1, s2.  ABD: Soft. NT/ND  EXT: Warm. No edema  NEURO: AAOx3. No focal deficits.    #Discharge: do not delete    66M Mixed Ischemic/NICM Cardiomyopathy (EF 25-30% at bseline, s/p BIV-ICD), CAD (medically managed MIs in 2008,2011, Most recent stent in April 2022 to mLAD) presented with multiple near syncope, found to have 41 episodes of VT and admitted initially to cardiac telemetry for further evaluation/management. Ischemic evaluation without new disease and VT ablation without substrate to complete ablation.  Patient being transferred to [facility] for advanced therapies with possible heart transplant or LVAD    Problem List/Main Diagnoses (system-based):   1. Cardiomyopathy, nonischemic.   Etiology: Likely mostly a nonischemic cardiomyopathy with an ischemic component, now with significant VT  GDMT: Switched coreg to Toprol 25mg BID, Hold entresto at this time given soft BP  Diuretic: Holding diuretics as patient euvolemic   Device: Biv-ICD upgraded 2016   ECHO 5/21/22: LVEF 10-15%, LVIDD 5.2, abnormal septal motion due to RV pacing, D shaped flattening of IVS due to RV pressure overload, RV function reduced, RA dilated, Mild AI, Mild AS, PASP 51mmHg  - 41 VT episodes with ATP, no shock required, no substrate on EP mapping for ablation and given severely reduced function, no attempt at inducing VT.   - Will obtain RHC in cath lab   - After RHC will initiate transfer for transplant/LVad evaluation at [facility]  - Monitor strict I/O  - Monitor electrolytes to maintain K.4 and MG>2.    2: Ventricular tachycardia.   As above 41 episodes of VT in setting of presyncopal symptoms, ATP'ed by device.   - Amiodarone 400 mg BID.  - patient has received 4.8g of amio during this hospital stay, patient will finish amio load by end of 5/29/22    3. CAD (coronary artery disease).   S/p LAD stent on 4/18/22  -cardiac catheterization on 05/23/2022 w/ Dr. Wagner which showed no occlusive disease: LM normal, proximal LAD mild disease w/ slow flow, mid LAD patent stent w/ slow flow, ostial D1 40-50% (small, unchanged), LCx/OM1 mild disease, and RCA/RPDA mild disease via right radial access.   - ASA/plavix  - High intensity statin, Lipitor 80.    4. Type 2 diabetes mellitus.   HX of DM, A1c 6.2, would likely benefit from SGLT2 in future. At home glimepiride.  - start farxiga on discharge, currently holding farxiga in setting of jagruti on ckd     5. Chronic kidney disease, unspecified CKD stage.   Cr 1.91 from 1.8. Previous admission 1.2-1.4.     6. gout   TTP to R great toe on admit, s/p prednisone 40 mg PO x1 with resolution   Serum uric acid 14.1 5/22  Plan:   - restarted on allopurinol 75mg daily (decreased dose due to JAGRUTI, home dose is 100mg).      Inpatient treatment course: 67 y/o male w/ PMHx HTN, HLD, type 2 DM, chronic HFrEF (EF 25-30% by Echo), complete heart block s/p PPM (in 2006, upgraded to BiV-ICD in 09/2016), CAD (s/p recent BERNABE mid LAD at [facility] on 04/18/2022), and stage II CKD (baseline Cr ~1.3) who was referred by Dr. Hess due to intermittent episodes of near syncope w/ palpitations and SOB since his recent cardiac catheterization. Patient's device was interrogated which showed multiple episodes of vtach. Patient underwent cardiac catheterization on 05/23/2022 w/ Dr. Wagner which showed no occlusive disease and patent stent in mid LAD but w/ slow flow. Patient was initiated on GDMT with Entresto but did not tolerate medication as blood pressures were soft in 80s/60s.  Patient switched from coreg 12.5 bid to toprol 25 bid. Started on sumeet 25 daily. Started on Farxiga, discontinued in CCU due to JAGRUTI on CKD, will restart on discharge. Patient underwent EPS on 05/24/2022. Unable to perform ablation as study did not show any endocardial substrate, also did not induce VT due to the severely reduced LVEF. Found to have elevated filling pressures on RHC and transferred to CCU for diuresis. Given lasix 40 IVP x2 with appropriate output and patient now euvolemic. Advanced HF consulted for further evaluation of mixed ischemic/nonischemic cardiomyopathy. Patient will be transferred to [facility] for advanced therapy with possible heart transplant or LVAD.     New medications: toprol 25 bid, spironolactone 25 daily, amiodarone 400 bid, lipitor 80   Discontinued: coreg 12.5 bid     Physical exam on discharge:   GEN: Awake, alert. NAD.   HEENT: negative JVD   RESP: CTA b/l  CV: RRR, S1, s2.  ABD: Soft. NT/ND  EXT: Warm. No edema  NEURO: AAOx3. No focal deficits.    #Discharge: do not delete    66M Mixed Ischemic/NICM Cardiomyopathy (EF 25-30% at bseline, s/p BIV-ICD), CAD (medically managed MIs in 2008,2011, Most recent stent in April 2022 to mLAD) presented with multiple near syncope, found to have 41 episodes of VT and admitted initially to cardiac telemetry for further evaluation/management. Ischemic evaluation without new disease and VT ablation without substrate to complete ablation.  Patient being transferred to [facility] for advanced therapies with possible heart transplant or LVAD    Problem List/Main Diagnoses (system-based):   1. Cardiomyopathy, nonischemic.   Etiology: Likely mostly a nonischemic cardiomyopathy with an ischemic component, now with significant VT  GDMT: Switched coreg to Toprol 25mg BID, Hold entresto at this time given soft BP  Diuretic: Holding diuretics as patient euvolemic   Device: Biv-ICD upgraded 2016   ECHO 5/21/22: LVEF 10-15%, LVIDD 5.2, abnormal septal motion due to RV pacing, D shaped flattening of IVS due to RV pressure overload, RV function reduced, RA dilated, Mild AI, Mild AS, PASP 51mmHg  - 41 VT episodes with ATP, no shock required, no substrate on EP mapping for ablation and given severely reduced function, no attempt at inducing VT.   - Will obtain RHC in cath lab   - After RHC will initiate transfer for transplant/LVad evaluation at [facility]  - Monitor strict I/O  - Monitor electrolytes to maintain K.4 and MG>2.    2: Ventricular tachycardia.   As above 41 episodes of VT in setting of presyncopal symptoms, ATP'ed by device.   - Amiodarone 400 mg BID.  - patient has received 4.8g of amio during this hospital stay, patient will finish amio load by end of 5/31/22    3. CAD (coronary artery disease).   S/p LAD stent on 4/18/22  -cardiac catheterization on 05/23/2022 w/ Dr. Wagner which showed no occlusive disease: LM normal, proximal LAD mild disease w/ slow flow, mid LAD patent stent w/ slow flow, ostial D1 40-50% (small, unchanged), LCx/OM1 mild disease, and RCA/RPDA mild disease via right radial access.   - ASA/plavix  - High intensity statin, Lipitor 80.    4. Type 2 diabetes mellitus.   HX of DM, A1c 6.2, would likely benefit from SGLT2 in future. At home glimepiride.  - start farxiga on discharge, currently holding farxiga in setting of jagruti on ckd     5. Chronic kidney disease, unspecified CKD stage.   Cr 1.91 from 1.8. Previous admission 1.2-1.4.     6. gout   TTP to R great toe on admit, s/p prednisone 40 mg PO x1 with resolution   Serum uric acid 14.1 5/22  Plan:   - restarted on allopurinol 75mg daily (decreased dose due to JAGRUTI, home dose is 100mg).      Inpatient treatment course: 67 y/o male w/ PMHx HTN, HLD, type 2 DM, chronic HFrEF (EF 25-30% by Echo), complete heart block s/p PPM (in 2006, upgraded to BiV-ICD in 09/2016), CAD (s/p recent BERNABE mid LAD at [facility] on 04/18/2022), and stage II CKD (baseline Cr ~1.3) who was referred by Dr. Hess due to intermittent episodes of near syncope w/ palpitations and SOB since his recent cardiac catheterization. Patient's device was interrogated which showed multiple episodes of vtach. Patient underwent cardiac catheterization on 05/23/2022 w/ Dr. Wagner which showed no occlusive disease and patent stent in mid LAD but w/ slow flow. Patient was initiated on GDMT with Entresto but did not tolerate medication as blood pressures were soft in 80s/60s.  Patient switched from coreg 12.5 bid to toprol 25 bid. Started on sumeet 25 daily. Started on Farxiga, discontinued in CCU due to JAGRUTI on CKD, will restart on discharge. Patient underwent EPS on 05/24/2022. Unable to perform ablation as study did not show any endocardial substrate, also did not induce VT due to the severely reduced LVEF. Found to have elevated filling pressures on RHC and transferred to CCU for diuresis. Given lasix 40 IVP x2 with appropriate output and patient now euvolemic. Advanced HF consulted for further evaluation of mixed ischemic/nonischemic cardiomyopathy. Patient will be transferred to [facility] for advanced therapy with possible heart transplant or LVAD.     New medications: toprol 25 bid, spironolactone 25 daily, amiodarone 400 bid, lipitor 80   Discontinued: coreg 12.5 bid     Physical exam on discharge:   GEN: Awake, alert. NAD.   HEENT: negative JVD   RESP: CTA b/l  CV: RRR, S1, s2.  ABD: Soft. NT/ND  EXT: Warm. No edema  NEURO: AAOx3. No focal deficits.    #Discharge: do not delete    66M Mixed Ischemic/NICM Cardiomyopathy (EF 25-30% at bseline, s/p BIV-ICD), CAD (medically managed MIs in 2008,2011, Most recent stent in April 2022 to mLAD) presented with multiple near syncope, found to have 41 episodes of VT and admitted initially to cardiac telemetry for further evaluation/management. Ischemic evaluation without new disease and VT ablation without substrate to complete ablation.  Patient being transferred to [facility] for advanced therapies with possible heart transplant or LVAD    Problem List/Main Diagnoses (system-based):   #Cardiomyopathy, nonischemic   Etiology: Likely mostly a nonischemic cardiomyopathy with an ischemic component, now with significant VT  GDMT: Switched coreg to Toprol 25mg BID, Hold entresto at this time given soft BP. restart when appropriate   Diuretic: Holding diuretics as patient euvolemic   Device: Biv-ICD upgraded 2016   ECHO 5/21/22: LVEF 10-15%, LVIDD 5.2, abnormal septal motion due to RV pacing, D shaped flattening of IVS due to RV pressure overload, RV function reduced, RA dilated, Mild AI, Mild AS, PASP 51mmHg  - 41 VT episodes with ATP, no shock required, no substrate on EP mapping for ablation and given severely reduced function, no attempt at inducing VT.   - s/p RHC in cath lab - swan in place   - After RHC will initiate transfer for transplant/LVad evaluation at [facility]  - Monitor strict I/O  - Monitor electrolytes to maintain K.4 and MG>2.  RHC 5/26/2022  RA 13, RV 70/5, PA 69/32 mPAP 46, PCWP 21  Pa sat 47%, Ao sat 94%, CO 2.2, CI 1.15     #Ventricular tachycardia.   As above 41 episodes of VT in setting of presyncopal symptoms, ATP'ed by device.   - Amiodarone 400 mg BID.  - patient has received 4.8g of amio during this hospital stay, patient will finish amio load by end of 5/31/22 and start amio 200 BID at that time     #CAD (coronary artery disease).   S/p LAD stent on 4/18/22  -cardiac catheterization on 05/23/2022 w/ Dr. Wagner which showed no occlusive disease: LM normal, proximal LAD mild disease w/ slow flow, mid LAD patent stent w/ slow flow, ostial D1 40-50% (small, unchanged), LCx/OM1 mild disease, and RCA/RPDA mild disease via right radial access.   - ASA/plavix  - High intensity statin, Lipitor 80.    #Type 2 diabetes mellitus.   HX of DM, A1c 6.2, would likely benefit from SGLT2 in future. At home glimepiride.  - start farxiga on discharge, currently holding farxiga in setting of jagruti on ckd     #Chronic kidney disease, unspecified CKD stage.   Cr 1.91 from 1.8. Previous admission 1.2-1.4.     #Gout   TTP to R great toe on admit, s/p prednisone 40 mg PO x1 with resolution   Serum uric acid 14.1 5/22  Plan:   - restarted on allopurinol 75mg daily (decreased dose due to JAGRUTI, home dose is 100mg).      Inpatient treatment course: 65 y/o male w/ PMHx HTN, HLD, type 2 DM, chronic HFrEF (EF 25-30% by Echo), complete heart block s/p PPM (in 2006, upgraded to BiV-ICD in 09/2016), CAD (s/p recent BERNABE mid LAD at [facility] on 04/18/2022), and stage II CKD (baseline Cr ~1.3) who was referred by Dr. Hess due to intermittent episodes of near syncope w/ palpitations and SOB since his recent cardiac catheterization. Patient's device was interrogated which showed multiple episodes of vtach. Patient underwent cardiac catheterization on 05/23/2022 w/ Dr. Wagner which showed no occlusive disease and patent stent in mid LAD but w/ slow flow. Patient was initiated on GDMT with Entresto but did not tolerate medication as blood pressures were soft in 80s/60s.  Patient switched from coreg 12.5 bid to toprol 25 bid. Started on sumeet 25 daily. Started on Farxiga, discontinued in CCU due to JAGRUTI on CKD, will restart on discharge. Patient underwent EPS on 05/24/2022. Unable to perform ablation as study did not show any endocardial substrate, also did not induce VT due to the severely reduced LVEF. Found to have elevated filling pressures on RHC and transferred to CCU for diuresis. Given lasix 40 IVP x2 with appropriate output and patient now euvolemic. Advanced HF consulted for further evaluation of mixed ischemic/nonischemic cardiomyopathy. Patient will be transferred to [facility] for advanced therapy with possible heart transplant or LVAD.     New medications: toprol 25 bid, spironolactone 25 daily, amiodarone 400 bid until 5/31 followed by maintenance dose of 200 bid, lipitor 80   Discontinued: coreg 12.5 bid     Physical exam on discharge:   GEN: Awake, alert. NAD.   HEENT: negative JVD   RESP: CTA b/l  CV: RRR, S1, s2.  ABD: Soft. NT/ND  EXT: Warm. No edema  NEURO: AAOx3. No focal deficits.

## 2022-05-31 ENCOUNTER — RX RENEWAL (OUTPATIENT)
Age: 67
End: 2022-05-31

## 2022-06-09 ENCOUNTER — RX RENEWAL (OUTPATIENT)
Age: 67
End: 2022-06-09

## 2022-07-01 ENCOUNTER — APPOINTMENT (OUTPATIENT)
Dept: UROLOGY | Facility: CLINIC | Age: 67
End: 2022-07-01

## 2022-08-03 ENCOUNTER — APPOINTMENT (OUTPATIENT)
Dept: ORTHOPEDIC SURGERY | Facility: CLINIC | Age: 67
End: 2022-08-03

## 2022-08-03 VITALS — BODY MASS INDEX: 32.2 KG/M2 | WEIGHT: 230 LBS | HEIGHT: 71 IN

## 2022-08-03 DIAGNOSIS — M54.12 RADICULOPATHY, CERVICAL REGION: ICD-10-CM

## 2022-08-03 PROCEDURE — 99214 OFFICE O/P EST MOD 30 MIN: CPT

## 2022-08-03 PROCEDURE — 72040 X-RAY EXAM NECK SPINE 2-3 VW: CPT

## 2022-08-03 RX ORDER — DIAZEPAM 5 MG/1
5 TABLET ORAL
Qty: 2 | Refills: 0 | Status: ACTIVE | COMMUNITY
Start: 2022-08-03 | End: 1900-01-01

## 2022-08-03 NOTE — PHYSICAL EXAM
[Cane] : ambulates with cane [de-identified] : Examination of the cervical spine reveals no midline or paraspinal tenderness to palpation.  Healed posterior cervical incision no cervical lymphadenopathy. Decreased range of motion with respect to flexion, extension, rotation, and lateral bending. Negative Spurlings. Negative Lhermitte's. Full range of motion bilateral shoulders without evidence of impingement. No instability of bilateral upper extremities.  Cranial nerves II through XII grossly intact. Intact sensation bilateral upper extremities.  4+ /5 deltoids biceps triceps wrist extensors wrist flexors finger flexors and hand intrinsics. 1+ biceps triceps and brachioradialis reflexes.  Positive Qureshi's. 2+ radial pulse. Negative Tinel's over the cubital and carpal tunnel. No skin lesions on the right and left upper extremities.\par \par Examination of the lumbar spine reveals no midline tenderness palpation, step-offs, or skin lesions.  Healed lumbar incision decreased range of motion with respect to flexion, extension, lateral bending, and rotation. No tenderness to palpation of the sciatic notch. No tenderness palpation of the bilateral greater trochanters. No pain with passive internal/external rotation of the hips. No instability of bilateral lower extremities.  Negative LUDIN. Negative straight leg raise bilaterally. No bowstring. Negative femoral stretch.  Grossly intact strength on the right side with 4+ out of 5 strength on the left side.  He appears to have more stiffness with movement of his left lower extremity. Grossly intact sensation to light touch bilateral lower extremities. 1+ patellar and Achilles reflexes. Downgoing Babinski. No clonus. Intact proprioception. Palpable pulses. No skin lesion and no edema on the right and left lower extremities. [de-identified] : AP lateral cervical x-rays demonstrates instrument the C3-7 posterior cervical fusion.  No gross change in alignment.\par

## 2022-08-03 NOTE — HISTORY OF PRESENT ILLNESS
[de-identified] : Mr. Horton presents to the office s/p C3-7 posterior fusion on 12/7/17.  He is complaining of neck pain and bilateral hand numbness for the past 3 months.  [Ataxia] : no ataxia [Incontinence] : no incontinence [Loss of Dexterity] : good dexterity [Urinary Ret.] : no urinary retention

## 2022-08-03 NOTE — DISCUSSION/SUMMARY
[de-identified] : We discussed further treatment options.  Given his worsening symptoms with regards to the upper and lower extremity radiculopathy with recurrent myelopathy I have recommended updated MRIs of his cervical, thoracic, and lumbar spine.  Follow-up afterwards.

## 2022-08-05 ENCOUNTER — APPOINTMENT (OUTPATIENT)
Dept: UROLOGY | Facility: CLINIC | Age: 67
End: 2022-08-05

## 2022-08-05 VITALS
HEART RATE: 78 BPM | TEMPERATURE: 98.1 F | BODY MASS INDEX: 32.2 KG/M2 | SYSTOLIC BLOOD PRESSURE: 161 MMHG | HEIGHT: 71 IN | WEIGHT: 230 LBS | DIASTOLIC BLOOD PRESSURE: 82 MMHG | RESPIRATION RATE: 17 BRPM

## 2022-08-05 PROCEDURE — 99214 OFFICE O/P EST MOD 30 MIN: CPT

## 2022-08-05 PROCEDURE — 51798 US URINE CAPACITY MEASURE: CPT

## 2022-08-05 RX ORDER — OXYBUTYNIN CHLORIDE 10 MG/1
10 TABLET, EXTENDED RELEASE ORAL
Qty: 90 | Refills: 3 | Status: ACTIVE | COMMUNITY
Start: 2017-11-10 | End: 1900-01-01

## 2022-08-05 NOTE — ASSESSMENT
[FreeTextEntry1] : Patient is a 68 yo M who presents for f/u of BPH/LUTS, urge incontinence.\par Controlled on flomax and ditropan.\par \par PVR today low\par Renew medications - d/w pt that if he wishes to trial 1 mo off ditropan would be reasonable, and if no recurrence of urgency/UUI he could just remain on flomax\par PSA\par F/u 1 yr

## 2022-08-05 NOTE — HISTORY OF PRESENT ILLNESS
[FreeTextEntry1] : Patient is a 66 yo M who presents for f/u of BPH/urinary urgency.  \par \par He had bothersome LUTS and UUI after his back surgery in Jan 2017 he has noticed decline in his ability to hold urine.  He had strong urges and occasional urge incontinence episodes - 2 UUI episodes per week.  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  He was started on flomax and ditropan. \par \par Here for med refill.  He has been doing well, stable LUTS since starting meds. Since then he reports significant improvement in urgency and UUI.  He no longer has UUI.  Tolerating medication well.  Good FOS.  \par No dysuria or hematuria.  Nocturia 2-3. \par PSA low in 2020\par He did not follow up since 2020

## 2022-08-08 ENCOUNTER — APPOINTMENT (OUTPATIENT)
Age: 67
End: 2022-08-08

## 2022-08-08 LAB — PSA SERPL-MCNC: 0.5 NG/ML

## 2022-08-24 ENCOUNTER — RESULT REVIEW (OUTPATIENT)
Age: 67
End: 2022-08-24

## 2022-08-24 ENCOUNTER — APPOINTMENT (OUTPATIENT)
Dept: MRI IMAGING | Facility: CLINIC | Age: 67
End: 2022-08-24

## 2022-08-24 ENCOUNTER — OUTPATIENT (OUTPATIENT)
Dept: OUTPATIENT SERVICES | Facility: HOSPITAL | Age: 67
LOS: 1 days | End: 2022-08-24
Payer: COMMERCIAL

## 2022-08-24 DIAGNOSIS — M48.02 SPINAL STENOSIS, CERVICAL REGION: ICD-10-CM

## 2022-08-24 DIAGNOSIS — Z98.89 OTHER SPECIFIED POSTPROCEDURAL STATES: Chronic | ICD-10-CM

## 2022-08-24 DIAGNOSIS — Z98.890 OTHER SPECIFIED POSTPROCEDURAL STATES: Chronic | ICD-10-CM

## 2022-08-24 DIAGNOSIS — M51.36 OTHER INTERVERTEBRAL DISC DEGENERATION, LUMBAR REGION: ICD-10-CM

## 2022-08-24 DIAGNOSIS — Z98.1 ARTHRODESIS STATUS: Chronic | ICD-10-CM

## 2022-08-24 PROCEDURE — 72141 MRI NECK SPINE W/O DYE: CPT | Mod: 26

## 2022-08-24 PROCEDURE — 72148 MRI LUMBAR SPINE W/O DYE: CPT | Mod: 26

## 2022-08-24 PROCEDURE — 72146 MRI CHEST SPINE W/O DYE: CPT | Mod: 26

## 2022-08-24 PROCEDURE — 72146 MRI CHEST SPINE W/O DYE: CPT

## 2022-08-24 PROCEDURE — 72148 MRI LUMBAR SPINE W/O DYE: CPT

## 2022-08-24 PROCEDURE — 72141 MRI NECK SPINE W/O DYE: CPT

## 2022-09-09 ENCOUNTER — APPOINTMENT (OUTPATIENT)
Dept: ORTHOPEDIC SURGERY | Facility: CLINIC | Age: 67
End: 2022-09-09

## 2022-09-09 PROCEDURE — 99214 OFFICE O/P EST MOD 30 MIN: CPT

## 2022-09-19 NOTE — HISTORY OF PRESENT ILLNESS
[de-identified] : Mr. Horton presents to the office s/p C3-7 posterior fusion on 12/7/17.  He is here to review his MRI results.  [Ataxia] : no ataxia [Incontinence] : no incontinence [Loss of Dexterity] : good dexterity [Urinary Ret.] : no urinary retention

## 2022-09-19 NOTE — DISCUSSION/SUMMARY
[de-identified] : We discussed further treatment options both nonsurgical and surgical.  Surgery would involve decompression in the thoracic and lumbar spine with possible fusions.  We discussed the risks and benefits.  At this time he would like to continue with conservative management.  He will try some additional physical therapy.  Follow-up in 2 to 3 months time or sooner with any changes or worsening of his symptoms.

## 2022-09-19 NOTE — PHYSICAL EXAM
[Cane] : ambulates with cane [de-identified] : Examination of the cervical spine reveals no midline or paraspinal tenderness to palpation.  Healed posterior cervical incision no cervical lymphadenopathy. Decreased range of motion with respect to flexion, extension, rotation, and lateral bending. Negative Spurlings. Negative Lhermitte's. Full range of motion bilateral shoulders without evidence of impingement. No instability of bilateral upper extremities.  Cranial nerves II through XII grossly intact. Intact sensation bilateral upper extremities.  4+ /5 deltoids biceps triceps wrist extensors wrist flexors finger flexors and hand intrinsics. 1+ biceps triceps and brachioradialis reflexes.  Positive Qureshi's. 2+ radial pulse. Negative Tinel's over the cubital and carpal tunnel. No skin lesions on the right and left upper extremities.\par \par Examination of the lumbar spine reveals no midline tenderness palpation, step-offs, or skin lesions.  Healed lumbar incision decreased range of motion with respect to flexion, extension, lateral bending, and rotation. No tenderness to palpation of the sciatic notch. No tenderness palpation of the bilateral greater trochanters. No pain with passive internal/external rotation of the hips. No instability of bilateral lower extremities.  Negative LUDIN. Negative straight leg raise bilaterally. No bowstring. Negative femoral stretch.  Grossly intact strength on the right side with 4+ out of 5 strength on the left side.  He appears to have more stiffness with movement of his left lower extremity. Grossly intact sensation to light touch bilateral lower extremities. 1+ patellar and Achilles reflexes. Downgoing Babinski. No clonus. Intact proprioception. Palpable pulses. No skin lesion and no edema on the right and left lower extremities. [de-identified] : AP lateral cervical x-rays demonstrates instrument the C3-7 posterior cervical fusion.  No gross change in alignment.\par \par Review of his cervical MRI reveals adequate decompression with instrumented fusion.  He does have chronic myelomalacia.\par \par Thoracic MRI does reveal some moderate stenosis at T10-11.  No spinal cord signal abnormality.\par \par Lumbar MRI does reveal more significant L3-S1 stenosis\par \par \par

## 2022-09-28 NOTE — PATIENT PROFILE ADULT. - PROVIDER NOTIFICATION NAME
OT [facility] name hospital Rehab    CB# [phone]  FAX# [phone]    Called if patient should continue OT    If continue OT will need a new referral order    Patient has a f/u appt 10/12/22 Dr. Romi Talley

## 2022-11-09 ENCOUNTER — APPOINTMENT (OUTPATIENT)
Dept: ORTHOPEDIC SURGERY | Facility: CLINIC | Age: 67
End: 2022-11-09

## 2022-11-16 ENCOUNTER — APPOINTMENT (OUTPATIENT)
Dept: ORTHOPEDIC SURGERY | Facility: CLINIC | Age: 67
End: 2022-11-16

## 2022-11-16 PROCEDURE — 99214 OFFICE O/P EST MOD 30 MIN: CPT

## 2022-11-16 NOTE — HISTORY OF PRESENT ILLNESS
[de-identified] : Mr. Horton presents to the office s/p C3-7 posterior fusion on 12/7/17.   He is complaining of left leg weakness and difficulty lifting it.  [Ataxia] : no ataxia [Incontinence] : no incontinence [Loss of Dexterity] : good dexterity [Urinary Ret.] : no urinary retention

## 2022-11-16 NOTE — PHYSICAL EXAM
[Cane] : ambulates with cane [de-identified] : Examination of the cervical spine reveals no midline or paraspinal tenderness to palpation.  Healed posterior cervical incision no cervical lymphadenopathy. Decreased range of motion with respect to flexion, extension, rotation, and lateral bending. Negative Spurlings. Negative Lhermitte's. Full range of motion bilateral shoulders without evidence of impingement. No instability of bilateral upper extremities.  Cranial nerves II through XII grossly intact. Intact sensation bilateral upper extremities.  4+ /5 deltoids biceps triceps wrist extensors wrist flexors finger flexors and hand intrinsics. 1+ biceps triceps and brachioradialis reflexes.  Positive Qureshi's. 2+ radial pulse. Negative Tinel's over the cubital and carpal tunnel. No skin lesions on the right and left upper extremities.\par \par Examination of the lumbar spine reveals no midline tenderness palpation, step-offs, or skin lesions.  Healed lumbar incision decreased range of motion with respect to flexion, extension, lateral bending, and rotation. No tenderness to palpation of the sciatic notch. No tenderness palpation of the bilateral greater trochanters. No pain with passive internal/external rotation of the hips. No instability of bilateral lower extremities.  Negative LUDIN. Negative straight leg raise bilaterally. No bowstring. Negative femoral stretch.  Grossly intact strength on the right side with 4+ out of 5 strength on the left side with the exception of left hip flexor which is 3 out of 5.  He appears to have more stiffness with movement of his left lower extremity but this is less stiff than prior.. Grossly intact sensation to light touch bilateral lower extremities. 1+ patellar and Achilles reflexes. Downgoing Babinski. No clonus. Intact proprioception. Palpable pulses. No skin lesion and no edema on the right and left lower extremities. [de-identified] : AP lateral cervical x-rays demonstrates instrument the C3-7 posterior cervical fusion.  No gross change in alignment.\par \par Review of his cervical MRI reveals adequate decompression with instrumented fusion.  He does have chronic myelomalacia.\par \par Thoracic MRI does reveal some moderate stenosis at T10-11.  No spinal cord signal abnormality.\par \par Lumbar MRI does reveal more significant L3-S1 stenosis\par \par \par

## 2022-11-16 NOTE — DISCUSSION/SUMMARY
[de-identified] : We discussed further treatment options.  At this point he continues to have left hip flexor weakness.  This is his main complaint.  He denies any complaints with regards to back and neck pain.  No radicular complaints.  We discussed nonsurgical and surgical options.  This point he wished to continue with conservative management.  He would like to focus more on left hip stretching and strengthening with physical therapy.  Updated prescription was given.  He will follow-up with me afterwards.

## 2022-12-13 NOTE — OCCUPATIONAL THERAPY INITIAL EVALUATION ADULT - AMBULATORY DEVICES NEEDED
----- Message from Clarice Love sent at 12/12/2022  4:15 PM CST -----  Patient is scheduled for a colonoscopy with  Dr. Luis Manuel Velasco  on 2/7    Please send Suprep prep to selected pharmacy     
Per standing/verbal of Dr. Velasco, prescription for Suprep e-scribed to patient's pharmacy.  
SAC/yes
.

## 2022-12-28 ENCOUNTER — APPOINTMENT (OUTPATIENT)
Dept: ORTHOPEDIC SURGERY | Facility: CLINIC | Age: 67
End: 2022-12-28

## 2023-01-20 NOTE — H&P PST ADULT - BP NONINVASIVE SYSTOLIC (MM HG)
Subjective   Colin Basilio is a 41 y.o. male. Presents today as a New patient here to establish care, annual, f.u chronic conditions and anxiety  Chief Complaint   Patient presents with   • Establish Care   • Hyperlipidemia   • Hypertension   • Anxiety       History of Present Illness    Previous PCP: Dr Froylan Murphy  Significant medical hx: HTN, anxiety, HLD, hyperhidrosis   Significant family hx: DM2 (mother, father, siblings); HTN (mother)    Denies Smoking, alcohol, drug use    HTN- has been out of all medications for a while now. Checks BP occasionally. Usually in 150s at home. Had been on amlodipine & metoprolol for few years     HLD- was taking lipitor 40 mg with no issues.     Diet: has been trying to eat better trying to lose weight. Walks a lot. No routine exercise.   Drinks diet sodas - 1-2 a day     Anxiety: a few months ago was eating and got something stuck in throat and scared him. Went to ED and normal findings/   Has been having anxiety attacks since. Has had anxiety for years but worse.   No triggers for Symptoms   Chest gets tight when thinks too much, throat gets dry.   No depression symptoms.   Sleep and appetite good.   No hx trauma,. No thoughts of self harm or to others   .thinks tried zoloft as a teenager. Unsure if helped.     Review of Systems   Constitutional: Negative for fatigue and fever.   Eyes: Negative for visual disturbance.   Respiratory: Negative.    Cardiovascular: Negative.    Endocrine: Negative for polydipsia, polyphagia and polyuria.   Genitourinary: Negative for difficulty urinating.   Neurological: Negative for dizziness and headaches.   Psychiatric/Behavioral: Negative for decreased concentration, self-injury, sleep disturbance and suicidal ideas. The patient is nervous/anxious.        Patient Active Problem List   Diagnosis   • Benign essential hypertension   • Gastroesophageal reflux disease   • Hypertensive disorder   • Increased glucose level   • Paresthesia  "of left upper limb   • Primary focal hyperhidrosis   • Tension-type headache     Past Medical History:   Diagnosis Date   • Anxiety    • GERD (gastroesophageal reflux disease)    • HLD (hyperlipidemia)    • HTN (hypertension)      History reviewed. No pertinent surgical history.  Family History   Problem Relation Age of Onset   • Hypertension Mother    • Diabetes Mother    • Diabetes Father    • Diabetes Sister    • Diabetes Brother      Social History     Socioeconomic History   • Marital status:    Tobacco Use   • Smoking status: Never   • Smokeless tobacco: Never   Vaping Use   • Vaping Use: Never used   Substance and Sexual Activity   • Alcohol use: Yes     Comment: rare   • Drug use: Never   • Sexual activity: Yes     Partners: Female       No Known Allergies    Current Outpatient Medications on File Prior to Visit   Medication Sig Dispense Refill   • [DISCONTINUED] amLODIPine (NORVASC) 10 MG tablet Take 10 mg by mouth Daily.     • [DISCONTINUED] atorvastatin (LIPITOR) 40 MG tablet Take 40 mg by mouth Daily.     • [DISCONTINUED] glycopyrrolate (ROBINUL) 2 MG tablet Take 2 mg by mouth 2 (Two) Times a Day.     • [DISCONTINUED] lansoprazole (PREVACID) 30 MG capsule Take 30 mg by mouth Daily.     • [DISCONTINUED] metoprolol succinate XL (TOPROL-XL) 50 MG 24 hr tablet Take 50 mg by mouth Daily.       No current facility-administered medications on file prior to visit.       Objective   Vitals:    01/20/23 0856   BP: 140/74   Pulse: 79   Resp: 18   Temp: 97.3 °F (36.3 °C)   TempSrc: Temporal   SpO2: 98%   Weight: 123 kg (271 lb)   Height: 172.7 cm (68\")   PainSc: 0-No pain     Body mass index is 41.21 kg/m².  Physical Exam  Constitutional:       Appearance: Normal appearance. He is not ill-appearing.   Cardiovascular:      Rate and Rhythm: Normal rate and regular rhythm.      Pulses: Normal pulses.      Heart sounds: Normal heart sounds, S1 normal and S2 normal. No murmur heard.  Pulmonary:      Effort: " Pulmonary effort is normal. No tachypnea, bradypnea or respiratory distress.      Breath sounds: Normal breath sounds.   Neurological:      General: No focal deficit present.      Mental Status: He is alert and oriented to person, place, and time.      Cranial Nerves: No dysarthria.      Gait: Gait is intact.   Psychiatric:         Attention and Perception: Attention normal.         Mood and Affect: Mood normal.         Speech: Speech normal.         Behavior: Behavior normal.         Thought Content: Thought content normal.         Cognition and Memory: Cognition normal.         Judgment: Judgment normal.         Assessment & Plan   Diagnoses and all orders for this visit:    1. Benign essential hypertension (Primary)  -     amLODIPine (NORVASC) 10 MG tablet; Take 1 tablet by mouth Daily.  Dispense: 90 tablet; Refill: 0  -     metoprolol succinate XL (TOPROL-XL) 50 MG 24 hr tablet; Take 1 tablet by mouth Daily.  Dispense: 90 tablet; Refill: 0  -     CBC & Differential  -     Comprehensive Metabolic Panel  -     TSH    -     Uncontrolled. Has been out of meds. Will restart and have him monitor at least once daily at home and bring to next OV in 2 weeks.   Continue Working  on a healthy diet.  Limit salt, sugar and fatty foods. Limit sodas. Add exercise to your routine.  Recommendation is 150 minutes of moderate exercise per week if you cannot do that, at least walk 30 minutes 3-4 times per week.     2. Gastroesophageal reflux disease, unspecified whether esophagitis present  -     lansoprazole (PREVACID) 30 MG capsule; Take 1 capsule by mouth Daily.  Dispense: 90 capsule; Refill: 1  -     Stable. Cont same tx    3. Primary focal hyperhidrosis  -     glycopyrrolate (ROBINUL) 2 MG tablet; Take 1 tablet by mouth 2 (Two) Times a Day.  Dispense: 90 tablet; Refill: 1  -     Stable. Cont same tx      4. Anxiety  -     busPIRone (BUSPAR) 5 MG tablet; Take 1 tablet by mouth 3 (Three) Times a Day As Needed (anxiety).   Dispense: 60 tablet; Refill: 0    tx options discussed. Would like something mainly for anxiety. Will trial buspar. Use and common SE discussed. If issues stop and call office.   If no improvement would consider SSRI.   Consider counseling if no improvement.  - if thoughts of hurting self or others call someone immediately or go to ED.       5. Hyperlipidemia, unspecified hyperlipidemia type  -     atorvastatin (LIPITOR) 40 MG tablet; Take 1 tablet by mouth Daily.  Dispense: 90 tablet; Refill: 0    Will hold off on checking lipid as has been out of meds, restart lipitor, work on diet and check levels in 3 months.     6. Increased glucose level  -     Hemoglobin A1c    7. Healthcare maintenance  -     CBC & Differential  -     Comprehensive Metabolic Panel  -     Hemoglobin A1c  -     TSH  -     Hepatitis C Antibody  Counseled on a HM. Defers flu and tdap. Consider tdap booster if injury. Eat a healthy diet and exercise routinely. Avoid smoking/alcohol and drugs. Use seatbelt 100% of time.        -Follow up: 2 weeks to check on HTN and anxiety. Call if questions  Will advise further based on labs.     - Pt seen with wife today. Pt agrees with plan of care and states no further concerns or questions today    This document is intended for medical expert use only. Reading of this document by patients and/or patient's family without participating medical staff guidance may result in misinterpretation and unintended morbidity.  Any interpretation of such data is the responsibility of the patient and/or family member responsible for the patient in concert with their primary or specialist providers, not to be left for sources of online searches such as [employer], Personally or similar queries. Relying on these approaches to knowledge may result in misinterpretation, misguided goals of care and even death should patients or family members try recommendations outside of the realm of professional medical care in a supervised way.      Please allow 3-5 business days for recommendations based on new results     Go to the ER for any possible lifethreatening symptoms such as chest pain or shortness of air.      I personally spent 22 minutes with this patient, preparing for the visit, reviewing tests, obtaining and/or reviewing a separately obtained history, performing a medically appropriate examination and/or evaluation , counseling and educating the patient/family/caregiver, ordering medications, tests, or procedures, documenting information in the medical record and independently interpreting results.            160

## 2023-03-08 NOTE — HISTORY OF PRESENT ILLNESS
[de-identified] : CC left knee\par \par HPI 62 yo male right HD presents for CSI and PT FU of gradual onset of 1.5 years of activity-related pain in the posterior medial left knee [without injury]. The pain is worse or the same, and rated a 5 out of 10, described as aching, [without radiation]. Medication makes the pain better and standing and ambulation makes the pain worse. The patient reports associated symptoms of swelling of the leg. The patient - pain at night affecting sleep, and - similar pain previously.\par \par The patient has tried the following treatments:\par Activity modification	+\par Ice/Compression  	+\par Braces    		-\par Nsaids    		+\par Physical Therapy 	+ moderate help\par Cortisone Injection	+ good help\par Visco Injection		-\par Arthroscopy		-\par \par ~50% better overall. very happy\par \par Review of Systems is positive for the above musculoskeletal symptoms and is otherwise non-contributory for general, constitutional, psychiatric, neurologic, HEENT, cardiac, respiratory, gastrointestinal, reproductive, lymphatic, and dermatologic complaints.\par \par Consult by Dr Guevara\par \par  Yes

## 2023-07-19 ENCOUNTER — APPOINTMENT (OUTPATIENT)
Dept: ORTHOPEDIC SURGERY | Facility: CLINIC | Age: 68
End: 2023-07-19
Payer: MEDICARE

## 2023-07-19 VITALS — WEIGHT: 230 LBS | HEIGHT: 71 IN | BODY MASS INDEX: 32.2 KG/M2

## 2023-07-19 PROCEDURE — 99214 OFFICE O/P EST MOD 30 MIN: CPT

## 2023-07-19 RX ORDER — METHYLPREDNISOLONE 4 MG/1
4 TABLET ORAL
Qty: 1 | Refills: 0 | Status: ACTIVE | COMMUNITY
Start: 2023-07-19 | End: 1900-01-01

## 2023-07-19 NOTE — HISTORY OF PRESENT ILLNESS
[de-identified] : Mr. Horton presents to the office s/p C3-7 posterior fusion on 12/7/17.   He is complaining of left leg weakness and difficulty lifting it for the past month.  He feels he is dragging his leg when walking.  [Ataxia] : no ataxia [Incontinence] : no incontinence [Loss of Dexterity] : good dexterity [Urinary Ret.] : no urinary retention

## 2023-07-19 NOTE — PHYSICAL EXAM
[Cane] : ambulates with cane [de-identified] : Examination of the cervical spine reveals no midline or paraspinal tenderness to palpation.  Healed posterior cervical incision no cervical lymphadenopathy. Decreased range of motion with respect to flexion, extension, rotation, and lateral bending. Negative Spurlings. Negative Lhermitte's. Full range of motion bilateral shoulders without evidence of impingement. No instability of bilateral upper extremities.  Cranial nerves II through XII grossly intact. Intact sensation bilateral upper extremities.  4+ /5 deltoids biceps triceps wrist extensors wrist flexors finger flexors and hand intrinsics. 1+ biceps triceps and brachioradialis reflexes.  Positive Qureshi's. 2+ radial pulse. Negative Tinel's over the cubital and carpal tunnel. No skin lesions on the right and left upper extremities.\par \par Examination of the lumbar spine reveals no midline tenderness palpation, step-offs, or skin lesions.  Healed lumbar incision decreased range of motion with respect to flexion, extension, lateral bending, and rotation. No tenderness to palpation of the sciatic notch. No tenderness palpation of the bilateral greater trochanters. No pain with passive internal/external rotation of the hips. No instability of bilateral lower extremities.  Negative LUDNI. Negative straight leg raise bilaterally. No bowstring. Negative femoral stretch.  Grossly intact strength on the right side with 4+ out of 5 strength on the left side with the exception of left hip flexor which is 3 out of 5.  He appears to have more stiffness with movement of his left lower extremity but this is less stiff than prior.. Grossly intact sensation to light touch bilateral lower extremities. 1+ patellar and Achilles reflexes. Downgoing Babinski. No clonus. Intact proprioception. Palpable pulses. No skin lesion and no edema on the right and left lower extremities. [de-identified] : AP lateral cervical x-rays demonstrates instrument the C3-7 posterior cervical fusion.  No gross change in alignment.\par \par Review of his cervical MRI reveals adequate decompression with instrumented fusion.  He does have chronic myelomalacia.\par \par Thoracic MRI does reveal some moderate stenosis at T10-11.  No spinal cord signal abnormality.\par \par Lumbar MRI does reveal more significant L3-S1 stenosis\par \par \par

## 2023-07-19 NOTE — DISCUSSION/SUMMARY
[de-identified] : We discussed further treatment options.  He has developed some increased left-sided leg symptoms.  He has had similar symptoms in the past which respond to nonsurgical treatment.  We discussed nonsurgical and surgical options.  He will try an oral steroid taper and will follow-up with me in 10 days time or sooner with any changes or worsening of his symptoms.

## 2023-07-26 ENCOUNTER — APPOINTMENT (OUTPATIENT)
Dept: ORTHOPEDIC SURGERY | Facility: CLINIC | Age: 68
End: 2023-07-26
Payer: MEDICARE

## 2023-07-26 VITALS — BODY MASS INDEX: 32.2 KG/M2 | WEIGHT: 230 LBS | HEIGHT: 71 IN

## 2023-07-26 DIAGNOSIS — M48.02 SPINAL STENOSIS, CERVICAL REGION: ICD-10-CM

## 2023-07-26 PROCEDURE — 99214 OFFICE O/P EST MOD 30 MIN: CPT

## 2023-07-26 NOTE — DISCUSSION/SUMMARY
[de-identified] : We discussed further treatment options.  He continues to have left-sided weakness in his leg despite the oral steroids.  No gross change in his exam.  I recommended updated MRIs of his thoracic and lumbar spine.  Follow-up afterwards.

## 2023-07-26 NOTE — HISTORY OF PRESENT ILLNESS
[de-identified] : Mr. Horton presents to the office s/p C3-7 posterior fusion on 12/7/17.   He is complaining of left leg weakness and difficulty lifting it for the past month.  He feels he is dragging his leg when walking.   He has taken steroids but his symptoms have not changed. [Ataxia] : no ataxia [Incontinence] : no incontinence [Loss of Dexterity] : good dexterity [Urinary Ret.] : no urinary retention

## 2023-08-01 ENCOUNTER — APPOINTMENT (OUTPATIENT)
Dept: ORTHOPEDIC SURGERY | Facility: CLINIC | Age: 68
End: 2023-08-01
Payer: MEDICARE

## 2023-08-01 VITALS — BODY MASS INDEX: 33.55 KG/M2 | WEIGHT: 245 LBS | HEIGHT: 71.5 IN

## 2023-08-01 PROCEDURE — 73503 X-RAY EXAM HIP UNI 4/> VIEWS: CPT

## 2023-08-01 PROCEDURE — 99204 OFFICE O/P NEW MOD 45 MIN: CPT

## 2023-08-01 NOTE — DISCUSSION/SUMMARY
[de-identified] : The patient was advised of the diagnosis.  The natural history of the pathology was explained in full to the patient in layman's terms. All questions were answered.  The risks and benefits of surgical and non-surgical treatment alternatives were explained in full to the patient.

## 2023-08-01 NOTE — HISTORY OF PRESENT ILLNESS
[10] : 10 [9] : 9 [Dull/Aching] : dull/aching [de-identified] : 8/1/23: 69yo M with right hip pain that has been worsening since February 2023 without injury. Hx fo R LAURA in 2016. Amb with cane for his back., OTC NSAIDs w/o sig relief. Started having some pain in 2020. Worse with sleeping.  [FreeTextEntry5] : chronic right hip pain worsening 2/2023 no known injury has seen another orthopedic has done PT in the past pain radiates into the glute down the leg. H/O hip replacement 2016 pain interferes while sleeping pain worse walking

## 2023-08-01 NOTE — IMAGING
[de-identified] : RIGHT HIP Well healed scar +Troch tenderness No groin tenderness +1 edema  Pain laterally with rotation Negative resisted SLR    [Right] : right hip with pelvis [Components well fixed, in good position] : Components well fixed, in good position

## 2023-08-01 NOTE — ASSESSMENT
[FreeTextEntry1] : 8/1/23: Hx of R LAURA 2016- components well fixed. Troch bursitis. Possible radiation of pain from his back. Will obtain ESR, CRP to r/o infection. MRI R hip to eval for abductor tear vs. fluid collection. Mobic rx. All rx were handwritten

## 2023-08-03 ENCOUNTER — APPOINTMENT (OUTPATIENT)
Dept: MRI IMAGING | Facility: CLINIC | Age: 68
End: 2023-08-03
Payer: MEDICARE

## 2023-08-03 PROCEDURE — 73721 MRI JNT OF LWR EXTRE W/O DYE: CPT | Mod: RT

## 2023-08-07 RX ORDER — DIAZEPAM 5 MG/1
5 TABLET ORAL
Qty: 2 | Refills: 0 | Status: ACTIVE | COMMUNITY
Start: 2023-08-07 | End: 1900-01-01

## 2023-08-14 ENCOUNTER — RESULT REVIEW (OUTPATIENT)
Age: 68
End: 2023-08-14

## 2023-08-14 ENCOUNTER — APPOINTMENT (OUTPATIENT)
Dept: MRI IMAGING | Facility: CLINIC | Age: 68
End: 2023-08-14
Payer: MEDICARE

## 2023-08-14 ENCOUNTER — OUTPATIENT (OUTPATIENT)
Dept: OUTPATIENT SERVICES | Facility: HOSPITAL | Age: 68
LOS: 1 days | End: 2023-08-14
Payer: MEDICARE

## 2023-08-14 DIAGNOSIS — Z98.1 ARTHRODESIS STATUS: Chronic | ICD-10-CM

## 2023-08-14 DIAGNOSIS — Z98.890 OTHER SPECIFIED POSTPROCEDURAL STATES: Chronic | ICD-10-CM

## 2023-08-14 DIAGNOSIS — Z98.89 OTHER SPECIFIED POSTPROCEDURAL STATES: Chronic | ICD-10-CM

## 2023-08-14 DIAGNOSIS — M48.04 SPINAL STENOSIS, THORACIC REGION: ICD-10-CM

## 2023-08-14 DIAGNOSIS — M54.16 RADICULOPATHY, LUMBAR REGION: ICD-10-CM

## 2023-08-14 PROCEDURE — 72146 MRI CHEST SPINE W/O DYE: CPT | Mod: 26

## 2023-08-14 PROCEDURE — 72146 MRI CHEST SPINE W/O DYE: CPT

## 2023-08-14 PROCEDURE — 72148 MRI LUMBAR SPINE W/O DYE: CPT | Mod: 26

## 2023-08-14 PROCEDURE — 72148 MRI LUMBAR SPINE W/O DYE: CPT

## 2023-08-18 ENCOUNTER — LABORATORY RESULT (OUTPATIENT)
Age: 68
End: 2023-08-18

## 2023-08-22 ENCOUNTER — APPOINTMENT (OUTPATIENT)
Dept: ORTHOPEDIC SURGERY | Facility: CLINIC | Age: 68
End: 2023-08-22
Payer: MEDICARE

## 2023-08-22 VITALS — BODY MASS INDEX: 33.55 KG/M2 | WEIGHT: 245 LBS | HEIGHT: 71.5 IN

## 2023-08-22 DIAGNOSIS — Z96.641 PRESENCE OF RIGHT ARTIFICIAL HIP JOINT: ICD-10-CM

## 2023-08-22 PROCEDURE — 99213 OFFICE O/P EST LOW 20 MIN: CPT

## 2023-08-22 NOTE — ASSESSMENT
[FreeTextEntry1] : Prev Doc: 8/1/23: Hx of R LAURA 2016- components well fixed. Troch bursitis. Possible radiation of pain from his back. Will obtain ESR, CRP to r/o infection. MRI R hip to eval for abductor tear vs. fluid collection. Mobic rx. All rx were handwritten  8/22  I do not think his pain is from the LAURA - his labs and MRI are all normal and most of his pain is buttocks gladis when he stands - the pain seem to be sig better with bending over and using a shopping cart -  his new MRI of L-psine also shows some sig dx -  I will have him f/u with spine surgeon

## 2023-08-22 NOTE — DISCUSSION/SUMMARY
[de-identified] : The patient was advised of the diagnosis.  The natural history of the pathology was explained in full to the patient in layman's terms. All questions were answered.  The risks and benefits of surgical and non-surgical treatment alternatives were explained in full to the patient.

## 2023-08-22 NOTE — HISTORY OF PRESENT ILLNESS
[8] : 8 [9] : 9 [Dull/Aching] : dull/aching [] : yes [de-identified] : 8/22/23  cont pain lateral and radiating from the low back to groin -  Prev Doc: 8/1/23: 67yo M with right hip pain that has been worsening since February 2023 without injury. Hx fo R LAURA in 2016. Amb with cane for his back., OTC NSAIDs w/o sig relief. Started having some pain in 2020. Worse with sleeping.

## 2023-08-22 NOTE — IMAGING
[Right] : right hip with pelvis [Components well fixed, in good position] : Components well fixed, in good position [de-identified] : RIGHT HIP Well healed scar +Troch tenderness No groin tenderness +1 edema  Pain laterally with rotation Negative resisted SLR    MRI Hip  no sig effusion  no muscle injury   MRI l Spine -  Mult level stenosis

## 2023-08-28 ENCOUNTER — APPOINTMENT (OUTPATIENT)
Dept: ORTHOPEDIC SURGERY | Facility: CLINIC | Age: 68
End: 2023-08-28
Payer: MEDICARE

## 2023-08-30 ENCOUNTER — APPOINTMENT (OUTPATIENT)
Dept: ORTHOPEDIC SURGERY | Facility: CLINIC | Age: 68
End: 2023-08-30
Payer: MEDICARE

## 2023-08-30 VITALS — HEIGHT: 71.5 IN | WEIGHT: 245 LBS | BODY MASS INDEX: 33.55 KG/M2

## 2023-08-30 DIAGNOSIS — M48.04 SPINAL STENOSIS, THORACIC REGION: ICD-10-CM

## 2023-08-30 PROCEDURE — 99214 OFFICE O/P EST MOD 30 MIN: CPT

## 2023-08-30 PROCEDURE — 99214 OFFICE O/P EST MOD 30 MIN: CPT | Mod: 25

## 2023-08-30 NOTE — PHYSICAL EXAM
[Left lower extremity below knee] : left lower extremity below knee [Left lower extremity above knee] : left lower extremity above knee [] : ambulation with cane

## 2023-08-30 NOTE — PHYSICAL EXAM
[Cane] : ambulates with cane [de-identified] : Examination of the lumbar spine reveals no midline tenderness palpation, step-offs, or skin lesions.  Healed lumbar incision decreased range of motion with respect to flexion, extension, lateral bending, and rotation. No tenderness to palpation of the sciatic notch. No tenderness palpation of the bilateral greater trochanters. No pain with passive internal/external rotation of the hips. No instability of bilateral lower extremities.  Negative LUDIN. Negative straight leg raise bilaterally. No bowstring. Negative femoral stretch.  Grossly intact strength on the right side with 4+ out of 5 strength on the left side with the exception of left hip flexor which is 3 out of 5.  He appears to have more stiffness with movement of his left lower extremity but this is less stiff than prior.. Grossly intact sensation to light touch bilateral lower extremities. 1+ patellar and Achilles reflexes. Downgoing Babinski. No clonus. Intact proprioception. Palpable pulses. No skin lesion and no edema on the right and left lower extremities. [de-identified] : AP lateral cervical x-rays demonstrates instrument the C3-7 posterior cervical fusion.  No gross change in alignment.\par  \par  Review of his cervical MRI reveals adequate decompression with instrumented fusion.  He does have chronic myelomalacia.\par  \par  Thoracic MRI does reveal some moderate stenosis at T10-11.  No spinal cord signal abnormality.\par  \par  Lumbar MRI does reveal more significant L3-S1 stenosis\par  \par  \par

## 2023-08-30 NOTE — DISCUSSION/SUMMARY
[de-identified] : reviewed the case and the imaging with him    multilvl spondylolisthesis and stenosis from L2-S1 - DISCUSSION OF tx optoins  Has done PT in the past and he could do that again he would like to try a LESi  surgery would be lami/revision L2-S1 - hes not ready for that yet

## 2023-08-30 NOTE — HISTORY OF PRESENT ILLNESS
[Gradual] : gradual [10] : 10 [Localized] : localized [Sharp] : sharp [Constant] : constant [Household chores] : household chores [Nothing helps with pain getting better] : Nothing helps with pain getting better [Standing] : standing [Walking] : walking [Stairs] : stairs [Lying in bed] : lying in bed [] : yes [de-identified] : 08/30/23 pt presents here today with lumbar spine pain  and middle back pain - NC from Dr Angel - having pain in the left leg   using a cane to ambulate since 2017   Has swelling the legs   Had surgery with Dr Guevara in 2017 - both the neck (posterior fusion C3-7)  and the back  Had MYRANDA in the past   xrays reviewed [facility] -  ap pelvis - right LAURA   MRIs reviewed: Thoracic MRI [facility] 8/14/23 does reveal Moderate to severe facet arthrosis at T10-T11 posteriorly causing mild to moderate spinal canal stenosis.  Lumbar MRI 8/14/23: L2-L3: Moderate to severe facet arthrosis with ligamentous thickening or trace anterolisthesis with mild disc bulging. Moderate spinal canal stenosis and mild foraminal narrowing. L3-L4: Moderate to severe facet arthrosis with ligamentous thickening. Mild disc bulging with small osteophyte formation. Moderate spinal canal stenosis. Mild to moderate foraminal narrowing. L4-L5: Moderate to severe facet arthrosis. Mild disc bulging is moderate osteophyte formation. Moderate lateral foraminal narrowing and moderate spinal canal stenosis. L5-S1: Mild to moderate circumferential disc bulging and endplate osteophyte formation with moderate bilateral foraminal narrowing. Decompressive laminectomy centrally with some low signal scarring adjacent to this facet joints and prominent epidural fat resulting in moderate spinal canal stenosis. [FreeTextEntry1] : Lumbar spine  [FreeTextEntry5] : no injury  [de-identified] : Dr Angel  [de-identified] : nothing

## 2023-08-30 NOTE — DISCUSSION/SUMMARY
[de-identified] : We discussed further treatment options.  We reviewed his imaging.  No obvious gross change compared to prior imaging.  We discussed the role of surgery in the form of a revision decompression.  We also discussed possible treatment of the thoracic area as well.  At this point he wishes to hold off on further surgery.  He would like to be evaluated for epidural injections.  Referral was given.  Follow-up in 3 months time or sooner with any changes or worsening of his symptoms.

## 2023-08-30 NOTE — HISTORY OF PRESENT ILLNESS
[de-identified] : Mr. Horton presents to the office s/p C3-7 posterior fusion on 12/7/17.   He is complaining of left leg weakness and difficulty lifting it for the past month.  He feels he is dragging his leg when walking.   He has taken steroids but his symptoms have not changed.  He is here to review his MRI results.  [Ataxia] : no ataxia [Incontinence] : no incontinence [Loss of Dexterity] : good dexterity [Urinary Ret.] : no urinary retention

## 2023-09-21 ENCOUNTER — APPOINTMENT (OUTPATIENT)
Dept: PAIN MANAGEMENT | Facility: CLINIC | Age: 68
End: 2023-09-21
Payer: MEDICARE

## 2023-09-21 VITALS — BODY MASS INDEX: 34.3 KG/M2 | HEIGHT: 71 IN | WEIGHT: 245 LBS

## 2023-09-21 PROCEDURE — 99214 OFFICE O/P EST MOD 30 MIN: CPT

## 2023-09-22 ENCOUNTER — APPOINTMENT (OUTPATIENT)
Dept: UROLOGY | Facility: CLINIC | Age: 68
End: 2023-09-22
Payer: MEDICARE

## 2023-09-22 VITALS
RESPIRATION RATE: 16 BRPM | DIASTOLIC BLOOD PRESSURE: 73 MMHG | SYSTOLIC BLOOD PRESSURE: 117 MMHG | TEMPERATURE: 97.2 F | HEART RATE: 82 BPM

## 2023-09-22 DIAGNOSIS — N13.8 BENIGN PROSTATIC HYPERPLASIA WITH LOWER URINARY TRACT SYMPMS: ICD-10-CM

## 2023-09-22 DIAGNOSIS — N40.1 BENIGN PROSTATIC HYPERPLASIA WITH LOWER URINARY TRACT SYMPMS: ICD-10-CM

## 2023-09-22 PROCEDURE — 99213 OFFICE O/P EST LOW 20 MIN: CPT | Mod: 25

## 2023-09-22 PROCEDURE — 51798 US URINE CAPACITY MEASURE: CPT

## 2023-09-22 RX ORDER — TAMSULOSIN HYDROCHLORIDE 0.4 MG/1
0.4 CAPSULE ORAL
Qty: 90 | Refills: 3 | Status: ACTIVE | COMMUNITY
Start: 2017-11-10 | End: 1900-01-01

## 2023-09-25 ENCOUNTER — APPOINTMENT (OUTPATIENT)
Age: 68
End: 2023-09-25

## 2023-09-25 LAB — PSA SERPL-MCNC: 0.46 NG/ML

## 2023-09-28 ENCOUNTER — APPOINTMENT (OUTPATIENT)
Dept: PAIN MANAGEMENT | Facility: CLINIC | Age: 68
End: 2023-09-28
Payer: MEDICARE

## 2023-09-28 PROCEDURE — 62323 NJX INTERLAMINAR LMBR/SAC: CPT

## 2023-10-13 ENCOUNTER — APPOINTMENT (OUTPATIENT)
Dept: PAIN MANAGEMENT | Facility: CLINIC | Age: 68
End: 2023-10-13
Payer: MEDICARE

## 2023-10-13 VITALS — BODY MASS INDEX: 34.3 KG/M2 | WEIGHT: 245 LBS | HEIGHT: 71 IN

## 2023-10-13 PROCEDURE — 99214 OFFICE O/P EST MOD 30 MIN: CPT

## 2023-10-19 ENCOUNTER — RX RENEWAL (OUTPATIENT)
Age: 68
End: 2023-10-19

## 2023-10-31 ENCOUNTER — APPOINTMENT (OUTPATIENT)
Dept: PAIN MANAGEMENT | Facility: CLINIC | Age: 68
End: 2023-10-31
Payer: MEDICARE

## 2023-10-31 PROCEDURE — 62323 NJX INTERLAMINAR LMBR/SAC: CPT

## 2023-11-17 ENCOUNTER — APPOINTMENT (OUTPATIENT)
Dept: PAIN MANAGEMENT | Facility: CLINIC | Age: 68
End: 2023-11-17
Payer: MEDICARE

## 2023-11-17 VITALS — WEIGHT: 245 LBS | BODY MASS INDEX: 34.3 KG/M2 | HEIGHT: 71 IN

## 2023-11-17 PROCEDURE — 99213 OFFICE O/P EST LOW 20 MIN: CPT

## 2023-12-04 ENCOUNTER — APPOINTMENT (OUTPATIENT)
Dept: ORTHOPEDIC SURGERY | Facility: CLINIC | Age: 68
End: 2023-12-04

## 2024-01-09 ENCOUNTER — APPOINTMENT (OUTPATIENT)
Dept: PAIN MANAGEMENT | Facility: CLINIC | Age: 69
End: 2024-01-09

## 2024-01-24 ENCOUNTER — APPOINTMENT (OUTPATIENT)
Age: 69
End: 2024-01-24
Payer: MEDICARE

## 2024-01-24 PROCEDURE — 62323 NJX INTERLAMINAR LMBR/SAC: CPT

## 2024-02-09 ENCOUNTER — APPOINTMENT (OUTPATIENT)
Dept: PAIN MANAGEMENT | Facility: CLINIC | Age: 69
End: 2024-02-09
Payer: MEDICARE

## 2024-02-09 VITALS — BODY MASS INDEX: 34.3 KG/M2 | HEIGHT: 71 IN | WEIGHT: 245 LBS

## 2024-02-09 DIAGNOSIS — M51.26 OTHER INTERVERTEBRAL DISC DISPLACEMENT, LUMBAR REGION: ICD-10-CM

## 2024-02-09 DIAGNOSIS — M48.07 SPINAL STENOSIS, LUMBOSACRAL REGION: ICD-10-CM

## 2024-02-09 DIAGNOSIS — M51.36 OTHER INTERVERTEBRAL DISC DEGENERATION, LUMBAR REGION: ICD-10-CM

## 2024-02-09 PROCEDURE — 99213 OFFICE O/P EST LOW 20 MIN: CPT

## 2024-02-09 NOTE — HISTORY OF PRESENT ILLNESS
[Mid-back] : mid-back [Lower back] : lower back [6] : 6 [5] : 5 [Tightness] : tightness [Intermittent] : intermittent [Household chores] : household chores [Injection therapy] : injection therapy [Standing] : standing [Walking] : walking [FreeTextEntry1] : 02/09/2024: s/p L3-4 LESI on 01/24/24 with >90% relief and improvement of ADLSs. Has been feeling great since injection. Still has some weakness in the legs. Has been doing PT which is also helping.   11/17/2023: s/p L3-4 LESI on 10/31/23 with >80% relief and improvement of ADLs. This was better than the first injection.   10/13/2023: s/p L4-5 LESI on 09/28/23 with 80% relief and improvement of ADLs. Has started PT.   Initial HPI 09/21/23; Pain started several years ago and is on the BILATERAL lower back with shooting pain down the left leg with difficulty lifting the left leg. Saw Dr. Romo who discussed L2-S1 lami/revision but does not want surgery at this time.   MRI Lumbar Spine 8/14/2023 independently reviewed: L2-5 moderate central stenosis; multilevel facet arthropathy  Conservative Care: has started PT  Pain Medications: tylenol/ aleve PRN  Past Injections: ESIs prior to his surgery in 2017  Spine surgery: cervical and lumbar surgery. L5-S1 laminectomy  Blood thinners: *pt takes 81mg Aspirin [] : no [de-identified] : 2017 [de-identified] : L MRI  [TWNoteComboBox1] : 80%

## 2024-02-09 NOTE — DISCUSSION/SUMMARY
[de-identified] : After discussing various treatment options with the patient including but not limited to oral medications, physical therapy, exercise modalities as well as interventional spinal injections, we have decided with the following plan:  - Continue home exercises, stretching, activity modification, physical therapy, and conservative care. - Follow-up as needed. - Will provide prescription for Physical Therapy.   *pt takes 81mg Aspirin

## 2024-02-09 NOTE — PHYSICAL EXAM
[de-identified] : Constitutional; Appears well, no apparent distress Ability to communicate: Normal  Respiratory: non-labored breathing Skin: No rash noted Head: Normocephalic, atraumatic Neck: no visible thyroid enlargement Eyes: Extraocular movements intact Neurologic: Alert and oriented x3 Psychiatric: normal mood, affect and behavior [] : non-antalgic

## 2024-06-04 ENCOUNTER — APPOINTMENT (OUTPATIENT)
Dept: PAIN MANAGEMENT | Facility: CLINIC | Age: 69
End: 2024-06-04
Payer: MEDICARE

## 2024-06-04 VITALS — HEIGHT: 61 IN | BODY MASS INDEX: 46.26 KG/M2 | WEIGHT: 245 LBS

## 2024-06-04 PROCEDURE — 99214 OFFICE O/P EST MOD 30 MIN: CPT

## 2024-06-04 NOTE — DISCUSSION/SUMMARY
[de-identified] : After discussing various treatment options with the patient including but not limited to oral medications, physical therapy, exercise modalities as well as interventional spinal injections, we have decided with the following plan:  - Continue Home exercises, stretching, activity modification, physical therapy, and conservative care. - MRI report and/or images was reviewed and discussed with the patient. - Recommend L3-4 Lumbar Epidural Steroid Injection under fluoroscopic guidance with image. (right)  - The risks, benefits and alternatives of the proposed procedure were explained in detail with the patient. The risks outlined include but are not limited to infection, bleeding, post-dural puncture headache, nerve injury, a temporary increase in pain, failure to resolve symptoms, allergic reaction, symptom recurrence, and possible elevation of blood sugar in diabetics. All questions were answered to patient's apparent satisfaction and he/she verbalized an understanding. - Patient is presenting with acute/sub-acute radicular pain with impairment in ADLs and functionality.  The pain has not responded to conservative care including NSAID therapy and/or physical therapy.  There is no bleeding tendency, unstable medical condition, or systemic infection. - Follow up in 1-2 weeks post injection for re-evaluation. *pt takes 81mg Aspirin

## 2024-06-04 NOTE — PHYSICAL EXAM
[de-identified] : Constitutional; Appears well, no apparent distress Ability to communicate: Normal  Respiratory: non-labored breathing Skin: No rash noted Head: Normocephalic, atraumatic Neck: no visible thyroid enlargement Eyes: Extraocular movements intact Neurologic: Alert and oriented x3 Psychiatric: normal mood, affect and behavior [] : ambulation with cane

## 2024-06-04 NOTE — HISTORY OF PRESENT ILLNESS
[Mid-back] : mid-back [Lower back] : lower back [6] : 6 [5] : 5 [Tightness] : tightness [Intermittent] : intermittent [Household chores] : household chores [Injection therapy] : injection therapy [Standing] : standing [Walking] : walking [TWNoteComboBox1] : 80% [Sharp] : sharp [Shooting] : shooting [FreeTextEntry1] : 06/04/2024: Pain has worsened on the b/l lower back and radiates down the right anterior thigh to the knee. Tylenol/Aleve is not helping.   02/09/2024: s/p L3-4 LESI on 01/24/24 with >90% relief and improvement of ADLSs. Has been feeling great since injection. Still has some weakness in the legs. Has been doing PT which is also helping.   11/17/2023: s/p L3-4 LESI on 10/31/23 with >80% relief and improvement of ADLs. This was better than the first injection.   10/13/2023: s/p L4-5 LESI on 09/28/23 with 80% relief and improvement of ADLs. Has started PT.   Initial HPI 09/21/23; Pain started several years ago and is on the BILATERAL lower back with shooting pain down the left leg with difficulty lifting the left leg. Saw Dr. Romo who discussed L2-S1 lami/revision but does not want surgery at this time.   MRI Lumbar Spine 8/14/2023 independently reviewed: L2-5 moderate central stenosis; multilevel facet arthropathy  Conservative Care: has started PT  Pain Medications: Tylenol/ Aleve PRN  Past Injections: ESIs prior to his surgery in 2017  Spine surgery: cervical and lumbar surgery. L5-S1 laminectomy  Blood thinners: *pt takes 81mg Aspirin [] : no [FreeTextEntry7] : RT HIP  [de-identified] : 2017 [de-identified] : L MRI

## 2024-07-01 ENCOUNTER — APPOINTMENT (OUTPATIENT)
Dept: PAIN MANAGEMENT | Facility: CLINIC | Age: 69
End: 2024-07-01
Payer: MEDICARE

## 2024-07-01 DIAGNOSIS — M54.16 RADICULOPATHY, LUMBAR REGION: ICD-10-CM

## 2024-07-01 PROCEDURE — 62323 NJX INTERLAMINAR LMBR/SAC: CPT

## 2024-07-15 ENCOUNTER — APPOINTMENT (OUTPATIENT)
Dept: PAIN MANAGEMENT | Facility: CLINIC | Age: 69
End: 2024-07-15

## 2024-10-07 ENCOUNTER — APPOINTMENT (OUTPATIENT)
Dept: ORTHOPEDIC SURGERY | Facility: CLINIC | Age: 69
End: 2024-10-07
Payer: MEDICARE

## 2024-10-07 VITALS — BODY MASS INDEX: 34.4 KG/M2 | WEIGHT: 254 LBS | HEIGHT: 72 IN

## 2024-10-07 DIAGNOSIS — M48.07 SPINAL STENOSIS, LUMBOSACRAL REGION: ICD-10-CM

## 2024-10-07 DIAGNOSIS — M54.16 RADICULOPATHY, LUMBAR REGION: ICD-10-CM

## 2024-10-07 DIAGNOSIS — M48.02 SPINAL STENOSIS, CERVICAL REGION: ICD-10-CM

## 2024-10-07 DIAGNOSIS — M54.12 RADICULOPATHY, CERVICAL REGION: ICD-10-CM

## 2024-10-07 PROCEDURE — 99214 OFFICE O/P EST MOD 30 MIN: CPT

## 2024-10-23 RX ORDER — DIAZEPAM 5 MG/1
5 TABLET ORAL
Qty: 2 | Refills: 0 | Status: ACTIVE | COMMUNITY
Start: 2024-10-23 | End: 1900-01-01

## 2024-10-24 ENCOUNTER — APPOINTMENT (OUTPATIENT)
Dept: UROLOGY | Facility: CLINIC | Age: 69
End: 2024-10-24
Payer: MEDICARE

## 2024-10-24 DIAGNOSIS — N13.8 BENIGN PROSTATIC HYPERPLASIA WITH LOWER URINARY TRACT SYMPMS: ICD-10-CM

## 2024-10-24 DIAGNOSIS — N40.1 BENIGN PROSTATIC HYPERPLASIA WITH LOWER URINARY TRACT SYMPMS: ICD-10-CM

## 2024-10-24 PROCEDURE — 99204 OFFICE O/P NEW MOD 45 MIN: CPT

## 2024-10-30 ENCOUNTER — OUTPATIENT (OUTPATIENT)
Dept: OUTPATIENT SERVICES | Facility: HOSPITAL | Age: 69
LOS: 1 days | End: 2024-10-30
Payer: MEDICARE

## 2024-10-30 ENCOUNTER — APPOINTMENT (OUTPATIENT)
Dept: MRI IMAGING | Facility: CLINIC | Age: 69
End: 2024-10-30

## 2024-10-30 DIAGNOSIS — Z98.890 OTHER SPECIFIED POSTPROCEDURAL STATES: Chronic | ICD-10-CM

## 2024-10-30 DIAGNOSIS — Z98.1 ARTHRODESIS STATUS: Chronic | ICD-10-CM

## 2024-10-30 DIAGNOSIS — M48.02 SPINAL STENOSIS, CERVICAL REGION: ICD-10-CM

## 2024-10-30 DIAGNOSIS — Z00.8 ENCOUNTER FOR OTHER GENERAL EXAMINATION: ICD-10-CM

## 2024-10-30 DIAGNOSIS — Z98.89 OTHER SPECIFIED POSTPROCEDURAL STATES: Chronic | ICD-10-CM

## 2024-10-30 DIAGNOSIS — M54.12 RADICULOPATHY, CERVICAL REGION: ICD-10-CM

## 2024-10-30 PROCEDURE — 72148 MRI LUMBAR SPINE W/O DYE: CPT

## 2024-10-30 PROCEDURE — 72148 MRI LUMBAR SPINE W/O DYE: CPT | Mod: 26

## 2024-11-04 ENCOUNTER — RX RENEWAL (OUTPATIENT)
Age: 69
End: 2024-11-04

## 2024-11-06 ENCOUNTER — APPOINTMENT (OUTPATIENT)
Dept: MRI IMAGING | Facility: CLINIC | Age: 69
End: 2024-11-06

## 2024-11-06 ENCOUNTER — OUTPATIENT (OUTPATIENT)
Dept: OUTPATIENT SERVICES | Facility: HOSPITAL | Age: 69
LOS: 1 days | End: 2024-11-06
Payer: MEDICARE

## 2024-11-06 DIAGNOSIS — M48.02 SPINAL STENOSIS, CERVICAL REGION: ICD-10-CM

## 2024-11-06 DIAGNOSIS — Z98.1 ARTHRODESIS STATUS: Chronic | ICD-10-CM

## 2024-11-06 DIAGNOSIS — Z98.89 OTHER SPECIFIED POSTPROCEDURAL STATES: Chronic | ICD-10-CM

## 2024-11-06 DIAGNOSIS — Z00.8 ENCOUNTER FOR OTHER GENERAL EXAMINATION: ICD-10-CM

## 2024-11-06 DIAGNOSIS — Z98.890 OTHER SPECIFIED POSTPROCEDURAL STATES: Chronic | ICD-10-CM

## 2024-11-06 DIAGNOSIS — M54.12 RADICULOPATHY, CERVICAL REGION: ICD-10-CM

## 2024-11-06 PROCEDURE — 72141 MRI NECK SPINE W/O DYE: CPT | Mod: 26

## 2024-11-06 PROCEDURE — 72141 MRI NECK SPINE W/O DYE: CPT

## 2024-11-27 ENCOUNTER — APPOINTMENT (OUTPATIENT)
Dept: ORTHOPEDIC SURGERY | Facility: CLINIC | Age: 69
End: 2024-11-27
Payer: MEDICARE

## 2024-11-27 VITALS — HEIGHT: 71.5 IN | WEIGHT: 250 LBS | BODY MASS INDEX: 34.23 KG/M2

## 2024-11-27 DIAGNOSIS — M48.07 SPINAL STENOSIS, LUMBOSACRAL REGION: ICD-10-CM

## 2024-11-27 DIAGNOSIS — M48.02 SPINAL STENOSIS, CERVICAL REGION: ICD-10-CM

## 2024-11-27 PROCEDURE — 99214 OFFICE O/P EST MOD 30 MIN: CPT

## 2025-01-17 ENCOUNTER — APPOINTMENT (OUTPATIENT)
Dept: ORTHOPEDIC SURGERY | Facility: CLINIC | Age: 70
End: 2025-01-17
Payer: MEDICARE

## 2025-01-17 VITALS — HEIGHT: 71 IN | WEIGHT: 250 LBS | BODY MASS INDEX: 35 KG/M2

## 2025-01-17 DIAGNOSIS — M51.369: ICD-10-CM

## 2025-01-17 DIAGNOSIS — M51.26 OTHER INTERVERTEBRAL DISC DISPLACEMENT, LUMBAR REGION: ICD-10-CM

## 2025-01-17 DIAGNOSIS — M48.062 SPINAL STENOSIS, LUMBAR REGION WITH NEUROGENIC CLAUDICATION: ICD-10-CM

## 2025-01-17 DIAGNOSIS — M54.16 RADICULOPATHY, LUMBAR REGION: ICD-10-CM

## 2025-01-17 DIAGNOSIS — M43.16 SPONDYLOLISTHESIS, LUMBAR REGION: ICD-10-CM

## 2025-01-17 PROCEDURE — 99215 OFFICE O/P EST HI 40 MIN: CPT

## 2025-01-17 PROCEDURE — 72170 X-RAY EXAM OF PELVIS: CPT

## 2025-01-17 PROCEDURE — 72110 X-RAY EXAM L-2 SPINE 4/>VWS: CPT

## 2025-07-18 NOTE — DISCHARGE NOTE ADULT - CARE PLAN
Pt given menu to order dinner.      Shy Ackerman RN  07/18/25 [id number]     Principal Discharge DX:	Spinal stenosis in cervical region  Goal:	pain control, surgical site healing, ambulation, return to ADL's  Instructions for follow-up, activity and diet:	Patient is now s/p C3-C7 laminectomy and fusion on 12/7/17. Patient tolerated the procedure well without any intraoperative complications. Patient tolerated physical therapy well and pain is controlled. Seen by medical attending for continuity of care and management and cleared for safe discharge. As per surgeon patient stable and ready for discharge.     Please follow up with  on 1-2 weeks, please call [phone] for appointment time and date. Please follow up with your PMD for continuity of care and management as medications may have changed. Do not take any NSAIDS (motrin, advil, ibuprofren, aleve), Aspirin, Antiinflammatory medications unless instructed by your orthopaedic surgeon to continue. Avoid any heavy lifting, bending, squatting, twisting motion. Keep dressing/incision clean, dry, intact. Any suture/staples to be removed on post op day # 14 at office visit. Weight bear as tolerated. Principal Discharge DX:	Spinal stenosis in cervical region  Goal:	pain control, surgical site healing, ambulation, return to ADL's  Instructions for follow-up, activity and diet:	Patient is now s/p C3-C7 laminectomy and fusion on 12/7/17. Patient tolerated the procedure well without any intraoperative complications. Patient tolerated physical therapy well and pain is controlled. Seen by medical attending for continuity of care and management and cleared for safe discharge. As per surgeon patient stable and ready for discharge.     Please follow up with  on Monday 12/18 at 1:30pm. Please follow up with your PMD for continuity of care and management as medications may have changed. Do not take any NSAIDS (motrin, advil, ibuprofren, aleve), Aspirin, Antiinflammatory medications unless instructed by your orthopaedic surgeon to continue. Avoid any heavy lifting, bending, squatting, twisting motion. Keep dressing/incision clean, dry, intact. Any suture/staples to be removed on post op day # 14 at office visit. Weight bear as tolerated. Principal Discharge DX:	Spinal stenosis in cervical region  Goal:	pain control, surgical site healing, ambulation, return to ADL's  Instructions for follow-up, activity and diet:	Patient is now s/p C3-C7 laminectomy and fusion on 12/7/17. Patient tolerated the procedure well without any intraoperative complications. Patient tolerated physical therapy well and pain is controlled. Seen by medical attending for continuity of care and management and cleared for safe discharge. As per surgeon patient stable and ready for discharge.     Please follow up with  on Monday 12/18 at 1:30pm. Please follow up with PMD within 2 weeks as medications were adjusted during hospital stay.  In addition, please follow up within 1 week at Dr Moran (cardiology) office for an outpatient echocardiogram, call to make an appointment.  Do not take any NSAIDS (motrin, advil, ibuprofren, aleve), or antiinflammatory medications unless instructed by your orthopaedic surgeon to continue.  Patient may however resume ASA 81 mg daily starting tomorrow, 12/12 as per Dr Guevara for cardiac health.  Avoid any heavy lifting, bending, squatting, twisting motion. Keep dressing/incision clean, dry, intact. Any suture/staples to be removed on post op day # 14 at office visit. Weight bear as tolerated.